# Patient Record
Sex: MALE | Race: WHITE | NOT HISPANIC OR LATINO | Employment: OTHER | URBAN - METROPOLITAN AREA
[De-identification: names, ages, dates, MRNs, and addresses within clinical notes are randomized per-mention and may not be internally consistent; named-entity substitution may affect disease eponyms.]

---

## 2017-01-03 ENCOUNTER — CLINICAL SUPPORT (OUTPATIENT)
Dept: AUDIOLOGY | Facility: CLINIC | Age: 82
End: 2017-01-03

## 2017-01-03 DIAGNOSIS — H90.3 SENSORINEURAL HEARING LOSS, BILATERAL: Primary | ICD-10-CM

## 2017-01-03 PROCEDURE — 99499 UNLISTED E&M SERVICE: CPT | Mod: S$GLB,,, | Performed by: OTOLARYNGOLOGY

## 2017-01-03 NOTE — PROGRESS NOTES
Patient's options were discussed.  He wants to review the prices with other places and will call to schedule further appointments if he decides to purchase hearing aids here.

## 2017-01-17 RX ORDER — KETOCONAZOLE 20 MG/ML
SHAMPOO, SUSPENSION TOPICAL
Qty: 120 ML | Refills: 3 | Status: SHIPPED | OUTPATIENT
Start: 2017-01-19 | End: 2018-07-19 | Stop reason: SDDI

## 2017-01-17 NOTE — TELEPHONE ENCOUNTER
----- Message from Keyla Ho sent at 1/17/2017 10:57 AM CST -----  Contact: Patient  Refill    ketoconazole (NIZORAL) 2 % shampoo   Sig - Route: Apply topically twice a week. - Topical (Top)    90 day supply     [facility] - [facility] [address] [facility] [address] [phone] (Phone) [phone] (Fax)    You can reach the patient at [phone].    Thanks!

## 2017-03-13 ENCOUNTER — PATIENT MESSAGE (OUTPATIENT)
Dept: INTERNAL MEDICINE | Facility: CLINIC | Age: 82
End: 2017-03-13

## 2017-03-14 RX ORDER — TADALAFIL 10 MG/1
10 TABLET ORAL DAILY PRN
Qty: 10 TABLET | Refills: 11 | Status: SHIPPED | OUTPATIENT
Start: 2017-03-14 | End: 2021-06-01

## 2017-03-30 ENCOUNTER — PATIENT MESSAGE (OUTPATIENT)
Dept: CARDIOLOGY | Facility: CLINIC | Age: 82
End: 2017-03-30

## 2017-07-21 RX ORDER — FINASTERIDE 5 MG/1
TABLET, FILM COATED ORAL
Qty: 90 TABLET | Refills: 3 | Status: SHIPPED | OUTPATIENT
Start: 2017-07-21 | End: 2018-07-19 | Stop reason: SDDI

## 2018-07-19 ENCOUNTER — OFFICE VISIT (OUTPATIENT)
Dept: CARDIOLOGY | Facility: CLINIC | Age: 83
End: 2018-07-19
Payer: MEDICARE

## 2018-07-19 VITALS
BODY MASS INDEX: 25.41 KG/M2 | SYSTOLIC BLOOD PRESSURE: 177 MMHG | HEIGHT: 70 IN | WEIGHT: 177.5 LBS | HEART RATE: 68 BPM | DIASTOLIC BLOOD PRESSURE: 83 MMHG

## 2018-07-19 DIAGNOSIS — I45.4 IVCD (INTRAVENTRICULAR CONDUCTION DEFECT): ICD-10-CM

## 2018-07-19 DIAGNOSIS — I38 VALVULAR HEART DISEASE: ICD-10-CM

## 2018-07-19 DIAGNOSIS — Z95.2 S/P AORTIC VALVE REPLACEMENT: ICD-10-CM

## 2018-07-19 DIAGNOSIS — T82.897D AORTIC PROSTHETIC VALVE REGURGITATION, SUBSEQUENT ENCOUNTER: ICD-10-CM

## 2018-07-19 DIAGNOSIS — I44.0 1ST DEGREE AV BLOCK: ICD-10-CM

## 2018-07-19 DIAGNOSIS — I25.810 CORONARY ARTERY DISEASE INVOLVING CORONARY BYPASS GRAFT OF NATIVE HEART WITHOUT ANGINA PECTORIS: Primary | ICD-10-CM

## 2018-07-19 DIAGNOSIS — R42 EPISODIC LIGHTHEADEDNESS: ICD-10-CM

## 2018-07-19 PROBLEM — T82.897A AORTIC PROSTHETIC VALVE REGURGITATION: Status: ACTIVE | Noted: 2018-07-19

## 2018-07-19 PROCEDURE — 99214 OFFICE O/P EST MOD 30 MIN: CPT | Mod: S$GLB,,, | Performed by: INTERNAL MEDICINE

## 2018-07-19 PROCEDURE — 99999 PR PBB SHADOW E&M-EST. PATIENT-LVL III: CPT | Mod: PBBFAC,,, | Performed by: INTERNAL MEDICINE

## 2018-07-19 RX ORDER — LOSARTAN POTASSIUM AND HYDROCHLOROTHIAZIDE 12.5; 5 MG/1; MG/1
1 TABLET ORAL DAILY
COMMUNITY
Start: 2018-07-17 | End: 2019-07-01 | Stop reason: SDUPTHER

## 2018-07-19 RX ORDER — ATORVASTATIN CALCIUM 40 MG/1
40 TABLET, FILM COATED ORAL DAILY
Qty: 90 TABLET | Refills: 3 | Status: SHIPPED | OUTPATIENT
Start: 2018-07-19 | End: 2019-07-01 | Stop reason: ALTCHOICE

## 2018-07-19 NOTE — PROGRESS NOTES
Subjective:   Patient ID:  Chang Braswell is a 83 y.o. male who presents for evaluation of Pre Syncope and Coronary artery disease involving coronary bypass graft with      HPI: Very pleasant man previously seen by Dr. Lewis with Dr. Jolene Lantigua in 2016 who presents with episodes of what he describes as dizziness (not room-spinning but with a feeling of unsteadiness and lightheadness).  He has had two     He had a CABG and AVR (bioprosthesis) in 2007 and on his 2016 echo there was evidence of mild to moderate AI.  It was not stated whether the insufficiency was central or paravalvular.    Past Medical History:   Diagnosis Date    Coronary artery disease     Heart murmur     Hyperlipidemia     Hypertension     Valvular regurgitation        Past Surgical History:   Procedure Laterality Date    CARDIAC VALVE SURGERY  2007    CORONARY ARTERY BYPASS GRAFT  2007    x 2       Social History   Substance Use Topics    Smoking status: Former Smoker     Quit date: 1/1/1976    Smokeless tobacco: Never Used    Alcohol use 8.4 oz/week     7 Glasses of wine, 7 Cans of beer per week      Comment: socially       Family History   Problem Relation Age of Onset    Heart attack Neg Hx     Heart disease Neg Hx     Hyperlipidemia Neg Hx     Hypertension Neg Hx        Current Outpatient Prescriptions   Medication Sig    co-enzyme Q-10 30 mg capsule Take 3 capsules (90 mg total) by mouth 2 (two) times daily.    losartan-hydrochlorothiazide 50-12.5 mg (HYZAAR) 50-12.5 mg per tablet Take 1 tablet by mouth once daily.     tadalafil (CIALIS) 10 MG tablet Take 1 tablet (10 mg total) by mouth daily as needed for Erectile Dysfunction.     No current facility-administered medications for this visit.        Review of patient's allergies indicates:  No Known Allergies    Review of Systems   Constitution: Negative.   HENT: Negative.    Eyes: Negative.    Cardiovascular: Negative.  Negative for chest pain, dyspnea on exertion,  near-syncope, orthopnea and palpitations.   Respiratory: Negative.  Negative for cough, hemoptysis and shortness of breath.    Endocrine: Negative.    Hematologic/Lymphatic: Negative.    Skin: Negative.    Musculoskeletal: Negative.    Gastrointestinal: Negative.    Genitourinary: Negative.    Neurological: Negative.    Psychiatric/Behavioral: Negative.      Objective:   Physical Exam   Constitutional: He is oriented to person, place, and time. He appears well-developed and well-nourished.   HENT:   Head: Normocephalic and atraumatic.   Mouth/Throat: Oropharynx is clear and moist.   Eyes: Conjunctivae and EOM are normal. No scleral icterus.   Neck: Normal range of motion. Neck supple. No JVD present.   Cardiovascular: Normal rate, regular rhythm, normal heart sounds and intact distal pulses.  Exam reveals no gallop and no friction rub.    No murmur heard.  Pulmonary/Chest: Effort normal and breath sounds normal. He has no wheezes. He has no rales.   Abdominal: Soft. Bowel sounds are normal. He exhibits no distension. There is no tenderness.   Musculoskeletal: Normal range of motion. He exhibits no edema.   Neurological: He is alert and oriented to person, place, and time.   Skin: Skin is warm and dry. No rash noted. No erythema.   Psychiatric: He has a normal mood and affect. His behavior is normal. Judgment and thought content normal.   Vitals reviewed.      Lab Results   Component Value Date    WBC 4.65 06/15/2016    HGB 14.9 06/15/2016    HCT 44.2 06/15/2016    MCV 78 (L) 06/15/2016     06/15/2016         Chemistry        Component Value Date/Time     (L) 06/15/2016 0935    K 4.9 06/15/2016 0935    CL 99 06/15/2016 0935    CO2 27 06/15/2016 0935    BUN 12 06/15/2016 0935    CREATININE 0.8 06/15/2016 0935    GLU 85 06/15/2016 0935        Component Value Date/Time    CALCIUM 9.5 06/15/2016 0935    ALKPHOS 74 06/15/2016 0935    AST 20 06/15/2016 0935    ALT 14 06/15/2016 0935    BILITOT 0.5 06/15/2016  0935    ESTGFRAFRICA >60.0 06/15/2016 0935    EGFRNONAA >60.0 06/15/2016 0935            Lab Results   Component Value Date    CHOL 223 (H) 06/15/2016     Lab Results   Component Value Date    HDL 61 06/15/2016     Lab Results   Component Value Date    LDLCALC 134.8 06/15/2016     Lab Results   Component Value Date    TRIG 136 06/15/2016     Lab Results   Component Value Date    CHOLHDL 27.4 06/15/2016       No results found for: TSH    No results found for: HGBA1C      Assessment:     1. 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 Valvular heart disease    3. S/P aortic valve replacement (bioprosthetic)    4. Aortic prosthetic valve regurgitation, subsequent encounter        Plan:     30 day event monitor     Echocardiogram    Watch BPs at home and double Hyzaar to 100/25 if it persists in elevation    He's taking ASA every day but not a statin.  Start atorvastatin 40.    F/U 3 months

## 2018-07-19 NOTE — LETTER
July 19, 2018      Gabino Manley MD  [address]  [facility] [address]           [facility]  [address]  [facility] [address]  Phone: [phone]          Patient: Chang Braswell   MR Number: [id number]   YOB: 1935   Date of Visit: 7/19/2018       Dear Dr. Gabino Manley:    Thank you for referring Chang Braswell to me for evaluation. Attached you will find relevant portions of my assessment and plan of care.    If you have questions, please do not hesitate to call me. I look forward to following Chang Braswell along with you.    Sincerely,    Bernardo Tobin MD    Enclosure  CC:  No Recipients    If you would like to receive this communication electronically, please contact [email or web address] or [phone] to request more information on [employer] Link access.    For providers and/or their staff who would like to refer a patient to [facility], please contact us through our one-stop-shop provider referral line, [facility], at [phone].    If you feel you have received this communication in error or would no longer like to receive these types of communications, please e-mail [email or web address]

## 2018-07-24 ENCOUNTER — HOSPITAL ENCOUNTER (OUTPATIENT)
Dept: CARDIOLOGY | Facility: CLINIC | Age: 83
Discharge: HOME | End: 2018-07-24
Attending: INTERNAL MEDICINE
Payer: MEDICARE

## 2018-07-24 ENCOUNTER — CLINICAL SUPPORT (OUTPATIENT)
Dept: ELECTROPHYSIOLOGY | Facility: CLINIC | Age: 83
End: 2018-07-24
Attending: INTERNAL MEDICINE
Payer: MEDICARE

## 2018-07-24 DIAGNOSIS — T82.897D AORTIC PROSTHETIC VALVE REGURGITATION, SUBSEQUENT ENCOUNTER: ICD-10-CM

## 2018-07-24 DIAGNOSIS — I25.810 CORONARY ARTERY DISEASE INVOLVING CORONARY BYPASS GRAFT OF NATIVE HEART WITHOUT ANGINA PECTORIS: ICD-10-CM

## 2018-07-24 DIAGNOSIS — R42 EPISODIC LIGHTHEADEDNESS: ICD-10-CM

## 2018-07-24 DIAGNOSIS — I44.0 1ST DEGREE AV BLOCK: ICD-10-CM

## 2018-07-24 DIAGNOSIS — Z95.2 S/P AORTIC VALVE REPLACEMENT: ICD-10-CM

## 2018-07-24 DIAGNOSIS — I45.4 IVCD (INTRAVENTRICULAR CONDUCTION DEFECT): ICD-10-CM

## 2018-07-24 LAB
AORTIC VALVE REGURGITATION: ABNORMAL
ESTIMATED PA SYSTOLIC PRESSURE: 24.53
MITRAL VALVE MOBILITY: NORMAL
MITRAL VALVE REGURGITATION: ABNORMAL
RETIRED EF AND QEF - SEE NOTES: 50 (ref 55–65)
TRICUSPID VALVE REGURGITATION: ABNORMAL

## 2018-07-24 PROCEDURE — 93306 TTE W/DOPPLER COMPLETE: CPT | Mod: S$GLB,,, | Performed by: INTERNAL MEDICINE

## 2018-07-24 PROCEDURE — 93268 ECG RECORD/REVIEW: CPT | Mod: S$GLB,,, | Performed by: INTERNAL MEDICINE

## 2018-09-10 ENCOUNTER — TELEPHONE (OUTPATIENT)
Dept: NEUROLOGY | Facility: CLINIC | Age: 83
End: 2018-09-10

## 2018-09-10 NOTE — TELEPHONE ENCOUNTER
Spoke to wife and appointment has been scheduled for 10-22-18 at 0840 am as a referral from Dr.Robert Manley for memory.

## 2018-09-10 NOTE — TELEPHONE ENCOUNTER
----- Message from Sharron Martinez sent at 9/10/2018 10:43 AM CDT -----  Contact: Chang   Name of Who is Calling: Chang       What is the request in detail: Patient is requesting a call back concerning scheduling a appointment  he wants someone to call his wife Dr.Rebecca King     Can the clinic reply by [facility]: no      What Number to Call Back [facility] not [facility]: [phone]

## 2018-10-22 ENCOUNTER — OFFICE VISIT (OUTPATIENT)
Dept: NEUROLOGY | Facility: CLINIC | Age: 83
End: 2018-10-22
Payer: MEDICARE

## 2018-10-22 ENCOUNTER — LAB VISIT (OUTPATIENT)
Dept: LAB | Facility: OTHER | Age: 83
End: 2018-10-22
Attending: PSYCHIATRY & NEUROLOGY
Payer: MEDICARE

## 2018-10-22 VITALS
HEART RATE: 57 BPM | WEIGHT: 182.56 LBS | HEIGHT: 70 IN | SYSTOLIC BLOOD PRESSURE: 183 MMHG | DIASTOLIC BLOOD PRESSURE: 65 MMHG | BODY MASS INDEX: 26.14 KG/M2

## 2018-10-22 DIAGNOSIS — I10 ESSENTIAL HYPERTENSION: ICD-10-CM

## 2018-10-22 DIAGNOSIS — Z95.2 S/P AORTIC VALVE REPLACEMENT: ICD-10-CM

## 2018-10-22 DIAGNOSIS — R41.9 UNSPECIFIED SYMPTOMS AND SIGNS INVOLVING COGNITIVE FUNCTIONS AND AWARENESS: Primary | ICD-10-CM

## 2018-10-22 DIAGNOSIS — H91.90 DECREASED HEARING, UNSPECIFIED LATERALITY: ICD-10-CM

## 2018-10-22 DIAGNOSIS — I25.810 CORONARY ARTERY DISEASE INVOLVING CORONARY BYPASS GRAFT OF NATIVE HEART WITHOUT ANGINA PECTORIS: ICD-10-CM

## 2018-10-22 DIAGNOSIS — R41.9 UNSPECIFIED SYMPTOMS AND SIGNS INVOLVING COGNITIVE FUNCTIONS AND AWARENESS: ICD-10-CM

## 2018-10-22 PROBLEM — R41.89 OTHER SYMPTOMS AND SIGNS INVOLVING COGNITIVE FUNCTIONS AND AWARENESS: Status: ACTIVE | Noted: 2018-10-22

## 2018-10-22 LAB
FOLATE SERPL-MCNC: 12.6 NG/ML
T3 SERPL-MCNC: 98 NG/DL
TSH SERPL DL<=0.005 MIU/L-ACNC: 1.41 UIU/ML
VIT B12 SERPL-MCNC: 461 PG/ML

## 2018-10-22 PROCEDURE — 99214 OFFICE O/P EST MOD 30 MIN: CPT | Mod: PBBFAC | Performed by: PSYCHIATRY & NEUROLOGY

## 2018-10-22 PROCEDURE — 36415 COLL VENOUS BLD VENIPUNCTURE: CPT

## 2018-10-22 PROCEDURE — 82746 ASSAY OF FOLIC ACID SERUM: CPT

## 2018-10-22 PROCEDURE — 84443 ASSAY THYROID STIM HORMONE: CPT

## 2018-10-22 PROCEDURE — 84480 ASSAY TRIIODOTHYRONINE (T3): CPT

## 2018-10-22 PROCEDURE — 3288F FALL RISK ASSESSMENT DOCD: CPT | Mod: CPTII,,, | Performed by: PSYCHIATRY & NEUROLOGY

## 2018-10-22 PROCEDURE — 82607 VITAMIN B-12: CPT

## 2018-10-22 PROCEDURE — 99204 OFFICE O/P NEW MOD 45 MIN: CPT | Mod: S$PBB,,, | Performed by: PSYCHIATRY & NEUROLOGY

## 2018-10-22 PROCEDURE — 1100F PTFALLS ASSESS-DOCD GE2>/YR: CPT | Mod: CPTII,,, | Performed by: PSYCHIATRY & NEUROLOGY

## 2018-10-22 PROCEDURE — 99999 PR PBB SHADOW E&M-EST. PATIENT-LVL IV: CPT | Mod: PBBFAC,,, | Performed by: PSYCHIATRY & NEUROLOGY

## 2018-10-22 NOTE — LETTER
October 22, 2018      Gabino Manley MD  [address] [facility] [address]  [place] [id number]           [facility]  [address]  [facility] [address]  Phone: [phone]  Fax: [phone]          Patient: Chang Braswell   MR Number: [id number]   YOB: 1935   Date of Visit: 10/22/2018       Dear Dr. Gabino Manley:    Thank you for referring Chang Braswell to me for evaluation. Attached you will find relevant portions of my assessment and plan of care.    If you have questions, please do not hesitate to call me. I look forward to following Chang Braswell along with you.    Sincerely,    Zev Estrada MD    Enclosure  CC:  No Recipients    If you would like to receive this communication electronically, please contact [email or web address] or [phone] to request more information on [employer] Link access.    For providers and/or their staff who would like to refer a patient to [facility], please contact us through our one-stop-shop provider referral line, [facility], at [phone].    If you feel you have received this communication in error or would no longer like to receive these types of communications, please e-mail [email or web address]

## 2018-10-22 NOTE — PATIENT INSTRUCTIONS
Discussed with patient and spouse.  Will get a CT scan the brain, noncontrast, B12/folate/T3/TSH and schedule neuropsychological testing. Patient will follow-up after neuropsychological testing is completed.

## 2018-10-22 NOTE — PROGRESS NOTES
Subjective:       Patient ID: Chang Braswell is a 83 y.o. male.    Chief Complaint:  Memory Loss      History of Present Illness  HPI   This is an 83-year-old male was referred for evaluation intermittent memory difficulties.  His spouse, who is a family practice physician, was the primary historian as patient tended to minimize his problems.  She reports that over the year or two she has noted that he has slowed down cognitively in that he is more cautious when driving and tends to drive slowly.  In addition, he has been using getting confused using the [employer] wipers or the turn signals.  She does note that he is hearing impaired and will be evaluated for this in the near future.  Other problems include excessive snoring and she does note that he occasionally has brief apneic episodes and she has to try to wake him.  He does have daytime drowsiness.  The patient is otherwise able to take care of his day-to-day needs at home including managing his medications and finances but she does report that in the past he has stopped taking his blood pressure medications and then had near syncopal like episodes and had to restart his medications.  He has no history of blackouts or seizures and no history of any significant head trauma.  Medical records including labs from his primary care physician, Dr. Manley, are available for review.       Review of Systems  Review of Systems   Constitutional: Negative.    HENT: Positive for hearing loss.    Eyes: Negative.  Negative for visual disturbance.   Respiratory: Negative.  Negative for shortness of breath.    Cardiovascular: Negative.  Negative for chest pain and palpitations.   Gastrointestinal: Negative.    Endocrine: Negative.    Genitourinary: Negative.    Musculoskeletal: Negative.  Negative for back pain and gait problem.   Skin: Negative.    Allergic/Immunologic: Negative.    Neurological: Negative.  Negative for dizziness, tremors, seizures, syncope, speech  difficulty, weakness, numbness and headaches.   Hematological: Negative.    Psychiatric/Behavioral: Positive for decreased concentration and sleep disturbance (Excessive snoring).       Objective:      Neurologic Exam     Mental Status   Oriented to person, place, and time.   Registration: recalls 3 of 3 objects. Follows 3 step commands.   Attention: normal. Concentration: normal.   Speech: speech is normal   Level of consciousness: alert  Knowledge: good.   Able to name object. Able to read. Able to repeat. Able to write. Normal comprehension.   Patient is alert, verbal incoherent, and no distress. Language functions are normal. Attention span and concentration is normal.  Judgment and insight is normal. Affect is appropriate. Mood is even.     Cranial Nerves   Cranial nerves II through XII intact.     CN II   Visual fields full to confrontation.     CN III, IV, VI   Pupils are equal, round, and reactive to light.  Extraocular motions are normal.   Right pupil: Size: 3 mm. Shape: regular. Reactivity: brisk. Consensual response: intact. Accommodation: intact.   Left pupil: Size: 3 mm. Shape: regular. Reactivity: brisk. Consensual response: intact. Accommodation: intact.   CN III: no CN III palsy  CN VI: no CN VI palsy  Nystagmus: none   Diplopia: none  Ophthalmoparesis: none    CN V   Facial sensation intact.     CN VII   Facial expression full, symmetric.     CN VIII   CN VIII normal.     CN IX, X   CN IX normal.     CN XI   CN XI normal.     CN XII   CN XII normal.   Fundus examination:  Sharp disc margins.  Normal vessels on nondilated exam.     Motor Exam   Muscle bulk: normal  Overall muscle tone: normal    Strength   Strength 5/5 throughout. Examination of extremities revealed normal tone and power in both upper and lower extremities with no focal weakness, involuntary movements or atrophy.     Sensory Exam   Light touch normal.   Proprioception normal.   Pinprick normal.     Gait, Coordination, and Reflexes      Gait  Gait: normal    Coordination   Romberg: negative  Finger to nose coordination: normal    Tremor   Resting tremor: absent  Intention tremor: absent  Action tremor: absent    Reflexes   Right brachioradialis: 1+  Left brachioradialis: 1+  Right biceps: 1+  Left biceps: 1+  Right triceps: 1+  Left triceps: 1+  Right patellar: 1+  Left patellar: 1+  Right achilles: 1+  Left achilles: 1+  Right plantar: normal  Left plantar: normal      Physical Exam   Constitutional: He is oriented to person, place, and time. He appears well-developed and well-nourished.   HENT:   Head: Normocephalic and atraumatic.   Eyes: EOM are normal. Pupils are equal, round, and reactive to light.   Fundus examination showed sharp discs margins   Neck: Normal range of motion. Neck supple. Carotid bruit is not present.   Cardiovascular: Normal rate, regular rhythm and normal heart sounds.   Aortic bruit projected to the carotids bilaterally   Neurological: He is oriented to person, place, and time. He has normal strength. He has a normal Finger-Nose-Finger Test and a normal Romberg Test. Gait normal.   Reflex Scores:       Tricep reflexes are 1+ on the right side and 1+ on the left side.       Bicep reflexes are 1+ on the right side and 1+ on the left side.       Brachioradialis reflexes are 1+ on the right side and 1+ on the left side.       Patellar reflexes are 1+ on the right side and 1+ on the left side.       Achilles reflexes are 1+ on the right side and 1+ on the left side.  Psychiatric: His speech is normal.   Vitals reviewed.        Assessment:        1. Unspecified symptoms and signs involving cognitive functions and awareness     2. Coronary artery disease involving coronary bypass graft of native heart without angina pectoris    3. Decreased hearing, unspecified laterality    4. S/P aortic valve replacement (bioprosthetic)            Plan:       Discussed with patient and spouse.  Will get a CT scan the brain, noncontrast,  B12/folate/T3/TSH and schedule neuropsychological testing. Patient will follow-up after neuropsychological testing is completed.  Advised spouse regarding his excessive snoring and the possibility of sleep apnea.  She is advised to discuss this with Dr. Manley, to consider doing sleep study in the future.  The patient will follow-up after neuropsychological testing results are available.

## 2018-10-25 ENCOUNTER — HOSPITAL ENCOUNTER (OUTPATIENT)
Dept: RADIOLOGY | Facility: HOSPITAL | Age: 83
Discharge: HOME | End: 2018-10-25
Attending: PSYCHIATRY & NEUROLOGY
Payer: MEDICARE

## 2018-10-25 DIAGNOSIS — H91.90 DECREASED HEARING, UNSPECIFIED LATERALITY: ICD-10-CM

## 2018-10-25 DIAGNOSIS — Z95.2 S/P AORTIC VALVE REPLACEMENT: ICD-10-CM

## 2018-10-25 DIAGNOSIS — R41.9 UNSPECIFIED SYMPTOMS AND SIGNS INVOLVING COGNITIVE FUNCTIONS AND AWARENESS: ICD-10-CM

## 2018-10-25 DIAGNOSIS — I10 ESSENTIAL HYPERTENSION: ICD-10-CM

## 2018-10-25 DIAGNOSIS — I25.810 CORONARY ARTERY DISEASE INVOLVING CORONARY BYPASS GRAFT OF NATIVE HEART WITHOUT ANGINA PECTORIS: ICD-10-CM

## 2018-10-25 PROCEDURE — 70450 CT HEAD/BRAIN W/O DYE: CPT | Mod: 26,,, | Performed by: RADIOLOGY

## 2018-10-25 PROCEDURE — 70450 CT HEAD/BRAIN W/O DYE: CPT | Mod: TC

## 2019-01-24 ENCOUNTER — TELEPHONE (OUTPATIENT)
Dept: NEUROLOGY | Facility: CLINIC | Age: 84
End: 2019-01-24

## 2019-01-24 NOTE — TELEPHONE ENCOUNTER
LM that I will contact him tomorrow to discuss having NP testing don at [facility] in [facility].

## 2019-01-24 NOTE — TELEPHONE ENCOUNTER
----- Message from Alisha Mendez sent at 1/24/2019  4:14 PM CST -----  Contact: pt   Name of Who is Calling: KARI JONES [[id number]]       What is the request in detail:  Patient is requesting a call he states he was to have a physcological exam and still hasn't heard from the doctor to schedulePlease contact to further discuss and advise.       Can the clinic reply by [facility]: no       What Number to Call Back [facility] not in [facility]: [phone]

## 2019-01-28 ENCOUNTER — TELEPHONE (OUTPATIENT)
Dept: NEUROLOGY | Facility: CLINIC | Age: 84
End: 2019-01-28

## 2019-02-06 ENCOUNTER — OFFICE VISIT (OUTPATIENT)
Dept: CARDIOLOGY | Facility: CLINIC | Age: 84
End: 2019-02-06
Payer: MEDICARE

## 2019-02-06 ENCOUNTER — TELEPHONE (OUTPATIENT)
Dept: NEUROLOGY | Facility: CLINIC | Age: 84
End: 2019-02-06

## 2019-02-06 VITALS
BODY MASS INDEX: 25.69 KG/M2 | DIASTOLIC BLOOD PRESSURE: 66 MMHG | WEIGHT: 179.44 LBS | HEIGHT: 70 IN | SYSTOLIC BLOOD PRESSURE: 154 MMHG | HEART RATE: 66 BPM

## 2019-02-06 DIAGNOSIS — I10 ESSENTIAL HYPERTENSION: ICD-10-CM

## 2019-02-06 DIAGNOSIS — I38 VALVULAR HEART DISEASE: ICD-10-CM

## 2019-02-06 DIAGNOSIS — Z95.2 S/P AORTIC VALVE REPLACEMENT: ICD-10-CM

## 2019-02-06 DIAGNOSIS — T82.897D AORTIC PROSTHETIC VALVE REGURGITATION, SUBSEQUENT ENCOUNTER: Primary | ICD-10-CM

## 2019-02-06 DIAGNOSIS — I25.810 CORONARY ARTERY DISEASE INVOLVING CORONARY BYPASS GRAFT OF NATIVE HEART WITHOUT ANGINA PECTORIS: ICD-10-CM

## 2019-02-06 PROCEDURE — 99999 PR PBB SHADOW E&M-EST. PATIENT-LVL III: CPT | Mod: PBBFAC,,, | Performed by: INTERNAL MEDICINE

## 2019-02-06 PROCEDURE — 1100F PR PT FALLS ASSESS DOC 2+ FALLS/FALL W/INJURY/YR: ICD-10-PCS | Mod: CPTII,S$GLB,, | Performed by: INTERNAL MEDICINE

## 2019-02-06 PROCEDURE — 3077F SYST BP >= 140 MM HG: CPT | Mod: CPTII,S$GLB,, | Performed by: INTERNAL MEDICINE

## 2019-02-06 PROCEDURE — 1100F PTFALLS ASSESS-DOCD GE2>/YR: CPT | Mod: CPTII,S$GLB,, | Performed by: INTERNAL MEDICINE

## 2019-02-06 PROCEDURE — 3078F DIAST BP <80 MM HG: CPT | Mod: CPTII,S$GLB,, | Performed by: INTERNAL MEDICINE

## 2019-02-06 PROCEDURE — 99214 PR OFFICE/OUTPT VISIT, EST, LEVL IV, 30-39 MIN: ICD-10-PCS | Mod: S$GLB,,, | Performed by: INTERNAL MEDICINE

## 2019-02-06 PROCEDURE — 99214 OFFICE O/P EST MOD 30 MIN: CPT | Mod: S$GLB,,, | Performed by: INTERNAL MEDICINE

## 2019-02-06 PROCEDURE — 3078F PR MOST RECENT DIASTOLIC BLOOD PRESSURE < 80 MM HG: ICD-10-PCS | Mod: CPTII,S$GLB,, | Performed by: INTERNAL MEDICINE

## 2019-02-06 PROCEDURE — 3288F PR FALLS RISK ASSESSMENT DOCUMENTED: ICD-10-PCS | Mod: CPTII,S$GLB,, | Performed by: INTERNAL MEDICINE

## 2019-02-06 PROCEDURE — 99999 PR PBB SHADOW E&M-EST. PATIENT-LVL III: ICD-10-PCS | Mod: PBBFAC,,, | Performed by: INTERNAL MEDICINE

## 2019-02-06 PROCEDURE — 3077F PR MOST RECENT SYSTOLIC BLOOD PRESSURE >= 140 MM HG: ICD-10-PCS | Mod: CPTII,S$GLB,, | Performed by: INTERNAL MEDICINE

## 2019-02-06 PROCEDURE — 3288F FALL RISK ASSESSMENT DOCD: CPT | Mod: CPTII,S$GLB,, | Performed by: INTERNAL MEDICINE

## 2019-02-06 NOTE — TELEPHONE ENCOUNTER
----- Message from Suze Fortune sent at 2/6/2019  2:47 PM CST -----  Contact: Caroline pt's Spouse   Name of Who is Calling: Caroline pt's Spouse     What is the request in detail: Caroline pt's Spouse is checking the status of her Neuro Phycological referral. States the pt hasn't received any information. Please call to further discuss and advise.     Can the clinic reply by [facility]: No     What Number to Call Back if not in [facility]: Caroline [phone]

## 2019-02-06 NOTE — TELEPHONE ENCOUNTER
Spoke to wife who has agreed to have NP testing done at [facility]. All necessary information has been faxed to there office for scheduling.

## 2019-02-06 NOTE — PROGRESS NOTES
Subjective:   Patient ID:  Chang Braswell is a 83 y.o. male who presents for follow up of Coronary artery disease involving coronary bypass graft of n and Leg Problem (bilateral leg pain x 2-3 months)      HPI: Routine 6 month f/u of CABG, AVR, and central leak.  He is doing well with no new symptoms or cardiovascular complaints and no change in exercise capacity.  He denies chest discomfort, LAUREN, palpitations, PND/orthopnea, lightheadedness and syncope.    He has occasional calf tightness - at least in the past - but it hasn't been happening as much regularly.  His wife, a retired family medicine doctor, is worried that he's dialing back his activity.    July 2018 HPI: Very pleasant man previously seen by Dr. Lewis with Dr. Jolene Lantigua in 2016 who presents with episodes of what he describes as dizziness (not room-spinning but with a feeling of unsteadiness and lightheadness).  He has had two      He had a CABG and AVR (bioprosthesis) in 2007 and on his 2016 echo there was evidence of mild to moderate AI.  It was not stated whether the insufficiency was central or paravalvular    Echo:  CONCLUSIONS     1 - Low normal left ventricular systolic function (EF 50-55%).     2 - No wall motion abnormalities.     3 - Biatrial enlargement.     4 - Indeterminate LV diastolic function.     5 - Right ventricular enlargement with low normal to mildly depressed systolic function.     6 - The estimated PA systolic pressure is 25 mmHg.     7 - Aortic valve prosthesis, LIZETTE = 1.07 cm2, AVAi = 0.54 cm2/m2, peak velocity = 3.18 m/s, mean gradient = 24 mmHg.     8 - Mild to moderate valvular aortic regurgitation.     9 - Trivial mitral regurgitation.     10 - Trivial tricuspid regurgitation.     11 - Consider degenerating aortic bioiprosthesis.  Clinical correlation.     Patient Active Problem List   Diagnosis    History of benign prostatic hypertrophy    Male erectile disorder    Hematuria    Valvular heart disease     Coronary artery disease involving coronary bypass graft    Decreased hearing    S/P aortic valve replacement (bioprosthetic)    Aortic prosthetic valve regurgitation    Unspecified symptoms and signs involving cognitive functions and awareness    Essential hypertension       Current Outpatient Medications   Medication Sig    atorvastatin (LIPITOR) 40 MG tablet Take 1 tablet (40 mg total) by mouth once daily.    co-enzyme Q-10 30 mg capsule Take 3 capsules (90 mg total) by mouth 2 (two) times daily.    losartan-hydrochlorothiazide 50-12.5 mg (HYZAAR) 50-12.5 mg per tablet Take 1 tablet by mouth once daily.     tadalafil (CIALIS) 10 MG tablet Take 1 tablet (10 mg total) by mouth daily as needed for Erectile Dysfunction.     No current facility-administered medications for this visit.        Review of Systems   Constitution: Negative.   HENT: Negative.    Eyes: Negative.    Cardiovascular: Negative.  Negative for chest pain, dyspnea on exertion, near-syncope, orthopnea and palpitations.   Respiratory: Negative.  Negative for cough, hemoptysis and shortness of breath.    Endocrine: Negative.    Hematologic/Lymphatic: Negative.    Skin: Negative.    Musculoskeletal: Negative.    Gastrointestinal: Negative.    Genitourinary: Negative.    Neurological: Negative.    Psychiatric/Behavioral: Negative.      Objective:   Physical Exam   Constitutional: He is oriented to person, place, and time. He appears well-developed and well-nourished.   HENT:   Head: Normocephalic and atraumatic.   Mouth/Throat: Oropharynx is clear and moist.   Eyes: Conjunctivae and EOM are normal. No scleral icterus.   Neck: Normal range of motion. Neck supple. No JVD present.   Cardiovascular: Normal rate, regular rhythm and normal heart sounds. Exam reveals no gallop and no friction rub.   No murmur heard.  Pulses:       Dorsalis pedis pulses are 2+ on the right side, and 2+ on the left side.        Posterior tibial pulses are 0 on the right  side, and 1+ on the left side.   Pulmonary/Chest: Effort normal and breath sounds normal. He has no wheezes. He has no rales.   Abdominal: Soft. Bowel sounds are normal. He exhibits no distension. There is no tenderness.   Musculoskeletal: Normal range of motion. He exhibits no edema.   Neurological: He is alert and oriented to person, place, and time.   Skin: Skin is warm and dry. No rash noted. No erythema.   Psychiatric: He has a normal mood and affect. His behavior is normal. Judgment and thought content normal.   Vitals reviewed.      Lab Results   Component Value Date    WBC 4.65 06/15/2016    HGB 14.9 06/15/2016    HCT 44.2 06/15/2016    MCV 78 (L) 06/15/2016     06/15/2016         Chemistry        Component Value Date/Time     (L) 06/15/2016 0935    K 4.9 06/15/2016 0935    CL 99 06/15/2016 0935    CO2 27 06/15/2016 0935    BUN 12 06/15/2016 0935    CREATININE 0.8 06/15/2016 0935    GLU 85 06/15/2016 0935        Component Value Date/Time    CALCIUM 9.5 06/15/2016 0935    ALKPHOS 74 06/15/2016 0935    AST 20 06/15/2016 0935    ALT 14 06/15/2016 0935    BILITOT 0.5 06/15/2016 0935    ESTGFRAFRICA >60.0 06/15/2016 0935    EGFRNONAA >60.0 06/15/2016 0935            Lab Results   Component Value Date    CHOL 223 (H) 06/15/2016     Lab Results   Component Value Date    HDL 61 06/15/2016     Lab Results   Component Value Date    LDLCALC 134.8 06/15/2016     Lab Results   Component Value Date    TRIG 136 06/15/2016     Lab Results   Component Value Date    CHOLHDL 27.4 06/15/2016       Lab Results   Component Value Date    TSH 1.405 10/22/2018       No results found for: HGBA1C    Assessment:     1. Aortic prosthetic valve regurgitation, subsequent encounter    2. Coronary artery disease involving coronary bypass graft of native heart without angina pectoris    3. Essential hypertension    4. Valvular heart disease    5. S/P aortic valve replacement (bioprosthetic)        Plan:     Continue current  medicines.  Walk, walk, walk.    Diet/exercise goals reinforced.    F/U 6 months

## 2019-02-12 ENCOUNTER — TELEPHONE (OUTPATIENT)
Dept: NEUROLOGY | Facility: CLINIC | Age: 84
End: 2019-02-12

## 2019-02-12 NOTE — TELEPHONE ENCOUNTER
Received confirmation that NP testing will be done with [facility] on 3-7-2019.

## 2019-05-22 ENCOUNTER — PATIENT MESSAGE (OUTPATIENT)
Dept: NEUROLOGY | Facility: CLINIC | Age: 84
End: 2019-05-22

## 2019-05-23 ENCOUNTER — OFFICE VISIT (OUTPATIENT)
Dept: NEUROLOGY | Facility: CLINIC | Age: 84
End: 2019-05-23
Payer: MEDICARE

## 2019-05-23 VITALS
SYSTOLIC BLOOD PRESSURE: 129 MMHG | BODY MASS INDEX: 25.85 KG/M2 | WEIGHT: 180.56 LBS | HEIGHT: 70 IN | DIASTOLIC BLOOD PRESSURE: 63 MMHG | HEART RATE: 72 BPM

## 2019-05-23 DIAGNOSIS — G31.84 MCI (MILD COGNITIVE IMPAIRMENT): Primary | ICD-10-CM

## 2019-05-23 DIAGNOSIS — H91.90 DECREASED HEARING, UNSPECIFIED LATERALITY: ICD-10-CM

## 2019-05-23 DIAGNOSIS — I25.810 CORONARY ARTERY DISEASE INVOLVING CORONARY BYPASS GRAFT OF NATIVE HEART WITHOUT ANGINA PECTORIS: ICD-10-CM

## 2019-05-23 DIAGNOSIS — I10 ESSENTIAL HYPERTENSION: ICD-10-CM

## 2019-05-23 DIAGNOSIS — Z95.2 S/P AORTIC VALVE REPLACEMENT: ICD-10-CM

## 2019-05-23 DIAGNOSIS — I38 VALVULAR HEART DISEASE: ICD-10-CM

## 2019-05-23 PROCEDURE — 3078F PR MOST RECENT DIASTOLIC BLOOD PRESSURE < 80 MM HG: ICD-10-PCS | Mod: CPTII,S$GLB,, | Performed by: PSYCHIATRY & NEUROLOGY

## 2019-05-23 PROCEDURE — 99999 PR PBB SHADOW E&M-EST. PATIENT-LVL III: CPT | Mod: PBBFAC,,, | Performed by: PSYCHIATRY & NEUROLOGY

## 2019-05-23 PROCEDURE — 1100F PTFALLS ASSESS-DOCD GE2>/YR: CPT | Mod: CPTII,S$GLB,, | Performed by: PSYCHIATRY & NEUROLOGY

## 2019-05-23 PROCEDURE — 99999 PR PBB SHADOW E&M-EST. PATIENT-LVL III: ICD-10-PCS | Mod: PBBFAC,,, | Performed by: PSYCHIATRY & NEUROLOGY

## 2019-05-23 PROCEDURE — 3074F PR MOST RECENT SYSTOLIC BLOOD PRESSURE < 130 MM HG: ICD-10-PCS | Mod: CPTII,S$GLB,, | Performed by: PSYCHIATRY & NEUROLOGY

## 2019-05-23 PROCEDURE — 3074F SYST BP LT 130 MM HG: CPT | Mod: CPTII,S$GLB,, | Performed by: PSYCHIATRY & NEUROLOGY

## 2019-05-23 PROCEDURE — 3288F FALL RISK ASSESSMENT DOCD: CPT | Mod: CPTII,S$GLB,, | Performed by: PSYCHIATRY & NEUROLOGY

## 2019-05-23 PROCEDURE — 99214 OFFICE O/P EST MOD 30 MIN: CPT | Mod: S$GLB,,, | Performed by: PSYCHIATRY & NEUROLOGY

## 2019-05-23 PROCEDURE — 1100F PR PT FALLS ASSESS DOC 2+ FALLS/FALL W/INJURY/YR: ICD-10-PCS | Mod: CPTII,S$GLB,, | Performed by: PSYCHIATRY & NEUROLOGY

## 2019-05-23 PROCEDURE — 3288F PR FALLS RISK ASSESSMENT DOCUMENTED: ICD-10-PCS | Mod: CPTII,S$GLB,, | Performed by: PSYCHIATRY & NEUROLOGY

## 2019-05-23 PROCEDURE — 99214 PR OFFICE/OUTPT VISIT, EST, LEVL IV, 30-39 MIN: ICD-10-PCS | Mod: S$GLB,,, | Performed by: PSYCHIATRY & NEUROLOGY

## 2019-05-23 PROCEDURE — 3078F DIAST BP <80 MM HG: CPT | Mod: CPTII,S$GLB,, | Performed by: PSYCHIATRY & NEUROLOGY

## 2019-05-23 RX ORDER — MEMANTINE HYDROCHLORIDE 5 MG/1
5 TABLET ORAL EVERY MORNING
Qty: 90 TABLET | Refills: 3 | Status: SHIPPED | OUTPATIENT
Start: 2019-05-23 | End: 2020-09-28

## 2019-05-23 RX ORDER — OMEPRAZOLE 40 MG/1
CAPSULE, DELAYED RELEASE ORAL
COMMUNITY
Start: 2019-05-01 | End: 2020-09-28

## 2019-05-23 NOTE — PROGRESS NOTES
Subjective:       Patient ID: Chang Braswell is a 84 y.o. male.    Chief Complaint:  Memory Loss      History of Present Illness  HPI  This is an 84-year-old male who had been seen by me for intermittent memory difficulties.  His spouse, who is a family practice physician, was the primary historian as patient tended to minimize his problems.  She reports that over the year or two she has noted that he has slowed down cognitively in that he is more cautious when driving and tends to drive slowly.  In addition, he has been using getting confused using the [employer] wipers or the turn signals.  She does note that he is hearing impaired and had been evaluated for this and now hearing aids.  Other problems include excessive snoring and she does note that he occasionally has brief apneic episodes and she has to try to wake him.  He does have daytime drowsiness.  The patient is otherwise able to take care of his day-to-day needs at home including managing his medications and finances but she does report that in the past he has stopped taking his blood pressure medications and then had near syncopal like episodes and had to restart his medications.  He has no history of blackouts or seizures and no history of any significant head trauma.  Following his last visit he had a CT scan of the brain that did not show any significant abnormality and blood work done was essentially normal. He subsequently had neuropsychological testing done was consistent with mild most likely on a vascular basis given his multiple vascular risk.  Results were discussed with the patient and spouse..  She does report that he had the spell over the weekend when he felt dizzy and lost balance falling to the ground however was able to get up on his own.  His blood pressure was good however subsequently went much higher and he seen by primary care in the next couple of days to a twice observation and monitoring his blood pressures.  He had no  chest pains or palpitations and did not lose consciousness.       Review of Systems  Review of Systems   Constitutional: Negative.    HENT: Positive for hearing loss.    Eyes: Negative.  Negative for visual disturbance.   Respiratory: Negative.  Negative for shortness of breath.    Cardiovascular: Negative.  Negative for chest pain and palpitations.   Gastrointestinal: Negative.    Endocrine: Negative.    Genitourinary: Negative.    Musculoskeletal: Negative.  Negative for back pain and gait problem.   Skin: Negative.    Allergic/Immunologic: Negative.    Neurological: Negative.  Negative for dizziness, tremors, seizures, syncope, speech difficulty, weakness, numbness and headaches.   Hematological: Negative.    Psychiatric/Behavioral: Positive for decreased concentration and sleep disturbance (Excessive snoring).       Objective:      Neurologic Exam     Mental Status   Oriented to person, place, and time.   Registration: recalls 3 of 3 objects. Follows 3 step commands.   Attention: normal. Concentration: normal.   Speech: speech is normal   Level of consciousness: alert  Knowledge: good.   Able to name object. Able to read. Able to repeat. Able to write. Normal comprehension.   Patient is alert, verbal incoherent, and no distress. Language functions are normal. Attention span and concentration is normal.  Judgment and insight is normal. Affect is appropriate. Mood is even.     Cranial Nerves   Cranial nerves II through XII intact.     CN II   Visual fields full to confrontation.     CN III, IV, VI   Pupils are equal, round, and reactive to light.  Extraocular motions are normal.   Right pupil: Size: 3 mm. Shape: regular. Reactivity: brisk. Consensual response: intact. Accommodation: intact.   Left pupil: Size: 3 mm. Shape: regular. Reactivity: brisk. Consensual response: intact. Accommodation: intact.   CN III: no CN III palsy  CN VI: no CN VI palsy  Nystagmus: none   Diplopia: none  Ophthalmoparesis: none    CN V    Facial sensation intact.     CN VII   Facial expression full, symmetric.     CN VIII   CN VIII normal.     CN IX, X   CN IX normal.     CN XI   CN XI normal.     CN XII   CN XII normal.   Fundus examination:  Sharp disc margins.  Normal vessels on nondilated exam.     Motor Exam   Muscle bulk: normal  Overall muscle tone: normal    Strength   Strength 5/5 throughout. Examination of extremities revealed normal tone and power in both upper and lower extremities with no focal weakness, involuntary movements or atrophy.     Sensory Exam   Light touch normal.   Proprioception normal.   Pinprick normal.     Gait, Coordination, and Reflexes     Gait  Gait: normal    Coordination   Romberg: negative  Finger to nose coordination: normal    Tremor   Resting tremor: absent  Intention tremor: absent  Action tremor: absent    Reflexes   Right brachioradialis: 1+  Left brachioradialis: 1+  Right biceps: 1+  Left biceps: 1+  Right triceps: 1+  Left triceps: 1+  Right patellar: 1+  Left patellar: 1+  Right achilles: 1+  Left achilles: 1+  Right plantar: normal  Left plantar: normal      Physical Exam   Constitutional: He is oriented to person, place, and time. He appears well-developed and well-nourished.   HENT:   Head: Normocephalic and atraumatic.   Eyes: Pupils are equal, round, and reactive to light. EOM are normal.   Fundus examination showed sharp discs margins   Neck: Normal range of motion. Neck supple. Carotid bruit is not present.   Cardiovascular: Normal rate, regular rhythm and normal heart sounds.   Aortic bruit projected to the carotids bilaterally   Neurological: He is oriented to person, place, and time. He has normal strength. He has a normal Finger-Nose-Finger Test and a normal Romberg Test. Gait normal.   Reflex Scores:       Tricep reflexes are 1+ on the right side and 1+ on the left side.       Bicep reflexes are 1+ on the right side and 1+ on the left side.       Brachioradialis reflexes are 1+ on the right  side and 1+ on the left side.       Patellar reflexes are 1+ on the right side and 1+ on the left side.       Achilles reflexes are 1+ on the right side and 1+ on the left side.  Psychiatric: His speech is normal.   Vitals reviewed.        Assessment:        1. MCI (mild cognitive impairment)    2. Valvular heart disease    3. 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 Decreased hearing, unspecified laterality    5. S/P aortic valve replacement (bioprosthetic)    6. Essential hypertension            Plan:       Discussed with patient and spouse.  The patient had a spell of this past weekend when he had a near passing out episode with dizziness but recovered almost immediately.  He had some blood pressure fluctuations subsequently.  He is advised to contact his cardiologist to rule out any cardiac etiology for this episode.  Will initiate Namenda 5 mg in the morning daily for to see this might help his cognition.  In addition given information regarding nutritional recommendations for improving cognitive decline.  He will follow up in 6 months if stable..

## 2019-05-23 NOTE — PATIENT INSTRUCTIONS
Discussed with patient and spouse.  The patient had a spell of this past weekend when he had a near passing out episode with dizziness but recovered almost immediately.  He had some blood pressure fluctuations subsequently.  He is advised to contact his cardiologist to rule out any cardiac etiology for this episode.  Will initiate Namenda 5 mg in the morning daily for to see this might help his cognition.  In addition given information regarding nutritional recommendations for improving cognitive decline.

## 2019-06-14 ENCOUNTER — TELEPHONE (OUTPATIENT)
Dept: NEUROLOGY | Facility: CLINIC | Age: 84
End: 2019-06-14

## 2019-06-14 NOTE — TELEPHONE ENCOUNTER
----- Message from Pedrito May MA sent at 6/11/2019  3:32 PM CDT -----  Contact: Dr. King [facility]   Please advise  ----- Message -----  From: Chloe Nash  Sent: 6/11/2019  12:24 PM  To: Sean SCOTT Staff    Name of Who is Calling:Dr. King        What is the request in detail: Physician demanding to speak with Dr. Estrada; refused additional information. Please advise-          Can the clinic reply by [facility]: n    What Number [facility] Back [facility]: [phone]

## 2019-06-14 NOTE — TELEPHONE ENCOUNTER
Spoke to patient spouse who is retired primary care physician.  The patient had a brief spell while aware on the road traveling at which time she noted that her left side was a little clumsy and she took an to the nearest emergency room.  He had a workup done including a CT scan of the head and an MRI scan of the brain both of which were unremarkable showing no acute ischemic events.  A carotid artery ultrasound showed no significant stenosis though there was some minimal plaquing.  The patient has cardiac disease as well as AV valve disease but was not taking aspirin.  This was restarted and Plavix was added for 21 days only.  His statin dose was adjusted.  It was felt that he might have had a TIA.  He is now back to his usual baseline as he recovered within a couple of hours.  She will get me copies of the evaluation when she returns.

## 2019-06-28 ENCOUNTER — OFFICE VISIT (OUTPATIENT)
Dept: CARDIOLOGY | Facility: CLINIC | Age: 84
End: 2019-06-28
Payer: MEDICARE

## 2019-06-28 VITALS
SYSTOLIC BLOOD PRESSURE: 121 MMHG | DIASTOLIC BLOOD PRESSURE: 57 MMHG | BODY MASS INDEX: 25.75 KG/M2 | WEIGHT: 179.88 LBS | HEIGHT: 70 IN | HEART RATE: 80 BPM

## 2019-06-28 DIAGNOSIS — I25.810 CORONARY ARTERY DISEASE INVOLVING CORONARY BYPASS GRAFT OF NATIVE HEART WITHOUT ANGINA PECTORIS: ICD-10-CM

## 2019-06-28 DIAGNOSIS — G45.9 TIA (TRANSIENT ISCHEMIC ATTACK): Primary | ICD-10-CM

## 2019-06-28 DIAGNOSIS — R42 EPISODIC LIGHTHEADEDNESS: ICD-10-CM

## 2019-06-28 DIAGNOSIS — I10 ESSENTIAL HYPERTENSION: ICD-10-CM

## 2019-06-28 DIAGNOSIS — T82.897D AORTIC PROSTHETIC VALVE REGURGITATION, SUBSEQUENT ENCOUNTER: ICD-10-CM

## 2019-06-28 PROCEDURE — 99999 PR PBB SHADOW E&M-EST. PATIENT-LVL III: ICD-10-PCS | Mod: PBBFAC,GC,, | Performed by: STUDENT IN AN ORGANIZED HEALTH CARE EDUCATION/TRAINING PROGRAM

## 2019-06-28 PROCEDURE — 3288F FALL RISK ASSESSMENT DOCD: CPT | Mod: CPTII,GC,S$GLB, | Performed by: STUDENT IN AN ORGANIZED HEALTH CARE EDUCATION/TRAINING PROGRAM

## 2019-06-28 PROCEDURE — 99214 PR OFFICE/OUTPT VISIT, EST, LEVL IV, 30-39 MIN: ICD-10-PCS | Mod: GC,S$GLB,, | Performed by: STUDENT IN AN ORGANIZED HEALTH CARE EDUCATION/TRAINING PROGRAM

## 2019-06-28 PROCEDURE — 3074F PR MOST RECENT SYSTOLIC BLOOD PRESSURE < 130 MM HG: ICD-10-PCS | Mod: CPTII,GC,S$GLB, | Performed by: STUDENT IN AN ORGANIZED HEALTH CARE EDUCATION/TRAINING PROGRAM

## 2019-06-28 PROCEDURE — 3288F PR FALLS RISK ASSESSMENT DOCUMENTED: ICD-10-PCS | Mod: CPTII,GC,S$GLB, | Performed by: STUDENT IN AN ORGANIZED HEALTH CARE EDUCATION/TRAINING PROGRAM

## 2019-06-28 PROCEDURE — 1100F PR PT FALLS ASSESS DOC 2+ FALLS/FALL W/INJURY/YR: ICD-10-PCS | Mod: CPTII,GC,S$GLB, | Performed by: STUDENT IN AN ORGANIZED HEALTH CARE EDUCATION/TRAINING PROGRAM

## 2019-06-28 PROCEDURE — 1100F PTFALLS ASSESS-DOCD GE2>/YR: CPT | Mod: CPTII,GC,S$GLB, | Performed by: STUDENT IN AN ORGANIZED HEALTH CARE EDUCATION/TRAINING PROGRAM

## 2019-06-28 PROCEDURE — 99999 PR PBB SHADOW E&M-EST. PATIENT-LVL III: CPT | Mod: PBBFAC,GC,, | Performed by: STUDENT IN AN ORGANIZED HEALTH CARE EDUCATION/TRAINING PROGRAM

## 2019-06-28 PROCEDURE — 3074F SYST BP LT 130 MM HG: CPT | Mod: CPTII,GC,S$GLB, | Performed by: STUDENT IN AN ORGANIZED HEALTH CARE EDUCATION/TRAINING PROGRAM

## 2019-06-28 PROCEDURE — 3078F PR MOST RECENT DIASTOLIC BLOOD PRESSURE < 80 MM HG: ICD-10-PCS | Mod: CPTII,GC,S$GLB, | Performed by: STUDENT IN AN ORGANIZED HEALTH CARE EDUCATION/TRAINING PROGRAM

## 2019-06-28 PROCEDURE — 99214 OFFICE O/P EST MOD 30 MIN: CPT | Mod: GC,S$GLB,, | Performed by: STUDENT IN AN ORGANIZED HEALTH CARE EDUCATION/TRAINING PROGRAM

## 2019-06-28 PROCEDURE — 3078F DIAST BP <80 MM HG: CPT | Mod: CPTII,GC,S$GLB, | Performed by: STUDENT IN AN ORGANIZED HEALTH CARE EDUCATION/TRAINING PROGRAM

## 2019-06-28 RX ORDER — CLOPIDOGREL BISULFATE 75 MG/1
75 TABLET ORAL DAILY
COMMUNITY
End: 2019-07-01 | Stop reason: SDUPTHER

## 2019-06-28 NOTE — PROGRESS NOTES
"    Cardiology Clinic Note  Reason for Visit: Recent TIA    HPI:   Pt is a 84 year old gentleman who presents today for TIA follow up.     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     2 weeks ago the patient was riding in the car when he started experiencing dysarthria and was "acting funny" per his wife had his water bottle turned side ways without the top. Also has left sided posturing of the hand and foot per the wife. By the time he presented to the ER symptoms had resolved. He was admitted CT/MRI unremarkable, Carotids unremarkable. Lipitor was increased to 80 and he was placed on ASA/Plavix for 30 days.   Feeling well today no complaints.     Review of Systems   All other systems reviewed and are negative.      PMH:     Past Medical History:   Diagnosis Date    Coronary artery disease     Heart murmur     Hyperlipidemia     Hypertension     Valvular regurgitation      Past Surgical History:   Procedure Laterality Date    CARDIAC VALVE SURGERY  2007    CORONARY ARTERY BYPASS GRAFT  2007    x 2     Allergies:   Review of patient's allergies indicates:  No Known Allergies  Medications:     Current Outpatient Medications on File Prior to Visit   Medication Sig Dispense Refill    aspirin (ASPIR-81 ORAL) Take 81 mg/kg/day by mouth once daily.      atorvastatin (LIPITOR) 40 MG tablet Take 1 tablet (40 mg total) by mouth once daily. (Patient taking differently: Take 80 mg by mouth once daily. ) 90 tablet 3    clopidogrel (PLAVIX) 75 mg tablet Take 75 mg by mouth once daily.      co-enzyme Q-10 30 mg capsule Take 3 capsules (90 mg total) by mouth 2 (two) times daily.      losartan-hydrochlorothiazide 50-12.5 mg (HYZAAR) 50-12.5 mg per tablet Take 1 tablet by mouth once daily.       memantine (NAMENDA) 5 MG Tab Take 1 tablet (5 mg total) by mouth every morning. 90 tablet 3    tadalafil (CIALIS) 10 MG tablet Take 1 tablet (10 mg " "total) by mouth daily as needed for Erectile Dysfunction. 10 tablet 11    omeprazole (PRILOSEC) 40 MG capsule        No current facility-administered medications on file prior to visit.      Social History:     Social History     Tobacco Use    Smoking status: Former Smoker     Last attempt to quit: 1976     Years since quittin.5    Smokeless tobacco: Never Used   Substance Use Topics    Alcohol use: Yes     Alcohol/week: 8.4 oz     Types: 7 Glasses of wine, 7 Cans of beer per week     Comment: socially     Family History:     Family History   Problem Relation Age of Onset    Heart attack Neg Hx     Heart disease Neg Hx     Hyperlipidemia Neg Hx     Hypertension Neg Hx        Physical Exam  BP (!) 121/57   Pulse 80   Ht 5' 10" (1.778 m)   Wt 81.6 kg (179 lb 14.3 oz)   BMI 25.81 kg/m²    GEN: Alert and oriented in NAD  NECK: no JVD appreciated   CVS: RRR, s1/s2, no 3/6 sys murmur.   PULM: CTAB no rales  ABD: NT/ND BS +  Extremities: warm and dry, palpable pulses, no edema  NEURO: Alert and oriented x 3  PSYCH: appropriate affect.             Labs:     Lab Results   Component Value Date     (L) 06/15/2016    K 4.9 06/15/2016    CL 99 06/15/2016    CO2 27 06/15/2016    BUN 12 06/15/2016    CREATININE 0.8 06/15/2016    ANIONGAP 8 06/15/2016     No results found for: HGBA1C  No results found for: BNP, BNPTRIAGEBLO Lab Results   Component Value Date    WBC 4.65 06/15/2016    HGB 14.9 06/15/2016    HCT 44.2 06/15/2016     06/15/2016    GRAN 2.0 06/15/2016    GRAN 42.0 06/15/2016     Lab Results   Component Value Date    CHOL 223 (H) 06/15/2016    HDL 61 06/15/2016    LDLCALC 134.8 06/15/2016    TRIG 136 06/15/2016          No results found for: EF    EKG: reviewed    Assessment and Plan  Chang Braswell is a 84 y.o. gentleman here today for recent TIA.     1. TIA (transient ischemic attack)  Recent TIA however was not taking ASA. Now on ASA/Plavix according to [facility] trial for 30 " days. Also on high intensity statin.     2. Aortic prosthetic valve regurgitation, subsequent encounter  Doing well denies any shortness of breath. Would get an echo in another year to revaluate unless symptoms get worse.     3. Essential hypertension  BP well controlled today.     4. Coronary artery disease involving coronary bypass graft of native heart without angina pectoris  No chest pain     5. Episodic lightheadedness  Could be related to orthostasis have encouraged plenty of fluids and getting up slowly.     Signed:        Mali Muniz MD  Cardiology Fellow  Pager [phone]

## 2019-07-01 RX ORDER — ATORVASTATIN CALCIUM 40 MG/1
80 TABLET, FILM COATED ORAL DAILY
Qty: 180 TABLET | Refills: 3 | Status: CANCELLED | OUTPATIENT
Start: 2019-07-01 | End: 2020-06-30

## 2019-07-01 NOTE — TELEPHONE ENCOUNTER
----- Message from Diana Herman sent at 7/1/2019  1:28 PM CDT -----  Contact: pt  Pt says when he saw Dr. Muniz/Dr. Tobin on 6/28/19 they were suppose to call in 5 meds to [facility] and he checked and they have not been received by [facility]. The pt does not know the name of the medicine.  LOV 6/28/19 Dr. Muniz/Mahad  [facility] [place] [id number] [facility] [phone] (Phone)  [phone] (Fax)    Thanks

## 2019-07-02 RX ORDER — ATORVASTATIN CALCIUM 80 MG/1
80 TABLET, FILM COATED ORAL DAILY
Qty: 90 TABLET | Refills: 3 | Status: SHIPPED | OUTPATIENT
Start: 2019-07-02 | End: 2020-06-26 | Stop reason: SDUPTHER

## 2019-07-02 RX ORDER — CLOPIDOGREL BISULFATE 75 MG/1
75 TABLET ORAL DAILY
Qty: 90 TABLET | Refills: 3 | Status: SHIPPED | OUTPATIENT
Start: 2019-07-02 | End: 2020-09-28

## 2019-07-02 RX ORDER — LOSARTAN POTASSIUM AND HYDROCHLOROTHIAZIDE 12.5; 5 MG/1; MG/1
1 TABLET ORAL DAILY
Qty: 90 TABLET | Refills: 3 | Status: SHIPPED | OUTPATIENT
Start: 2019-07-02 | End: 2020-06-26 | Stop reason: SDUPTHER

## 2019-10-01 ENCOUNTER — TELEPHONE (OUTPATIENT)
Dept: CARDIOLOGY | Facility: CLINIC | Age: 84
End: 2019-10-01

## 2019-10-01 NOTE — TELEPHONE ENCOUNTER
[facility] dentist would like to prescribe Amoxicillin but needs ok. Spoke with dental office, [facility] requires clearance, and will fax over form. Fax number for office provided.    Dentist-Rosa Martinez  [phone]  Dental appt- 10/9/19

## 2019-10-01 NOTE — TELEPHONE ENCOUNTER
----- Message from Kamille Cunningham sent at 10/1/2019 10:20 AM CDT -----  Contact: pt  Pls call pt at [phone].  He needs to know if it is ok to take amoxicillin.  He is a pt of Dr. Tobin and was last seen by Dr. Muniz on 6/28/19.    Thank you

## 2020-09-24 ENCOUNTER — TELEPHONE (OUTPATIENT)
Dept: DERMATOLOGY | Facility: CLINIC | Age: 85
End: 2020-09-24

## 2020-09-24 ENCOUNTER — PATIENT MESSAGE (OUTPATIENT)
Dept: DERMATOLOGY | Facility: CLINIC | Age: 85
End: 2020-09-24

## 2020-09-24 NOTE — TELEPHONE ENCOUNTER
----- Message from Ange Fraser sent at 9/24/2020 11:50 AM CDT -----  Regarding: Ref to NP wants to see Rani on canal  Contact: Pt at [phone]  Pt has a bad case of dermatitis per his wife who is a family doctor.  Wants to be seen janett.  Next available is in November.  Pt does not want to go anywhere to upper floors.   Pt and his wife are afraid of covid.  Please call pt.  Wife wants his seen tomorrow if possible.

## 2020-09-28 ENCOUNTER — OFFICE VISIT (OUTPATIENT)
Dept: DERMATOLOGY | Facility: CLINIC | Age: 85
End: 2020-09-28
Payer: MEDICARE

## 2020-09-28 ENCOUNTER — TELEPHONE (OUTPATIENT)
Dept: DERMATOLOGY | Facility: CLINIC | Age: 85
End: 2020-09-28

## 2020-09-28 VITALS — TEMPERATURE: 98 F

## 2020-09-28 DIAGNOSIS — L29.9 PRURITUS: ICD-10-CM

## 2020-09-28 DIAGNOSIS — L30.9 DERMATITIS: ICD-10-CM

## 2020-09-28 DIAGNOSIS — D48.5 NEOPLASM OF UNCERTAIN BEHAVIOR OF SKIN: Primary | ICD-10-CM

## 2020-09-28 PROCEDURE — 88305 TISSUE EXAM BY PATHOLOGIST: CPT | Performed by: PATHOLOGY

## 2020-09-28 PROCEDURE — 88312 SPECIAL STAINS GROUP 1: CPT | Performed by: PATHOLOGY

## 2020-09-28 PROCEDURE — 99203 OFFICE O/P NEW LOW 30 MIN: CPT | Mod: 25,S$GLB,, | Performed by: DERMATOLOGY

## 2020-09-28 PROCEDURE — 1126F AMNT PAIN NOTED NONE PRSNT: CPT | Mod: S$GLB,,, | Performed by: DERMATOLOGY

## 2020-09-28 PROCEDURE — 11104 PR PUNCH BIOPSY, SKIN, SINGLE LESION: ICD-10-PCS | Mod: S$GLB,,, | Performed by: DERMATOLOGY

## 2020-09-28 PROCEDURE — 99203 PR OFFICE/OUTPT VISIT, NEW, LEVL III, 30-44 MIN: ICD-10-PCS | Mod: 25,S$GLB,, | Performed by: DERMATOLOGY

## 2020-09-28 PROCEDURE — 88312 SPECIAL STAINS GROUP 1: CPT | Mod: 26,,, | Performed by: PATHOLOGY

## 2020-09-28 PROCEDURE — 1159F PR MEDICATION LIST DOCUMENTED IN MEDICAL RECORD: ICD-10-PCS | Mod: S$GLB,,, | Performed by: DERMATOLOGY

## 2020-09-28 PROCEDURE — 1159F MED LIST DOCD IN RCRD: CPT | Mod: S$GLB,,, | Performed by: DERMATOLOGY

## 2020-09-28 PROCEDURE — 88305 TISSUE EXAM BY PATHOLOGIST: CPT | Mod: 26,,, | Performed by: PATHOLOGY

## 2020-09-28 PROCEDURE — 1126F PR PAIN SEVERITY QUANTIFIED, NO PAIN PRESENT: ICD-10-PCS | Mod: S$GLB,,, | Performed by: DERMATOLOGY

## 2020-09-28 PROCEDURE — 88312 PR  SPECIAL STAINS,GROUP I: ICD-10-PCS | Mod: 26,,, | Performed by: PATHOLOGY

## 2020-09-28 PROCEDURE — 11104 PUNCH BX SKIN SINGLE LESION: CPT | Mod: S$GLB,,, | Performed by: DERMATOLOGY

## 2020-09-28 PROCEDURE — 1101F PR PT FALLS ASSESS DOC 0-1 FALLS W/OUT INJ PAST YR: ICD-10-PCS | Mod: CPTII,S$GLB,, | Performed by: DERMATOLOGY

## 2020-09-28 PROCEDURE — 1101F PT FALLS ASSESS-DOCD LE1/YR: CPT | Mod: CPTII,S$GLB,, | Performed by: DERMATOLOGY

## 2020-09-28 PROCEDURE — 88305 TISSUE EXAM BY PATHOLOGIST: ICD-10-PCS | Mod: 26,,, | Performed by: PATHOLOGY

## 2020-09-28 RX ORDER — KETOCONAZOLE 20 MG/ML
1 SHAMPOO, SUSPENSION TOPICAL DAILY
Status: ON HOLD | COMMUNITY
End: 2023-03-06 | Stop reason: HOSPADM

## 2020-09-28 RX ORDER — TURMERIC 400 MG
CAPSULE ORAL
COMMUNITY
Start: 2018-09-28

## 2020-09-28 RX ORDER — IBUPROFEN 100 MG/5ML
1000 SUSPENSION, ORAL (FINAL DOSE FORM) ORAL 2 TIMES DAILY
COMMUNITY
Start: 2000-09-28

## 2020-09-28 RX ORDER — ACETAMINOPHEN 500 MG
100 TABLET ORAL 2 TIMES DAILY
COMMUNITY
Start: 2018-09-28

## 2020-09-28 RX ORDER — TRIAMCINOLONE ACETONIDE 1 MG/G
OINTMENT TOPICAL
Qty: 80 G | Refills: 2 | Status: SHIPPED | OUTPATIENT
Start: 2020-09-28 | End: 2021-01-12

## 2020-09-28 RX ORDER — TADALAFIL 10 MG/1
5 TABLET ORAL DAILY
COMMUNITY
End: 2023-02-27

## 2020-09-28 NOTE — TELEPHONE ENCOUNTER
Called PT again several times. Went out into parking lot and checked all cars in parking lot and check front [facility] waiting area.

## 2020-09-28 NOTE — PROGRESS NOTES
"  Subjective:       Patient ID:  Chang Braswell is a 85 y.o. male who presents for   Chief Complaint   Patient presents with    Rash     Rash - Initial  Affected locations: left upper leg, right upper leg, left axilla, right axilla and back (rash started as "inguinal candidiasis" per his wife; that is now improving but the rash is spreading to other areas)  Duration: 3 weeks  Signs / symptoms: itching, redness, spreading and burning  Severity: moderate  Timing: constant  Aggravated by: scratching  Treatments tried: currently using Clotrimazole cream; previously used TAC 0.1% cream; also used coconut oil and arnica in the past.  Improvement on treatment: no relief    No new meds. No new clothes/shirts.    Review of Systems   Constitutional: Negative for fever.   HENT: Negative for mouth sores.    Respiratory: Negative for cough and shortness of breath.    Genitourinary: Negative for genital sores.   Musculoskeletal: Positive for joint swelling and arthralgias (right hand and trigger finger).   Skin: Positive for itching, rash and dry skin.   Hematologic/Lymphatic: Bruises/bleeds easily (due to ASA).        Objective:    Physical Exam   Constitutional: He appears well-developed and well-nourished. No distress.   HENT:   Mouth/Throat: Lips normal.    Eyes: Lids are normal.  No conjunctival no injection.   Neurological: He is alert and oriented to person, place, and time. He is not disoriented.   Psychiatric: He has a normal mood and affect.   Skin:   Areas Examined (abnormalities noted in diagram):   Scalp / Hair Palpated and Inspected  Head / Face Inspection Performed  Neck Inspection Performed  Chest / Axilla Inspection Performed  Abdomen Inspection Performed  Genitals / Buttocks / Groin Inspection Performed  Back Inspection Performed  RUE Inspected  LUE Inspection Performed  RLE Inspected  LLE Inspection Performed  Nails and Digits Inspection Performed              Diagram Legend     Erythematous scaling " macule/papule c/w actinic keratosis       Vascular papule c/w angioma      Pigmented verrucoid papule/plaque c/w seborrheic keratosis      Yellow umbilicated papule c/w sebaceous hyperplasia      Irregularly shaped tan macule c/w lentigo     1-2 mm smooth white papules consistent with Milia      Movable subcutaneous cyst with punctum c/w epidermal inclusion cyst      Subcutaneous movable cyst c/w pilar cyst      Firm pink to brown papule c/w dermatofibroma      Pedunculated fleshy papule(s) c/w skin tag(s)      Evenly pigmented macule c/w junctional nevus     Mildly variegated pigmented, slightly irregular-bordered macule c/w mildly atypical nevus      Flesh colored to evenly pigmented papule c/w intradermal nevus       Pink pearly papule/plaque c/w basal cell carcinoma      Erythematous hyperkeratotic cursted plaque c/w SCC      Surgical scar with no sign of skin cancer recurrence      Open and closed comedones      Inflammatory papules and pustules      Verrucoid papule consistent consistent with wart     Erythematous eczematous patches and plaques     Dystrophic onycholytic nail with subungual debris c/w onychomycosis     Umbilicated papule    Erythematous-base heme-crusted tan verrucoid plaque consistent with inflamed seborrheic keratosis     Erythematous Silvery Scaling Plaque c/w Psoriasis     See annotation      Assessment / Plan:      Pathology Orders:     Normal Orders This Visit    Specimen to Pathology, Dermatology     Comments:    Number of Specimens:->1  ------------------------->-------------------------  Spec 1 Procedure:->Biopsy  Spec 1 Clinical Impression:->r/o allergic contact dermatitis  vs other  Spec 1 Source:->right axilla    Questions:    Procedure Type: Dermatology and skin neoplasms    Number of Specimens: 1    ------------------------: -------------------------    Spec 1 Procedure: Biopsy    Spec 1 Clinical Impression: r/o allergic contact dermatitis vs other    Spec 1 Source: right axilla         Neoplasm of uncertain behavior of skin  -     Specimen to Pathology, Dermatology    Punch biopsy procedure note:  Risk, benefits, and alternatives are discussed with the patient, including risk of infection, scar, recurrence, and need for additional treatment of site. The patient agrees to the procedure by verbal consent. The area is marked and prepped with alcohol.  Approximately 1 mL of lidocaine 1% with epinephrine is used for local anesthesia. A 4 mm punch is used to remove part or all of the lesion. The specimen is sent for pathology. Hemostasis is obtained and the defect is closed with 4-0 nylon. The area is then dressed and bandaged. The patient tolerated the procedure well without adverse event. Written instructions on wound care were given and were reviewed with the patient, who is to call for any signs of bleeding or infection. The patient will be notified of the pathology results.    Dermatitis  -     triamcinolone acetonide 0.1% (KENALOG) 0.1 % ointment; Apply to affected areas of body BID prn rash.  Dispense: 80 g; Refill: 2    Pruritus  Recommended CeraVe Itch Relief cream/lotion or Sarna sensitive lotion. Can store these in the refrigerator for an added cooling effect.      Follow up in about 2 weeks (around 10/12/2020) for follow up, suture removal.

## 2020-09-28 NOTE — TELEPHONE ENCOUNTER
CRISM telling PT to call back at direct line [phone]. Called PT several times back to back and he will not answer.

## 2020-10-05 LAB
FINAL PATHOLOGIC DIAGNOSIS: NORMAL
GROSS: NORMAL
MICROSCOPIC EXAM: NORMAL

## 2020-10-13 ENCOUNTER — PATIENT MESSAGE (OUTPATIENT)
Dept: DERMATOLOGY | Facility: CLINIC | Age: 85
End: 2020-10-13

## 2020-10-15 ENCOUNTER — OFFICE VISIT (OUTPATIENT)
Dept: DERMATOLOGY | Facility: CLINIC | Age: 85
End: 2020-10-15
Payer: MEDICARE

## 2020-10-15 VITALS — TEMPERATURE: 98 F

## 2020-10-15 DIAGNOSIS — L30.8 SPONGIOTIC DERMATITIS: ICD-10-CM

## 2020-10-15 DIAGNOSIS — L57.0 ACTINIC KERATOSIS: Primary | ICD-10-CM

## 2020-10-15 DIAGNOSIS — L82.1 SEBORRHEIC KERATOSIS: ICD-10-CM

## 2020-10-15 DIAGNOSIS — L21.9 SEBORRHEIC DERMATITIS: ICD-10-CM

## 2020-10-15 PROCEDURE — 1159F MED LIST DOCD IN RCRD: CPT | Mod: S$GLB,,, | Performed by: DERMATOLOGY

## 2020-10-15 PROCEDURE — 1101F PT FALLS ASSESS-DOCD LE1/YR: CPT | Mod: CPTII,S$GLB,, | Performed by: DERMATOLOGY

## 2020-10-15 PROCEDURE — 99214 PR OFFICE/OUTPT VISIT, EST, LEVL IV, 30-39 MIN: ICD-10-PCS | Mod: 25,S$GLB,, | Performed by: DERMATOLOGY

## 2020-10-15 PROCEDURE — 17000 DESTRUCT PREMALG LESION: CPT | Mod: S$GLB,,, | Performed by: DERMATOLOGY

## 2020-10-15 PROCEDURE — 99214 OFFICE O/P EST MOD 30 MIN: CPT | Mod: 25,S$GLB,, | Performed by: DERMATOLOGY

## 2020-10-15 PROCEDURE — 1101F PR PT FALLS ASSESS DOC 0-1 FALLS W/OUT INJ PAST YR: ICD-10-PCS | Mod: CPTII,S$GLB,, | Performed by: DERMATOLOGY

## 2020-10-15 PROCEDURE — 17003 DESTRUCT PREMALG LES 2-14: CPT | Mod: S$GLB,,, | Performed by: DERMATOLOGY

## 2020-10-15 PROCEDURE — 17003 DESTRUCTION, PREMALIGNANT LESIONS; SECOND THROUGH 14 LESIONS: ICD-10-PCS | Mod: S$GLB,,, | Performed by: DERMATOLOGY

## 2020-10-15 PROCEDURE — 1126F AMNT PAIN NOTED NONE PRSNT: CPT | Mod: S$GLB,,, | Performed by: DERMATOLOGY

## 2020-10-15 PROCEDURE — 17000 PR DESTRUCTION(LASER SURGERY,CRYOSURGERY,CHEMOSURGERY),PREMALIGNANT LESIONS,FIRST LESION: ICD-10-PCS | Mod: S$GLB,,, | Performed by: DERMATOLOGY

## 2020-10-15 PROCEDURE — 1159F PR MEDICATION LIST DOCUMENTED IN MEDICAL RECORD: ICD-10-PCS | Mod: S$GLB,,, | Performed by: DERMATOLOGY

## 2020-10-15 PROCEDURE — 1126F PR PAIN SEVERITY QUANTIFIED, NO PAIN PRESENT: ICD-10-PCS | Mod: S$GLB,,, | Performed by: DERMATOLOGY

## 2020-10-15 RX ORDER — KETOCONAZOLE 20 MG/ML
SHAMPOO, SUSPENSION TOPICAL
Qty: 240 ML | Refills: 5 | Status: SHIPPED | OUTPATIENT
Start: 2020-10-15

## 2020-10-15 NOTE — PROGRESS NOTES
Subjective:       Patient ID:  Chang Braswell is a 85 y.o. male who presents for   Chief Complaint   Patient presents with    Rash     follow up      Rash - Follow-up  Symptom course: improving  Currently using: Triamcinolone 0.1% ointment.  Affected locations: abdomen  Signs / symptoms: itching  Severity: mild    Growth - Initial  Affected locations: R sideburn area.  Duration: few months.  Signs / symptoms: scaling (raised)  Severity: mild  Timing: constant  Aggravated by: nothing  Relieving factors/Treatments tried: nothing    He also complains of scaling in scalp that has been off and on for years. Improves with Nizoral shampoo.    Review of Systems   Constitutional: Negative for fever.   HENT: Negative for trouble swallowing, lip swelling and tongue swelling.    Eyes: Negative for eye irritation and eyelid inflammation.   Skin: Positive for rash. Negative for itching.        Objective:    Physical Exam   Constitutional: He appears well-developed and well-nourished. No distress.   HENT:   Mouth/Throat: Lips normal.    Eyes: Lids are normal.  No conjunctival no injection.   Neurological: He is alert and oriented to person, place, and time. He is not disoriented.   Psychiatric: He has a normal mood and affect.   Skin:   Areas Examined (abnormalities noted in diagram):   Scalp / Hair Palpated and Inspected  Head / Face Inspection Performed  Neck Inspection Performed  Chest / Axilla Inspection Performed  Abdomen Inspection Performed  RUE Inspected  LUE Inspection Performed                   Diagram Legend     Erythematous scaling macule/papule c/w actinic keratosis       Vascular papule c/w angioma      Pigmented verrucoid papule/plaque c/w seborrheic keratosis      Yellow umbilicated papule c/w sebaceous hyperplasia      Irregularly shaped tan macule c/w lentigo     1-2 mm smooth white papules consistent with Milia      Movable subcutaneous cyst with punctum c/w epidermal inclusion cyst      Subcutaneous  movable cyst c/w pilar cyst      Firm pink to brown papule c/w dermatofibroma      Pedunculated fleshy papule(s) c/w skin tag(s)      Evenly pigmented macule c/w junctional nevus     Mildly variegated pigmented, slightly irregular-bordered macule c/w mildly atypical nevus      Flesh colored to evenly pigmented papule c/w intradermal nevus       Pink pearly papule/plaque c/w basal cell carcinoma      Erythematous hyperkeratotic cursted plaque c/w SCC      Surgical scar with no sign of skin cancer recurrence      Open and closed comedones      Inflammatory papules and pustules      Verrucoid papule consistent consistent with wart     Erythematous eczematous patches and plaques     Dystrophic onycholytic nail with subungual debris c/w onychomycosis     Umbilicated papule    Erythematous-base heme-crusted tan verrucoid plaque consistent with inflamed seborrheic keratosis     Erythematous Silvery Scaling Plaque c/w Psoriasis     See annotation    FINAL PATHOLOGIC DIAGNOSIS  Skin, right axilla, punch biopsy:  -SUBACUTE SPONGIOTIC DERMATITIS WITH EOSINOPHILS  COMMENT: The presence of scattered eosinophils within the infiltrate suggests delayed hypersensitivity  reaction. PAS stain is negative for fungal organisms. Clinical correlation is recommended.    Assessment / Plan:        Actinic keratosis  Cryosurgery procedure note:  Risk, benefits, and alternatives of cryosurgery are discussed with the patient, including but not limited to the risks of hypopigmentation, hyperpigmentation, scar, infection, recurrence of lesion(s), development of new lesion(s), and need for additional treatment of the lesion(s). Verbal consent obtained from patient. Liquid nitrogen cryosurgery applied to 2 lesion(s) to produce a freeze injury. Counseled patient that blisters may form, and instructed patient on wound care with gentle cleansing and use of Vaseline ointment to keep moist until healed. Handout was provided, and patient was instructed  to return to clinic in 1-2 months if lesions do not completely resolve.    Seborrheic keratosis  These are benign, inherited growths without a malignant potential. Reassurance given to patient. No treatment is necessary. Handout was provided.    Seborrheic dermatitis  -     ketoconazole (NIZORAL) 2 % shampoo; Wash scalp with medicated shampoo at least 2x/week. 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 Rec'd that he avoid any unnecessary supplements.   Overall, much improved. Use TAC ointment prn only.  Warned patient about side effects from overuse of topical steroids, including thinning of skin, easy tearing/bruising of skin, stretch marks, spider veins, etc. Patient was instructed to use the topical steroid no more than 2 days per week if used long-term and/or to take breaks from the topical steroid, especially if any of the above side effects are noticed.    Follow up if symptoms worsen or fail to improve.

## 2020-11-14 ENCOUNTER — IMMUNIZATION (OUTPATIENT)
Dept: INTERNAL MEDICINE | Facility: CLINIC | Age: 85
End: 2020-11-14
Payer: MEDICARE

## 2020-12-31 ENCOUNTER — PATIENT MESSAGE (OUTPATIENT)
Dept: ADMINISTRATIVE | Facility: OTHER | Age: 85
End: 2020-12-31

## 2021-01-11 ENCOUNTER — TELEPHONE (OUTPATIENT)
Dept: DERMATOLOGY | Facility: CLINIC | Age: 86
End: 2021-01-11

## 2021-01-12 ENCOUNTER — OFFICE VISIT (OUTPATIENT)
Dept: DERMATOLOGY | Facility: CLINIC | Age: 86
End: 2021-01-12
Payer: MEDICARE

## 2021-01-12 VITALS — TEMPERATURE: 97 F

## 2021-01-12 DIAGNOSIS — B35.3 TINEA PEDIS OF BOTH FEET: ICD-10-CM

## 2021-01-12 DIAGNOSIS — Z79.899 LONG-TERM USE OF HIGH-RISK MEDICATION: ICD-10-CM

## 2021-01-12 DIAGNOSIS — L30.9 DERMATITIS: ICD-10-CM

## 2021-01-12 DIAGNOSIS — D48.5 NEOPLASM OF UNCERTAIN BEHAVIOR OF SKIN: Primary | ICD-10-CM

## 2021-01-12 PROCEDURE — 1126F AMNT PAIN NOTED NONE PRSNT: CPT | Mod: S$GLB,,, | Performed by: DERMATOLOGY

## 2021-01-12 PROCEDURE — 88312 SPECIAL STAINS GROUP 1: CPT | Mod: 26,,, | Performed by: PATHOLOGY

## 2021-01-12 PROCEDURE — 88305 TISSUE EXAM BY PATHOLOGIST: CPT | Mod: 26,,, | Performed by: PATHOLOGY

## 2021-01-12 PROCEDURE — 99214 OFFICE O/P EST MOD 30 MIN: CPT | Mod: 25,S$GLB,, | Performed by: DERMATOLOGY

## 2021-01-12 PROCEDURE — 1159F PR MEDICATION LIST DOCUMENTED IN MEDICAL RECORD: ICD-10-PCS | Mod: S$GLB,,, | Performed by: DERMATOLOGY

## 2021-01-12 PROCEDURE — 88312 PR  SPECIAL STAINS,GROUP I: ICD-10-PCS | Mod: 26,,, | Performed by: PATHOLOGY

## 2021-01-12 PROCEDURE — 88305 TISSUE EXAM BY PATHOLOGIST: CPT | Performed by: PATHOLOGY

## 2021-01-12 PROCEDURE — 3288F PR FALLS RISK ASSESSMENT DOCUMENTED: ICD-10-PCS | Mod: CPTII,S$GLB,, | Performed by: DERMATOLOGY

## 2021-01-12 PROCEDURE — 1159F MED LIST DOCD IN RCRD: CPT | Mod: S$GLB,,, | Performed by: DERMATOLOGY

## 2021-01-12 PROCEDURE — 11104 PR PUNCH BIOPSY, SKIN, SINGLE LESION: ICD-10-PCS | Mod: S$GLB,,, | Performed by: DERMATOLOGY

## 2021-01-12 PROCEDURE — 88305 TISSUE EXAM BY PATHOLOGIST: ICD-10-PCS | Mod: 26,,, | Performed by: PATHOLOGY

## 2021-01-12 PROCEDURE — 99214 PR OFFICE/OUTPT VISIT, EST, LEVL IV, 30-39 MIN: ICD-10-PCS | Mod: 25,S$GLB,, | Performed by: DERMATOLOGY

## 2021-01-12 PROCEDURE — 3288F FALL RISK ASSESSMENT DOCD: CPT | Mod: CPTII,S$GLB,, | Performed by: DERMATOLOGY

## 2021-01-12 PROCEDURE — 88312 SPECIAL STAINS GROUP 1: CPT | Performed by: PATHOLOGY

## 2021-01-12 PROCEDURE — 1101F PT FALLS ASSESS-DOCD LE1/YR: CPT | Mod: CPTII,S$GLB,, | Performed by: DERMATOLOGY

## 2021-01-12 PROCEDURE — 1126F PR PAIN SEVERITY QUANTIFIED, NO PAIN PRESENT: ICD-10-PCS | Mod: S$GLB,,, | Performed by: DERMATOLOGY

## 2021-01-12 PROCEDURE — 1101F PR PT FALLS ASSESS DOC 0-1 FALLS W/OUT INJ PAST YR: ICD-10-PCS | Mod: CPTII,S$GLB,, | Performed by: DERMATOLOGY

## 2021-01-12 PROCEDURE — 11104 PUNCH BX SKIN SINGLE LESION: CPT | Mod: S$GLB,,, | Performed by: DERMATOLOGY

## 2021-01-12 RX ORDER — TRIAMCINOLONE ACETONIDE 1 MG/G
OINTMENT TOPICAL
Qty: 454 G | Refills: 1 | Status: SHIPPED | OUTPATIENT
Start: 2021-01-12 | End: 2021-01-12

## 2021-01-12 RX ORDER — TERBINAFINE HYDROCHLORIDE 250 MG/1
250 TABLET ORAL DAILY
Qty: 14 TABLET | Refills: 0 | Status: SHIPPED | OUTPATIENT
Start: 2021-01-12 | End: 2021-01-12

## 2021-01-12 RX ORDER — TRIAMCINOLONE ACETONIDE 1 MG/G
OINTMENT TOPICAL
Qty: 454 G | Refills: 1 | Status: SHIPPED | OUTPATIENT
Start: 2021-01-12 | End: 2023-02-27

## 2021-01-12 RX ORDER — TERBINAFINE HYDROCHLORIDE 250 MG/1
250 TABLET ORAL DAILY
Qty: 14 TABLET | Refills: 0 | Status: SHIPPED | OUTPATIENT
Start: 2021-01-12 | End: 2021-02-11

## 2021-01-14 ENCOUNTER — LAB VISIT (OUTPATIENT)
Dept: LAB | Facility: HOSPITAL | Age: 86
End: 2021-01-14
Attending: DERMATOLOGY
Payer: MEDICARE

## 2021-01-14 ENCOUNTER — IMMUNIZATION (OUTPATIENT)
Dept: OBSTETRICS AND GYNECOLOGY | Facility: CLINIC | Age: 86
End: 2021-01-14
Payer: MEDICARE

## 2021-01-14 DIAGNOSIS — B35.3 TINEA PEDIS OF BOTH FEET: ICD-10-CM

## 2021-01-14 DIAGNOSIS — Z79.899 LONG-TERM USE OF HIGH-RISK MEDICATION: ICD-10-CM

## 2021-01-14 DIAGNOSIS — Z23 NEED FOR VACCINATION: ICD-10-CM

## 2021-01-14 LAB
BASOPHILS # BLD AUTO: 0.08 K/UL (ref 0–0.2)
BASOPHILS NFR BLD: 1.4 % (ref 0–1.9)
DIFFERENTIAL METHOD: ABNORMAL
EOSINOPHIL # BLD AUTO: 0.8 K/UL (ref 0–0.5)
EOSINOPHIL NFR BLD: 15.1 % (ref 0–8)
ERYTHROCYTE [DISTWIDTH] IN BLOOD BY AUTOMATED COUNT: 13.1 % (ref 11.5–14.5)
HCT VFR BLD AUTO: 46.3 % (ref 40–54)
HGB BLD-MCNC: 14.8 G/DL (ref 14–18)
IMM GRANULOCYTES # BLD AUTO: 0.01 K/UL (ref 0–0.04)
IMM GRANULOCYTES NFR BLD AUTO: 0.2 % (ref 0–0.5)
LYMPHOCYTES # BLD AUTO: 1.6 K/UL (ref 1–4.8)
LYMPHOCYTES NFR BLD: 29.3 % (ref 18–48)
MCH RBC QN AUTO: 29 PG (ref 27–31)
MCHC RBC AUTO-ENTMCNC: 32 G/DL (ref 32–36)
MCV RBC AUTO: 91 FL (ref 82–98)
MONOCYTES # BLD AUTO: 0.5 K/UL (ref 0.3–1)
MONOCYTES NFR BLD: 9.2 % (ref 4–15)
NEUTROPHILS # BLD AUTO: 2.5 K/UL (ref 1.8–7.7)
NEUTROPHILS NFR BLD: 44.8 % (ref 38–73)
NRBC BLD-RTO: 0 /100 WBC
PLATELET # BLD AUTO: 242 K/UL (ref 150–350)
PMV BLD AUTO: 11.8 FL (ref 9.2–12.9)
RBC # BLD AUTO: 5.11 M/UL (ref 4.6–6.2)
WBC # BLD AUTO: 5.56 K/UL (ref 3.9–12.7)

## 2021-01-14 PROCEDURE — 91300 COVID-19, MRNA, LNP-S, PF, 30 MCG/0.3 ML DOSE VACCINE: CPT | Mod: PBBFAC | Performed by: FAMILY MEDICINE

## 2021-01-14 PROCEDURE — 80053 COMPREHEN METABOLIC PANEL: CPT

## 2021-01-14 PROCEDURE — 85025 COMPLETE CBC W/AUTO DIFF WBC: CPT

## 2021-01-14 PROCEDURE — 36415 COLL VENOUS BLD VENIPUNCTURE: CPT | Mod: PO

## 2021-01-15 LAB
ALBUMIN SERPL BCP-MCNC: 3.8 G/DL (ref 3.5–5.2)
ALP SERPL-CCNC: 72 U/L (ref 55–135)
ALT SERPL W/O P-5'-P-CCNC: 19 U/L (ref 10–44)
ANION GAP SERPL CALC-SCNC: 11 MMOL/L (ref 8–16)
AST SERPL-CCNC: 21 U/L (ref 10–40)
BILIRUB SERPL-MCNC: 0.6 MG/DL (ref 0.1–1)
BUN SERPL-MCNC: 15 MG/DL (ref 8–23)
CALCIUM SERPL-MCNC: 9 MG/DL (ref 8.7–10.5)
CHLORIDE SERPL-SCNC: 98 MMOL/L (ref 95–110)
CO2 SERPL-SCNC: 25 MMOL/L (ref 23–29)
CREAT SERPL-MCNC: 0.7 MG/DL (ref 0.5–1.4)
EST. GFR  (AFRICAN AMERICAN): >60 ML/MIN/1.73 M^2
EST. GFR  (NON AFRICAN AMERICAN): >60 ML/MIN/1.73 M^2
GLUCOSE SERPL-MCNC: 55 MG/DL (ref 70–110)
POTASSIUM SERPL-SCNC: 4.9 MMOL/L (ref 3.5–5.1)
PROT SERPL-MCNC: 6.6 G/DL (ref 6–8.4)
SODIUM SERPL-SCNC: 134 MMOL/L (ref 136–145)

## 2021-01-19 LAB
FINAL PATHOLOGIC DIAGNOSIS: NORMAL
GROSS: NORMAL
MICROSCOPIC EXAM: NORMAL

## 2021-01-27 ENCOUNTER — PATIENT MESSAGE (OUTPATIENT)
Dept: DERMATOLOGY | Facility: CLINIC | Age: 86
End: 2021-01-27

## 2021-01-29 ENCOUNTER — OFFICE VISIT (OUTPATIENT)
Dept: DERMATOLOGY | Facility: CLINIC | Age: 86
End: 2021-01-29
Payer: MEDICARE

## 2021-01-29 VITALS — TEMPERATURE: 98 F

## 2021-01-29 DIAGNOSIS — L30.8 SPONGIOTIC DERMATITIS: Primary | ICD-10-CM

## 2021-01-29 DIAGNOSIS — B35.3 TINEA PEDIS OF BOTH FEET: ICD-10-CM

## 2021-01-29 DIAGNOSIS — L81.9 POSTINFLAMMATORY PIGMENTARY CHANGES: ICD-10-CM

## 2021-01-29 DIAGNOSIS — Z79.899 LONG-TERM USE OF HIGH-RISK MEDICATION: ICD-10-CM

## 2021-01-29 DIAGNOSIS — D72.19 EOSINOPHILIC LEUKOCYTOSIS, UNSPECIFIED TYPE: ICD-10-CM

## 2021-01-29 PROCEDURE — 1126F PR PAIN SEVERITY QUANTIFIED, NO PAIN PRESENT: ICD-10-PCS | Mod: S$GLB,,, | Performed by: DERMATOLOGY

## 2021-01-29 PROCEDURE — 99214 OFFICE O/P EST MOD 30 MIN: CPT | Mod: S$GLB,,, | Performed by: DERMATOLOGY

## 2021-01-29 PROCEDURE — 1159F PR MEDICATION LIST DOCUMENTED IN MEDICAL RECORD: ICD-10-PCS | Mod: S$GLB,,, | Performed by: DERMATOLOGY

## 2021-01-29 PROCEDURE — 1159F MED LIST DOCD IN RCRD: CPT | Mod: S$GLB,,, | Performed by: DERMATOLOGY

## 2021-01-29 PROCEDURE — 1101F PR PT FALLS ASSESS DOC 0-1 FALLS W/OUT INJ PAST YR: ICD-10-PCS | Mod: CPTII,S$GLB,, | Performed by: DERMATOLOGY

## 2021-01-29 PROCEDURE — 1126F AMNT PAIN NOTED NONE PRSNT: CPT | Mod: S$GLB,,, | Performed by: DERMATOLOGY

## 2021-01-29 PROCEDURE — 99214 PR OFFICE/OUTPT VISIT, EST, LEVL IV, 30-39 MIN: ICD-10-PCS | Mod: S$GLB,,, | Performed by: DERMATOLOGY

## 2021-01-29 PROCEDURE — 3288F FALL RISK ASSESSMENT DOCD: CPT | Mod: CPTII,S$GLB,, | Performed by: DERMATOLOGY

## 2021-01-29 PROCEDURE — 3288F PR FALLS RISK ASSESSMENT DOCUMENTED: ICD-10-PCS | Mod: CPTII,S$GLB,, | Performed by: DERMATOLOGY

## 2021-01-29 PROCEDURE — 1101F PT FALLS ASSESS-DOCD LE1/YR: CPT | Mod: CPTII,S$GLB,, | Performed by: DERMATOLOGY

## 2021-02-04 ENCOUNTER — IMMUNIZATION (OUTPATIENT)
Dept: OBSTETRICS AND GYNECOLOGY | Facility: CLINIC | Age: 86
End: 2021-02-04
Payer: MEDICARE

## 2021-02-04 DIAGNOSIS — Z23 NEED FOR VACCINATION: Primary | ICD-10-CM

## 2021-02-04 PROCEDURE — 0002A COVID-19, MRNA, LNP-S, PF, 30 MCG/0.3 ML DOSE VACCINE: CPT | Mod: PBBFAC | Performed by: FAMILY MEDICINE

## 2021-02-04 PROCEDURE — 91300 COVID-19, MRNA, LNP-S, PF, 30 MCG/0.3 ML DOSE VACCINE: CPT | Mod: PBBFAC | Performed by: FAMILY MEDICINE

## 2021-05-17 ENCOUNTER — TELEPHONE (OUTPATIENT)
Dept: CARDIOLOGY | Facility: CLINIC | Age: 86
End: 2021-05-17

## 2021-06-01 ENCOUNTER — LAB VISIT (OUTPATIENT)
Dept: LAB | Facility: HOSPITAL | Age: 86
End: 2021-06-01
Attending: INTERNAL MEDICINE
Payer: MEDICARE

## 2021-06-01 ENCOUNTER — OFFICE VISIT (OUTPATIENT)
Dept: CARDIOLOGY | Facility: CLINIC | Age: 86
End: 2021-06-01
Payer: MEDICARE

## 2021-06-01 VITALS
SYSTOLIC BLOOD PRESSURE: 149 MMHG | BODY MASS INDEX: 25.85 KG/M2 | DIASTOLIC BLOOD PRESSURE: 71 MMHG | HEART RATE: 64 BPM | WEIGHT: 180.56 LBS | HEIGHT: 70 IN

## 2021-06-01 DIAGNOSIS — I25.810 CORONARY ARTERY DISEASE INVOLVING CORONARY BYPASS GRAFT OF NATIVE HEART WITHOUT ANGINA PECTORIS: Primary | ICD-10-CM

## 2021-06-01 DIAGNOSIS — T82.897D AORTIC PROSTHETIC VALVE REGURGITATION, SUBSEQUENT ENCOUNTER: ICD-10-CM

## 2021-06-01 DIAGNOSIS — I10 ESSENTIAL HYPERTENSION: ICD-10-CM

## 2021-06-01 DIAGNOSIS — Z95.2 S/P AORTIC VALVE REPLACEMENT: ICD-10-CM

## 2021-06-01 LAB
ANION GAP SERPL CALC-SCNC: 8 MMOL/L (ref 8–16)
BUN SERPL-MCNC: 16 MG/DL (ref 8–23)
CALCIUM SERPL-MCNC: 9.7 MG/DL (ref 8.7–10.5)
CHLORIDE SERPL-SCNC: 93 MMOL/L (ref 95–110)
CO2 SERPL-SCNC: 28 MMOL/L (ref 23–29)
CREAT SERPL-MCNC: 0.7 MG/DL (ref 0.5–1.4)
EST. GFR  (AFRICAN AMERICAN): >60 ML/MIN/1.73 M^2
EST. GFR  (NON AFRICAN AMERICAN): >60 ML/MIN/1.73 M^2
GLUCOSE SERPL-MCNC: 87 MG/DL (ref 70–110)
POTASSIUM SERPL-SCNC: 4.4 MMOL/L (ref 3.5–5.1)
SODIUM SERPL-SCNC: 129 MMOL/L (ref 136–145)

## 2021-06-01 PROCEDURE — 1159F PR MEDICATION LIST DOCUMENTED IN MEDICAL RECORD: ICD-10-PCS | Mod: S$GLB,,, | Performed by: INTERNAL MEDICINE

## 2021-06-01 PROCEDURE — 99214 OFFICE O/P EST MOD 30 MIN: CPT | Mod: S$GLB,,, | Performed by: INTERNAL MEDICINE

## 2021-06-01 PROCEDURE — 99214 PR OFFICE/OUTPT VISIT, EST, LEVL IV, 30-39 MIN: ICD-10-PCS | Mod: S$GLB,,, | Performed by: INTERNAL MEDICINE

## 2021-06-01 PROCEDURE — 99999 PR PBB SHADOW E&M-EST. PATIENT-LVL III: ICD-10-PCS | Mod: PBBFAC,,, | Performed by: INTERNAL MEDICINE

## 2021-06-01 PROCEDURE — 1126F PR PAIN SEVERITY QUANTIFIED, NO PAIN PRESENT: ICD-10-PCS | Mod: S$GLB,,, | Performed by: INTERNAL MEDICINE

## 2021-06-01 PROCEDURE — 80048 BASIC METABOLIC PNL TOTAL CA: CPT | Performed by: INTERNAL MEDICINE

## 2021-06-01 PROCEDURE — 1126F AMNT PAIN NOTED NONE PRSNT: CPT | Mod: S$GLB,,, | Performed by: INTERNAL MEDICINE

## 2021-06-01 PROCEDURE — 1159F MED LIST DOCD IN RCRD: CPT | Mod: S$GLB,,, | Performed by: INTERNAL MEDICINE

## 2021-06-01 PROCEDURE — 36415 COLL VENOUS BLD VENIPUNCTURE: CPT | Performed by: INTERNAL MEDICINE

## 2021-06-01 PROCEDURE — 99999 PR PBB SHADOW E&M-EST. PATIENT-LVL III: CPT | Mod: PBBFAC,,, | Performed by: INTERNAL MEDICINE

## 2021-06-01 RX ORDER — AMLODIPINE BESYLATE 5 MG/1
TABLET ORAL
COMMUNITY
Start: 2021-05-07 | End: 2021-06-01 | Stop reason: SDUPTHER

## 2021-06-01 RX ORDER — FINASTERIDE 5 MG/1
TABLET, FILM COATED ORAL
COMMUNITY
Start: 2021-05-06

## 2021-06-01 RX ORDER — AMLODIPINE BESYLATE 10 MG/1
10 TABLET ORAL DAILY
Qty: 90 TABLET | Refills: 3 | Status: SHIPPED | OUTPATIENT
Start: 2021-06-01

## 2021-09-27 ENCOUNTER — IMMUNIZATION (OUTPATIENT)
Dept: INTERNAL MEDICINE | Facility: CLINIC | Age: 86
End: 2021-09-27
Payer: MEDICARE

## 2021-09-27 DIAGNOSIS — Z23 NEED FOR VACCINATION: Primary | ICD-10-CM

## 2021-09-27 PROCEDURE — 91300 COVID-19, MRNA, LNP-S, PF, 30 MCG/0.3 ML DOSE VACCINE: CPT | Mod: PBBFAC | Performed by: INTERNAL MEDICINE

## 2021-09-27 PROCEDURE — 0003A COVID-19, MRNA, LNP-S, PF, 30 MCG/0.3 ML DOSE VACCINE: CPT | Mod: CV19,PBBFAC | Performed by: INTERNAL MEDICINE

## 2021-10-09 ENCOUNTER — IMMUNIZATION (OUTPATIENT)
Dept: INTERNAL MEDICINE | Facility: CLINIC | Age: 86
End: 2021-10-09
Payer: MEDICARE

## 2021-10-09 PROCEDURE — 90694 VACC AIIV4 NO PRSRV 0.5ML IM: CPT | Mod: S$GLB,,, | Performed by: FAMILY MEDICINE

## 2021-10-09 PROCEDURE — 90694 FLU VACCINE - QUADRIVALENT - ADJUVANTED: ICD-10-PCS | Mod: S$GLB,,, | Performed by: FAMILY MEDICINE

## 2021-10-09 PROCEDURE — G0008 FLU VACCINE - QUADRIVALENT - ADJUVANTED: ICD-10-PCS | Mod: S$GLB,,, | Performed by: FAMILY MEDICINE

## 2021-10-09 PROCEDURE — G0008 ADMIN INFLUENZA VIRUS VAC: HCPCS | Mod: S$GLB,,, | Performed by: FAMILY MEDICINE

## 2022-09-02 ENCOUNTER — TELEPHONE (OUTPATIENT)
Dept: NEUROLOGY | Facility: CLINIC | Age: 87
End: 2022-09-02

## 2022-09-02 NOTE — TELEPHONE ENCOUNTER
Left message on Mr. Downing's voicemail requesting a call back in regards to scheduling.    (Resident clinic)

## 2022-09-02 NOTE — TELEPHONE ENCOUNTER
----- Message from Eloise Ramirez MA sent at 8/9/2022 12:19 PM CDT -----  Good afternoon,    We received a referral from Dr. Manley for this patient to be seen in neurology. The referral is for eval and treat memory loss and gait instability. I have scanned the referral and records into . Please review and contact to schedule.    Thank you   [facility]  Ext [phone]

## 2022-10-01 ENCOUNTER — HOSPITAL ENCOUNTER (OUTPATIENT)
Facility: HOSPITAL | Age: 87
Discharge: HOME | End: 2022-10-02
Attending: EMERGENCY MEDICINE | Admitting: EMERGENCY MEDICINE
Payer: MEDICARE

## 2022-10-01 VITALS
HEART RATE: 60 BPM | DIASTOLIC BLOOD PRESSURE: 77 MMHG | TEMPERATURE: 98 F | WEIGHT: 180 LBS | SYSTOLIC BLOOD PRESSURE: 180 MMHG | OXYGEN SATURATION: 98 % | RESPIRATION RATE: 18 BRPM | BODY MASS INDEX: 25.77 KG/M2 | HEIGHT: 70 IN

## 2022-10-01 DIAGNOSIS — I44.7 NEW ONSET LEFT BUNDLE BRANCH BLOCK (LBBB): ICD-10-CM

## 2022-10-01 DIAGNOSIS — R07.9 CHEST PAIN: ICD-10-CM

## 2022-10-01 DIAGNOSIS — R53.83 FATIGUE: ICD-10-CM

## 2022-10-01 DIAGNOSIS — R94.31 ABNORMAL EKG: ICD-10-CM

## 2022-10-01 DIAGNOSIS — I10 HYPERTENSION: ICD-10-CM

## 2022-10-01 LAB
ALBUMIN SERPL BCP-MCNC: 4 G/DL (ref 3.5–5.2)
ALP SERPL-CCNC: 82 U/L (ref 55–135)
ALT SERPL W/O P-5'-P-CCNC: 14 U/L (ref 10–44)
ANION GAP SERPL CALC-SCNC: 9 MMOL/L (ref 8–16)
AST SERPL-CCNC: 20 U/L (ref 10–40)
BASOPHILS # BLD AUTO: 0.05 K/UL (ref 0–0.2)
BASOPHILS NFR BLD: 1 % (ref 0–1.9)
BILIRUB SERPL-MCNC: 0.6 MG/DL (ref 0.1–1)
BUN SERPL-MCNC: 19 MG/DL (ref 8–23)
CALCIUM SERPL-MCNC: 10.2 MG/DL (ref 8.7–10.5)
CHLORIDE SERPL-SCNC: 98 MMOL/L (ref 95–110)
CO2 SERPL-SCNC: 26 MMOL/L (ref 23–29)
CREAT SERPL-MCNC: 0.8 MG/DL (ref 0.5–1.4)
DIFFERENTIAL METHOD: NORMAL
EOSINOPHIL # BLD AUTO: 0.1 K/UL (ref 0–0.5)
EOSINOPHIL NFR BLD: 2.1 % (ref 0–8)
ERYTHROCYTE [DISTWIDTH] IN BLOOD BY AUTOMATED COUNT: 13 % (ref 11.5–14.5)
EST. GFR  (NO RACE VARIABLE): >60 ML/MIN/1.73 M^2
GLUCOSE SERPL-MCNC: 103 MG/DL (ref 70–110)
HCT VFR BLD AUTO: 48.8 % (ref 40–54)
HGB BLD-MCNC: 16 G/DL (ref 14–18)
IMM GRANULOCYTES # BLD AUTO: 0.01 K/UL (ref 0–0.04)
IMM GRANULOCYTES NFR BLD AUTO: 0.2 % (ref 0–0.5)
LYMPHOCYTES # BLD AUTO: 1 K/UL (ref 1–4.8)
LYMPHOCYTES NFR BLD: 20.3 % (ref 18–48)
MCH RBC QN AUTO: 29 PG (ref 27–31)
MCHC RBC AUTO-ENTMCNC: 32.8 G/DL (ref 32–36)
MCV RBC AUTO: 88 FL (ref 82–98)
MONOCYTES # BLD AUTO: 0.3 K/UL (ref 0.3–1)
MONOCYTES NFR BLD: 5.8 % (ref 4–15)
NEUTROPHILS # BLD AUTO: 3.6 K/UL (ref 1.8–7.7)
NEUTROPHILS NFR BLD: 70.6 % (ref 38–73)
NRBC BLD-RTO: 0 /100 WBC
PLATELET # BLD AUTO: 211 K/UL (ref 150–450)
PMV BLD AUTO: 10.3 FL (ref 9.2–12.9)
POTASSIUM SERPL-SCNC: 4.5 MMOL/L (ref 3.5–5.1)
PROT SERPL-MCNC: 7.8 G/DL (ref 6–8.4)
RBC # BLD AUTO: 5.52 M/UL (ref 4.6–6.2)
SODIUM SERPL-SCNC: 133 MMOL/L (ref 136–145)
TROPONIN I SERPL DL<=0.01 NG/ML-MCNC: 0.01 NG/ML (ref 0–0.03)
TROPONIN I SERPL DL<=0.01 NG/ML-MCNC: 0.03 NG/ML (ref 0–0.03)
WBC # BLD AUTO: 5.13 K/UL (ref 3.9–12.7)

## 2022-10-01 PROCEDURE — 85025 COMPLETE CBC W/AUTO DIFF WBC: CPT

## 2022-10-01 PROCEDURE — 93010 EKG 12-LEAD: ICD-10-PCS | Mod: ,,, | Performed by: INTERNAL MEDICINE

## 2022-10-01 PROCEDURE — 93010 ELECTROCARDIOGRAM REPORT: CPT | Mod: ,,, | Performed by: INTERNAL MEDICINE

## 2022-10-01 PROCEDURE — 93005 ELECTROCARDIOGRAM TRACING: CPT

## 2022-10-01 PROCEDURE — 99285 EMERGENCY DEPT VISIT HI MDM: CPT | Mod: 25

## 2022-10-01 PROCEDURE — 80053 COMPREHEN METABOLIC PANEL: CPT

## 2022-10-01 PROCEDURE — G0378 HOSPITAL OBSERVATION PER HR: HCPCS

## 2022-10-01 PROCEDURE — 99284 EMERGENCY DEPT VISIT MOD MDM: CPT | Mod: ,,, | Performed by: EMERGENCY MEDICINE

## 2022-10-01 PROCEDURE — 84484 ASSAY OF TROPONIN QUANT: CPT | Mod: 91

## 2022-10-01 PROCEDURE — 99284 PR EMERGENCY DEPT VISIT,LEVEL IV: ICD-10-PCS | Mod: ,,, | Performed by: EMERGENCY MEDICINE

## 2022-10-01 NOTE — ED PROVIDER NOTES
Encounter Date: 10/1/2022       History     Chief Complaint   Patient presents with    Fatigue     Chang Braswell is a 87 y.o. male with PMH of CAD s/p CABG, s/p bioprosthetic AV replacement with central regurgitation, and TIA presenting to [facility] ED for fatigue and weakness.  At 1:30 p.m. today patient felt dizzy and diaphoretic for about 15 minutes while vacuuming.  After resting and drinking some water his symptoms completely resolved, he was just feeling overall tired and weak.  He forgot to take his blood pressure medications this morning.  He took them around 2:30/3:00 p.m. and subsequently vomited them up.  Took them again and he was able to keep them down. He and his wife are mainly here tonight to make sure nothing else is going on because they are traveling next week. His blood pressure usually 140/80 at home, on presentation hypertensive 180/80s.  Denies chest pain, shortness of breath, abdominal pain, nausea, diarrhea, changes in vision.         Review of patient's allergies indicates:  No Known Allergies  Past Medical History:   Diagnosis Date    Coronary artery disease     Heart murmur     Hyperlipidemia     Hypertension     Mechanical heart valve present     Valvular regurgitation      Past Surgical History:   Procedure Laterality Date    CARDIAC VALVE SURGERY      CORONARY ARTERY BYPASS GRAFT  2007    x 2     Family History   Problem Relation Age of Onset    Heart attack Neg Hx     Heart disease Neg Hx     Hyperlipidemia Neg Hx     Hypertension Neg Hx     Melanoma Neg Hx      Social History     Tobacco Use    Smoking status: Former     Types: Cigarettes     Quit date: 1976     Years since quittin.7    Smokeless tobacco: Never   Substance Use Topics    Alcohol use: Yes     Alcohol/week: 14.0 standard drinks     Types: 7 Glasses of wine, 7 Cans of beer per week     Comment: socially     Review of Systems   Constitutional:  Positive for activity change, diaphoresis and fatigue. Negative  for fever.   HENT:  Negative for congestion and sinus pressure.    Eyes:  Negative for visual disturbance.   Respiratory:  Negative for cough, chest tightness, shortness of breath and wheezing.    Gastrointestinal:  Positive for vomiting. Negative for abdominal distention, abdominal pain, constipation, diarrhea and nausea.   Genitourinary:  Negative for difficulty urinating.   Neurological:  Positive for dizziness, weakness and light-headedness. Negative for syncope, facial asymmetry and headaches.   Psychiatric/Behavioral:  Negative for agitation.      Physical Exam     Initial Vitals [10/01/22 1544]   BP Pulse Resp Temp SpO2   (!) 195/82 (!) 57 16 -- 97 %      MAP       --         Physical Exam    Nursing note and vitals reviewed.  Constitutional: He appears well-developed and well-nourished. He is not diaphoretic. No distress.   HENT:   Head: Normocephalic and atraumatic.   Eyes: Conjunctivae are normal. No scleral icterus.   Cardiovascular:  Normal rate and intact distal pulses.           Pulmonary/Chest: Breath sounds normal. No respiratory distress. He has no wheezes. He has no rales.   Abdominal: Abdomen is soft. He exhibits no distension. There is no abdominal tenderness.   Musculoskeletal:         General: No edema. Normal range of motion.     Neurological: He is alert and oriented to person, place, and time. He has normal strength. No cranial nerve deficit or sensory deficit. GCS score is 15. GCS eye subscore is 4. GCS verbal subscore is 5. GCS motor subscore is 6.   Skin: Skin is warm.   Psychiatric: He has a normal mood and affect.       ED Course   Procedures  Labs Reviewed   CBC W/ AUTO DIFFERENTIAL   COMPREHENSIVE METABOLIC PANEL   TROPONIN I   TROPONIN I          Imaging Results    None          Medications - No data to display  Medical Decision Making:   Initial Assessment:   Chang Braswell is a 87 y.o. male with PMH of CAD s/p CABG, s/p bioprosthetic AV replacement with central regurgitation,  and TIA presenting to [facility] [place] [place] ED for fatigue and weakness.   Differential Diagnosis:   Unstable angina  Hypertension  ACS - EKG r/o STEMI  ED Management:  Pt main concern on presentation was the diaphoresis he experienced while vacuuming today @1:30 PM knowing it is a symptom of heart attack. EKG on arrival shows no ischemic changes, with known LBBB. Presented to ED hypertensive in no acute distress with no complaints. Initial workup included Troponin's, CMP, and CBC.  Two sets of troponins completed within normal limits, but upward trend.  Continues to deny symptoms of chest pain or shortness of breath at this time.    Patient will be admitted for observation.  Other:   I discussed test(s) with the performing physician.          Attending Attestation:   Physician Attestation Statement for Resident:  As the supervising MD   Physician Attestation Statement: I have personally seen and examined this patient.   I agree with the above history.  -:   As the supervising MD I agree with the above PE.     As the supervising MD I agree with the above treatment, course, plan, and disposition.                  ED Course as of 10/01/22 2319   Sat Oct 01, 2022   1919 CBC auto differential [CR]   1922 Comprehensive metabolic panel(!) [CR]   1926 Troponin I #1 [CR]   1959 /70  [CR]   2251 Troponin I #2 [CR]   2254 Troponin I: 0.026 [CR]      ED Course User Index  [CR] Clark Ba DO                   Clinical Impression:   Final diagnoses:  [R53.83] Fatigue               Clark Ba DO  Resident  10/01/22 [id number]       David Castro MD  10/02/22 [id number]

## 2022-10-01 NOTE — ED TRIAGE NOTES
Patient presents to the ER via POV by his wife. Patient c/o sweating and feeling weak, patient was concerned for having a heart attack. Patient denies any chest pain or pain in general. Patient does report having an episode of vomiting. Patient ambulatory with a steady gait.

## 2022-10-02 PROBLEM — R53.83 FATIGUE: Status: ACTIVE | Noted: 2022-10-02

## 2022-10-02 PROBLEM — I16.0 HYPERTENSIVE URGENCY: Status: ACTIVE | Noted: 2022-10-02

## 2022-10-02 LAB — TROPONIN I SERPL DL<=0.01 NG/ML-MCNC: 0.01 NG/ML (ref 0–0.03)

## 2022-10-02 PROCEDURE — G0378 HOSPITAL OBSERVATION PER HR: HCPCS

## 2022-10-02 RX ORDER — ACETAMINOPHEN 500 MG
1000 TABLET ORAL EVERY 8 HOURS PRN
Status: DISCONTINUED | OUTPATIENT
Start: 2022-10-02 | End: 2022-10-02 | Stop reason: HOSPADM

## 2022-10-02 RX ORDER — LOSARTAN POTASSIUM AND HYDROCHLOROTHIAZIDE 12.5; 5 MG/1; MG/1
1 TABLET ORAL DAILY
Status: DISCONTINUED | OUTPATIENT
Start: 2022-10-02 | End: 2022-10-02 | Stop reason: HOSPADM

## 2022-10-02 RX ORDER — MAG HYDROX/ALUMINUM HYD/SIMETH 200-200-20
30 SUSPENSION, ORAL (FINAL DOSE FORM) ORAL 4 TIMES DAILY PRN
Status: DISCONTINUED | OUTPATIENT
Start: 2022-10-02 | End: 2022-10-02 | Stop reason: HOSPADM

## 2022-10-02 RX ORDER — ATORVASTATIN CALCIUM 40 MG/1
80 TABLET, FILM COATED ORAL DAILY
Status: DISCONTINUED | OUTPATIENT
Start: 2022-10-02 | End: 2022-10-02 | Stop reason: HOSPADM

## 2022-10-02 RX ORDER — ACETAMINOPHEN 325 MG/1
650 TABLET ORAL EVERY 4 HOURS PRN
Status: DISCONTINUED | OUTPATIENT
Start: 2022-10-02 | End: 2022-10-02 | Stop reason: HOSPADM

## 2022-10-02 RX ORDER — ASPIRIN 81 MG/1
81 TABLET ORAL DAILY
Status: DISCONTINUED | OUTPATIENT
Start: 2022-10-02 | End: 2022-10-02 | Stop reason: HOSPADM

## 2022-10-02 RX ORDER — SODIUM CHLORIDE 0.9 % (FLUSH) 0.9 %
5 SYRINGE (ML) INJECTION
Status: DISCONTINUED | OUTPATIENT
Start: 2022-10-02 | End: 2022-10-02 | Stop reason: HOSPADM

## 2022-10-02 RX ORDER — IBUPROFEN 200 MG
24 TABLET ORAL
Status: DISCONTINUED | OUTPATIENT
Start: 2022-10-02 | End: 2022-10-02 | Stop reason: HOSPADM

## 2022-10-02 RX ORDER — IPRATROPIUM BROMIDE AND ALBUTEROL SULFATE 2.5; .5 MG/3ML; MG/3ML
3 SOLUTION RESPIRATORY (INHALATION) EVERY 4 HOURS PRN
Status: DISCONTINUED | OUTPATIENT
Start: 2022-10-02 | End: 2022-10-02 | Stop reason: HOSPADM

## 2022-10-02 RX ORDER — ENOXAPARIN SODIUM 100 MG/ML
40 INJECTION SUBCUTANEOUS EVERY 24 HOURS
Status: DISCONTINUED | OUTPATIENT
Start: 2022-10-02 | End: 2022-10-02 | Stop reason: HOSPADM

## 2022-10-02 RX ORDER — SIMETHICONE 80 MG
1 TABLET,CHEWABLE ORAL 4 TIMES DAILY PRN
Status: DISCONTINUED | OUTPATIENT
Start: 2022-10-02 | End: 2022-10-02 | Stop reason: HOSPADM

## 2022-10-02 RX ORDER — NALOXONE HCL 0.4 MG/ML
0.02 VIAL (ML) INJECTION
Status: DISCONTINUED | OUTPATIENT
Start: 2022-10-02 | End: 2022-10-02 | Stop reason: HOSPADM

## 2022-10-02 RX ORDER — AMLODIPINE BESYLATE 10 MG/1
10 TABLET ORAL DAILY
Status: DISCONTINUED | OUTPATIENT
Start: 2022-10-02 | End: 2022-10-02 | Stop reason: HOSPADM

## 2022-10-02 RX ORDER — POLYETHYLENE GLYCOL 3350 17 G/17G
17 POWDER, FOR SOLUTION ORAL DAILY
Status: DISCONTINUED | OUTPATIENT
Start: 2022-10-02 | End: 2022-10-02 | Stop reason: HOSPADM

## 2022-10-02 RX ORDER — PROCHLORPERAZINE EDISYLATE 5 MG/ML
5 INJECTION INTRAMUSCULAR; INTRAVENOUS EVERY 6 HOURS PRN
Status: DISCONTINUED | OUTPATIENT
Start: 2022-10-02 | End: 2022-10-02 | Stop reason: HOSPADM

## 2022-10-02 RX ORDER — TALC
6 POWDER (GRAM) TOPICAL NIGHTLY PRN
Status: DISCONTINUED | OUTPATIENT
Start: 2022-10-02 | End: 2022-10-02 | Stop reason: HOSPADM

## 2022-10-02 RX ORDER — IBUPROFEN 200 MG
16 TABLET ORAL
Status: DISCONTINUED | OUTPATIENT
Start: 2022-10-02 | End: 2022-10-02 | Stop reason: HOSPADM

## 2022-10-02 RX ORDER — BISACODYL 10 MG
10 SUPPOSITORY, RECTAL RECTAL DAILY PRN
Status: DISCONTINUED | OUTPATIENT
Start: 2022-10-02 | End: 2022-10-02 | Stop reason: HOSPADM

## 2022-10-02 RX ORDER — GLUCAGON 1 MG
1 KIT INJECTION
Status: DISCONTINUED | OUTPATIENT
Start: 2022-10-02 | End: 2022-10-02 | Stop reason: HOSPADM

## 2022-10-02 RX ORDER — ONDANSETRON 8 MG/1
8 TABLET, ORALLY DISINTEGRATING ORAL EVERY 8 HOURS PRN
Status: DISCONTINUED | OUTPATIENT
Start: 2022-10-02 | End: 2022-10-02 | Stop reason: HOSPADM

## 2022-10-02 NOTE — ED NOTES
LOC: The patient is awake, alert and aware of environment with an appropriate affect, the patient is oriented x 3 and speaking appropriately.   APPEARANCE: Patient appears comfortable and in no acute distress, patient is clean and well groomed.  SKIN: The skin is warm and dry, color consistent with ethnicity, patient has normal skin turgor and moist mucus membranes, skin intact, no breakdown or bruising noted.   MUSCULOSKELETAL: Patient moving all extremities spontaneously, no swelling noted.  RESPIRATORY: Airway is open and patent, respirations are spontaneous, patient has a normal effort and rate, no accessory muscle use noted, pt placed on continuous pulse ox with O2 sats noted at 95% on room air.  CARDIAC: Pt placed on cardiac monitor. Patient has a normal rate and regular rhythm, no edema noted, capillary refill < 3 seconds.   GASTRO: Soft and non tender to palpation, no distention noted, normoactive bowel sounds present in all four quadrants. Pt states bowel movements have been regular. Patient does report having an episode of nausea and vomiting prior to arriving to the ER, currently not nauseous.   : Pt denies any pain or frequency with urination.  NEURO: Pt opens eyes spontaneously, behavior appropriate to situation, follows commands, facial expression symmetrical, bilateral hand grasp equal and even, purposeful motor response noted, normal sensation in all extremities when touched with a finger. Patient reports prior to arrival having an episode of sweating, dizziness, and feeling weak/fatigued. Patient currently states he is feeling fine, he was concerned for having a heart attack.     Patient ambulatory with a steady gait.

## 2022-10-02 NOTE — ED NOTES
Pt care assumed. Report received by DA Laird. Pt lying in stretcher in low and locked position and side rails raised x2. Call light, pt's belongings, and bedside table within pt's reach. Pt on continuous cardiac monitoring, pulse oximetry, and BP cycling every 30 minutes. Pt in NAD and verbalized no needs at this time. Family member x1 at bedside.

## 2022-10-02 NOTE — ED NOTES
Patient and pt's wife decline EKG ordered at 0008.  Pt's wife states he's already had two EKGs and wants to talk to the hospitalist as to why he's being ordered a third.

## 2022-10-02 NOTE — ED NOTES
Pt.'s wife states she and Pt are leaving. States they have been waiting on paperwork for two hours and would like to go home. RN spoke with BUCK Gonzalez with [facility] and states Pt is discharged. RN removed pt.'s IV at this time and printed AVS. Pt refuses any further vital signs at this time.

## 2022-10-02 NOTE — HPI
Chang Braswell is a 87 y.o. male with PMH of CAD s/p CABG, s/p bioprosthetic AV replacement with central regurgitation, and TIA [place] [facility] [place] and weakness. Patient's wife is physician and was concerned that patient was experiencing a cardiac event.  At 1:30 p.m. today patient felt dizzy and diaphoretic for about 15 minutes while vacuuming.  After resting and drinking some water his symptoms completely resolved, he was just feeling overall tired and weak.  He forgot to take his blood pressure medications this morning.  He took them around 2:30/3:00 p.m. and subsequently vomited them up.  Took them again and he was able to keep them down. He and his wife are mainly here tonight to make sure nothing else is going on because they are traveling next week. His blood pressure usually 140/80 at home, on presentation hypertensive 180/80s.  Denies chest pain, shortness of breath, abdominal pain, nausea, diarrhea, changes in vision.     In the ED, hypertensive 180s/80s, otherwise AFVSS. . Cr 0.8 (baseline). CBC wnl. trop 0.015>0.026. EKG with Sinus bradycardia with 1st degree A-V block. Left bundle branch block (known). CXR no acute intrathoracic process.

## 2022-10-03 NOTE — ASSESSMENT & PLAN NOTE
Coronary artery disease involving coronary bypass graft  S/P aortic valve replacement (bioprsthetic)  Aortic prosthetic valve regurgitation    Patient presented with wife after episode of diaphoresis and fatigue while vacuuming. Concern per patient's wife was that he was experiencing a heart attack. No chest pain. No recurrence of any symptoms while in ED.  - Hypertensive up to 197/84 -> 180/77. Vital signs otherwise remained stable.  - CBC with no leukocytosis, H/H stable. CMP Na 133, otherwise wnl.   - Troponin curve flat 0.015>0.026>0.015  - EKG on arrival shows no ischemic changes, with known LBBB  - CXR no acute intrathoracic process  - Continue all home mediations, including daily ASA, Lipitor, amlodipine, losartan   - Strict return precations

## 2022-10-03 NOTE — DISCHARGE SUMMARY
[facility] - Emergency Dept  [facility] Medicine  Discharge Summary      Patient Name: Chang Braswell  MRN: [id number]  Patient Class: OP- Observation  Admission Date: 10/1/2022  Hospital Length of Stay: 0 days  Discharge Date and Time: 10/2/2022  2:17 AM  Attending Physician: Sofiya att. providers found   Discharging Provider: Savita Rojas PA-C  Primary Care Provider: Gabino Manley MD  [facility] Medicine Team: [facility] MED E Savita Rojas PA-C    HPI:   Chang Braswell is a 87 y.o. male with PMH of CAD s/p CABG, s/p bioprosthetic AV replacement with central regurgitation, and TIA presenting to [facility] ED for fatigue and weakness. Patient's wife is physician and was concerned that patient was experiencing a cardiac event.  At 1:30 p.m. today patient felt dizzy and diaphoretic for about 15 minutes while vacuuming.  After resting and drinking some water his symptoms completely resolved, he was just feeling overall tired and weak.  He forgot to take his blood pressure medications this morning.  He took them around 2:30/3:00 p.m. and subsequently vomited them up.  Took them again and he was able to keep them down. He and his wife are mainly here tonight to make sure nothing else is going on because they are traveling next week. His blood pressure usually 140/80 at home, on presentation hypertensive 180/80s.  Denies chest pain, shortness of breath, abdominal pain, nausea, diarrhea, changes in vision.     In the ED, hypertensive 180s/80s, otherwise AFVSS. . Cr 0.8 (baseline). CBC wnl. trop 0.015>0.026. EKG with Sinus bradycardia with 1st degree A-V block. Left bundle branch block (known). CXR no acute intrathoracic process.       * No surgery found *      Hospital Course:   Chang Braswell was admitted to hospital medicine observation services for fatigue and hypertensive urgency. The patient and wife were originally concerned that the patient's fatigue could have been a sign of a heart attack. The  family was in agreement for hospital admission after second troponin resulted slightly elevated from the first reading. While still in the ED, a thrid troponin resulted with a flat curve (0.015>0.026>0.015). At this time, the family requested discharge home. The patient remained asymptomatic. No chest pain. EKG, CBC, CMP, troponins, and CXR from the ED were reviewed. No additional labs were collected. The patient's blood pressure in the ED was elevated to 197/82 max. Down to 180/77 by discharge without intervention. Oral antihypertensives were ordered but refused by the the patient/family. Discussed potential for end organ damage with SBP >180 or DBP >110 with the patient and wife. Patient and wife verbalized understanding. Discharged home from ED room. No new medications at discharge. Encouraged continuation of oral home antihypertensives, ASA and Lipitor.  Strict return precautions provided. All questions answered.        Goals of Care Treatment Preferences:  Code Status: Full Code      Consults:     * Hypertensive urgency  Essential hypertension  - Patient forgot to take home medications amlodipine and losartan this morning. Took them in the afternoon upon realizing he had forgotten them. Also did not take daily finasteride.   - Hypertensive up to 197/84 -> 180/77 prior to discharge. Refused last set of ordered vitals per RN.  - Amlodipine 10 mg (home medication) and hyzaar 50-12.5mg ordered but refused by patient/family  - Patient and wife feel comfortable continuing home oral blood pressure medications at discharge and monitoring patient's blood pressure at home  - Discussed potential for end-organ damage with BP readings >180 systolic >110 diastolic with patient and wife. They verbalized understanding.  - Strict return precautions     Fatigue  Coronary artery disease involving coronary bypass graft  S/P aortic valve replacement (bioprsthetic)  Aortic prosthetic valve regurgitation    Patient presented with  wife after episode of diaphoresis and fatigue while vacuuming. Concern per patient's wife was that he was experiencing a heart attack. No chest pain. No recurrence of any symptoms while in ED.  - Hypertensive up to 197/84 -> 180/77. Vital signs otherwise remained stable.  - CBC with no leukocytosis, H/H stable. CMP Na 133, otherwise wnl.   - Troponin curve flat 0.015>0.026>0.015  - EKG on arrival shows no ischemic changes, with known LBBB  - CXR no acute intrathoracic process  - Continue all home mediations, including daily ASA, Lipitor, amlodipine, losartan   - Strict return precations      Final Active Diagnoses:    Diagnosis Date Noted POA    PRINCIPAL PROBLEM:  Hypertensive urgency [I16.0] 10/02/2022 Yes    Fatigue [R53.83] 10/02/2022 Unknown    Essential hypertension [I10] 10/22/2018 Yes    S/P aortic valve replacement (bioprosthetic) [Z95.2] 07/19/2018 Not Applicable    Aortic prosthetic valve regurgitation [T82.897A] 07/19/2018 Yes    Coronary artery disease involving coronary bypass graft [I25.810] 06/27/2016 Yes      Problems Resolved During this Admission:       Discharged Condition: good    Disposition: Home or Self Care    Follow Up:    Patient Instructions:      Diet Adult Regular     Notify your health care provider if you experience any of the following:  persistent dizziness, light-headedness, or visual disturbances     Notify your health care provider if you experience any of the following:  increased confusion or weakness     Notify your health care provider if you experience any of the following:  severe persistent headache     Notify your health care provider if you experience any of the following:  severe uncontrolled pain     Notify your health care provider if you experience any of the following:  persistent nausea and vomiting or diarrhea     Activity as tolerated       Significant Diagnostic Studies: Labs:   CMP   Recent Labs   Lab 10/01/22  1845   *   K 4.5   CL 98   CO2 26       BUN 19   CREATININE 0.8   CALCIUM 10.2   PROT 7.8   ALBUMIN 4.0   BILITOT 0.6   ALKPHOS 82   AST 20   ALT 14   ANIONGAP 9   , CBC   Recent Labs   Lab 10/01/22  1845   WBC 5.13   HGB 16.0   HCT 48.8       and Troponin   Recent Labs   Lab 10/01/22  1845 10/01/22  2106 10/01/22  2340   TROPONINI 0.015 0.026 0.015     Radiology: X-Ray: CXR: X-Ray Chest 1 View (CXR): No results found for this visit on 10/01/22.  Cardiac Graphics: ECG: no ischemic changes, with known LBBB    Pending Diagnostic Studies:     Procedure Component Value Units Date/Time    EKG 12-lead [[id number]]     Order Status: Sent Lab Status: No result          Medications:  Reconciled Home Medications:      Medication List      ASK your doctor about these medications    amLODIPine 10 MG tablet  Commonly known as: NORVASC  Take 1 tablet (10 mg total) by mouth once daily.     ascorbic acid (vitamin C) 1000 MG tablet  Commonly known as: VITAMIN C  Take 1,000 mg by mouth 2 (two) times a day.     ASPIR-81 ORAL  Take 81 mg/kg/day by mouth once daily.     atorvastatin 80 MG tablet  Commonly known as: LIPITOR  Take 1 tablet (80 mg total) by mouth once daily.     co-enzyme Q-10 30 mg capsule  Take 3 capsules (90 mg total) by mouth 2 (two) times daily.     finasteride 5 mg tablet  Commonly known as: PROSCAR     * ketoconazole 2 % shampoo  Commonly known as: NIZORAL  Apply 1 g/oz topically once daily.     * ketoconazole 2 % shampoo  Commonly known as: NIZORAL  Wash scalp with medicated shampoo at least 2x/week. Let sit on scalp at least 5 minutes prior to rinsing     losartan-hydrochlorothiazide 50-12.5 mg 50-12.5 mg per tablet  Commonly known as: HYZAAR  Take 1 tablet by mouth once daily.     PROBIOTIC 10 billion cell Cap  Generic drug: Lactobacillus acidophilus  Take 100 tablets by mouth 2 (two) times a day.     tadalafiL 10 MG tablet  Commonly known as: CIALIS  Take 5 mg by mouth once daily.     triamcinolone acetonide 0.1% 0.1 %  ointment  Commonly known as: KENALOG  Apply to affected areas of body BID prn rash. Do not use on face, underarms, or groin.     turmeric 400 mg Cap  Take by mouth daily 2 hours after breakfast.         * This list has 2 medication(s) that are the same as other medications prescribed for you. Read the directions carefully, and ask your doctor or other care provider to review them with you.                Indwelling Lines/Drains at time of discharge:   Lines/Drains/Airways     None                 Time spent on the discharge of patient: 36 minutes         Savita Rojas PA-C  Department of Hospital Medicine  [facility] - Emergency Dept

## 2022-10-03 NOTE — ASSESSMENT & PLAN NOTE
Coronary artery disease involving coronary bypass graft  S/P aortic valve replacement (bioprsthetic)  Aortic prosthetic valve regurgitation    Patient presented with wife after episode of diaphoresis and fatigue while vacuuming. Concern per patient's wife was that he was experiencing a heart attack. No chest pain. No recurrence of any symptoms while in ED.  - Hypertensive up to 197/84 -> 180/77. Vital signs otherwise remained stable.  - CBC with no leukocytosis, H/H stable. CMP Na 133, otherwise wnl.   - Troponin curve flat 0.015>0.026>0.015  - EKG on arrival shows no ischemic changes, with known LBBB  - CXR no acute intrathoracic process  - Continue daily ASA, Lipitor

## 2022-10-03 NOTE — HOSPITAL COURSE
Chang Braswell was admitted to [facility] medicine observation services for fatigue and hypertensive urgency. The patient and wife were originally concerned that the patient's fatigue could have been a sign of a heart attack. The family was in agreement for hospital admission after second troponin resulted slightly elevated from the first reading. While still in the ED, a thrid troponin resulted with a flat curve (0.015>0.026>0.015). At this time, the family requested discharge home. The patient remained asymptomatic. No chest pain. EKG, CBC, CMP, troponins, and CXR from the ED were reviewed. No additional labs were collected. The patient's blood pressure in the ED was elevated to 197/82 max. Down to 180/77 by discharge without intervention. Oral antihypertensives were ordered but refused by the the patient/family. Discussed potential for end organ damage with SBP >180 or DBP >110 with the patient and wife. Patient and wife verbalized understanding. Discharged home from ED room. No new medications at discharge. Encouraged continuation of oral home antihypertensives, ASA and Lipitor.  Strict return precautions provided. All questions answered.

## 2022-10-03 NOTE — ASSESSMENT & PLAN NOTE
Essential hypertension  - Patient forgot to take home medications amlodipine and losartan this morning. Took them in the afternoon upon realizing he had forgotten them. Also did not take daily finasteride.   - Hypertensive up to 197/84 -> 180/77 prior to discharge. Refused last set of ordered vitals per RN.  - Amlodipine 10 mg (home medication) and hyzaar 50-12.5mg ordered but refused by patient/family  - Patient and wife feel comfortable continuing home oral blood pressure medications at discharge and monitoring patient's blood pressure at home  - Discussed potential for end-organ damage with BP readings >180 systolic >110 diastolic with patient and wife. They verbalized understanding.  - Strict return precautions

## 2022-10-03 NOTE — H&P
Daniel osbaldo - Emergency Dept  [facility] Medicine  History & Physical    Patient Name: Chang Braswell  MRN: [id number]  Patient Class: OP- Observation  Admission Date: 10/1/2022  Attending Physician: No att. providers found   Primary Care Provider: Gabino Manley MD         Patient information was obtained from patient, spouse/SO, past medical records and ER records.     Subjective:     Principal Problem:Hypertensive urgency    Chief Complaint:   Chief Complaint   Patient presents with    Fatigue        HPI: Chang Braswell is a 87 y.o. male with PMH of CAD s/p CABG, s/p bioprosthetic AV replacement with central regurgitation, and TIA presenting to [facility] ED for fatigue and weakness. Patient's wife is physician and was concerned that patient was experiencing a cardiac event.  At 1:30 p.m. today patient felt dizzy and diaphoretic for about 15 minutes while vacuuming.  After resting and drinking some water his symptoms completely resolved, he was just feeling overall tired and weak.  He forgot to take his blood pressure medications this morning.  He took them around 2:30/3:00 p.m. and subsequently vomited them up.  Took them again and he was able to keep them down. He and his wife are mainly here tonight to make sure nothing else is going on because they are traveling next week. His blood pressure usually 140/80 at home, on presentation hypertensive 180/80s.  Denies chest pain, shortness of breath, abdominal pain, nausea, diarrhea, changes in vision.     In the ED, hypertensive 180s/80s, otherwise AFVSS. . Cr 0.8 (baseline). CBC wnl. trop 0.015>0.026. EKG with Sinus bradycardia with 1st degree A-V block. Left bundle branch block (known). CXR no acute intrathoracic process.       Past Medical History:   Diagnosis Date    Coronary artery disease     Heart murmur     Hyperlipidemia     Hypertension     Mechanical heart valve present     Valvular regurgitation        Past Surgical History:   Procedure  Laterality Date    CARDIAC VALVE SURGERY      CORONARY ARTERY BYPASS GRAFT  2007    x 2       Review of patient's allergies indicates:  No Known Allergies    No current facility-administered medications on file prior to encounter.     Current Outpatient Medications on File Prior to Encounter   Medication Sig    amLODIPine (NORVASC) 10 MG tablet Take 1 tablet (10 mg total) by mouth once daily.    ascorbic acid, vitamin C, (VITAMIN C) 1000 MG tablet Take 1,000 mg by mouth 2 (two) times a day.    aspirin (ASPIR-81 ORAL) Take 81 mg/kg/day by mouth once daily.    atorvastatin (LIPITOR) 80 MG tablet Take 1 tablet (80 mg total) by mouth once daily.    co-enzyme Q-10 30 mg capsule Take 3 capsules (90 mg total) by mouth 2 (two) times daily.    finasteride (PROSCAR) 5 mg tablet     ketoconazole (NIZORAL) 2 % shampoo Apply 1 g/oz topically once daily.    ketoconazole (NIZORAL) 2 % shampoo Wash scalp with medicated shampoo at least 2x/week. Let sit on scalp at least 5 minutes prior to rinsing    Lactobacillus acidophilus (PROBIOTIC) 10 billion cell Cap Take 100 tablets by mouth 2 (two) times a day.    losartan-hydrochlorothiazide 50-12.5 mg (HYZAAR) 50-12.5 mg per tablet Take 1 tablet by mouth once daily.    tadalafiL (CIALIS) 10 MG tablet Take 5 mg by mouth once daily.     triamcinolone acetonide 0.1% (KENALOG) 0.1 % ointment Apply to affected areas of body BID prn rash. Do not use on face, underarms, or groin.    turmeric 400 mg Cap Take by mouth daily 2 hours after breakfast.     Family History    None       Tobacco Use    Smoking status: Former     Types: Cigarettes     Quit date: 1976     Years since quittin.7    Smokeless tobacco: Never   Substance and Sexual Activity    Alcohol use: Yes     Alcohol/week: 14.0 standard drinks     Types: 7 Glasses of wine, 7 Cans of beer per week     Comment: socially    Drug use: Never    Sexual activity: Yes     Partners: Female     Review of Systems    Constitutional:  Positive for diaphoresis and fatigue. Negative for chills and fever.   Respiratory:  Negative for cough and shortness of breath.    Cardiovascular:  Negative for chest pain and palpitations.   Gastrointestinal:  Positive for nausea and vomiting. Negative for abdominal pain, constipation and diarrhea.   Musculoskeletal:  Negative for myalgias.   Neurological:  Positive for weakness and light-headedness. Negative for headaches.   Objective:     Vital Signs (Most Recent):  Temp: 97.7 °F (36.5 °C) (10/01/22 2329)  Pulse: 60 (10/01/22 2329)  Resp: 18 (10/01/22 2329)  BP: (!) 180/77 (10/01/22 2329)  SpO2: 98 % (10/01/22 2329)   Vital Signs (24h Range):  Temp:  [97.7 °F (36.5 °C)] 97.7 °F (36.5 °C)  Pulse:  [60] 60  Resp:  [18] 18  SpO2:  [98 %] 98 %  BP: (180)/(77) 180/77     Weight: 81.6 kg (180 lb)  Body mass index is 25.83 kg/m².    Physical Exam  Vitals and nursing note reviewed.   Constitutional:       General: He is not in acute distress.     Appearance: Normal appearance. He is not toxic-appearing or diaphoretic.   HENT:      Head: Normocephalic and atraumatic.      Nose: Nose normal.      Mouth/Throat:      Mouth: Mucous membranes are moist.      Pharynx: Oropharynx is clear. No oropharyngeal exudate.   Eyes:      Extraocular Movements: Extraocular movements intact.      Conjunctiva/sclera: Conjunctivae normal.   Cardiovascular:      Rate and Rhythm: Normal rate and regular rhythm.      Heart sounds: Murmur heard.   Pulmonary:      Effort: Pulmonary effort is normal. No respiratory distress.      Breath sounds: Normal breath sounds. No wheezing, rhonchi or rales.   Abdominal:      General: Bowel sounds are normal. There is no distension.      Palpations: Abdomen is soft.      Tenderness: There is no abdominal tenderness.   Musculoskeletal:         General: Normal range of motion.      Right lower leg: No edema.      Left lower leg: No edema.   Lymphadenopathy:      Cervical: No cervical  adenopathy.   Skin:     General: Skin is warm and dry.   Neurological:      General: No focal deficit present.      Mental Status: He is alert and oriented to person, place, and time.      Motor: No weakness.   Psychiatric:         Mood and Affect: Mood normal.         Behavior: Behavior normal.         Thought Content: Thought content normal.         Judgment: Judgment normal.           Significant Labs: All pertinent labs within the past 24 hours have been reviewed.  CBC:   Recent Labs   Lab 10/01/22  1845   WBC 5.13   HGB 16.0   HCT 48.8        CMP:   Recent Labs   Lab 10/01/22  1845   *   K 4.5   CL 98   CO2 26      BUN 19   CREATININE 0.8   CALCIUM 10.2   PROT 7.8   ALBUMIN 4.0   BILITOT 0.6   ALKPHOS 82   AST 20   ALT 14   ANIONGAP 9     Troponin:   Recent Labs   Lab 10/01/22  1845 10/01/22  2106 10/01/22  2340   TROPONINI 0.015 0.026 0.015       Significant Imaging: I have reviewed all pertinent imaging results/findings within the past 24 hours.    X-Ray Chest AP Portable  Narrative: EXAMINATION:  XR CHEST AP PORTABLE    CLINICAL HISTORY:  Essential (primary) hypertension    TECHNIQUE:  Single frontal view of the chest was performed.    COMPARISON:  None    FINDINGS:  Monitoring EKG leads are present.  There are postoperative changes of median sternotomy.  The sternal wires are intact.    Portions of the lung apices are obscured by the patient's chin.  The trachea is unremarkable.  There are calcifications of the aortic knob.  The cardiomediastinal silhouette is within normal limits.  The hemidiaphragms are unremarkable.  There are no pleural effusions.  There is no evidence of a pneumothorax.  There is no evidence of pneumomediastinum.  No airspace opacity is present.  There are degenerative changes in the osseous structures.  Impression: No acute intrathoracic process.    Electronically signed by: Bj Lazaro MD  Date:    10/01/2022  Time:    23:35     Assessment/Plan:     *  Hypertensive urgency  Essential hypertension  - Patient forgot to take home medications amlodipine and losartan this morning. Took them in the afternoon upon realizing he had forgotten them. Also did not take daily finasteride.   - Hypertensive up to 197/84 -> 180/77 prior to discharge. Refused last set of ordered vitals per RN.  - Amlodipine 10 mg (home medication) and hyzaar 50-12.5mg ordered but refused by patient/family  - Patient and wife feel comfortable continuing home oral blood pressure medications at discharge and monitoring patient's blood pressure at home  - Discussed potential for end-organ damage with BP readings >180 systolic >110 diastolic with patient and wife. They verbalized understanding.  - Strict return precautions     Fatigue  Coronary artery disease involving coronary bypass graft  S/P aortic valve replacement (bioprsthetic)  Aortic prosthetic valve regurgitation    Patient presented with wife after episode of diaphoresis and fatigue while vacuuming. Concern per patient's wife was that he was experiencing a heart attack. No chest pain. No recurrence of any symptoms while in ED.  - Hypertensive up to 197/84 -> 180/77. Vital signs otherwise remained stable.  - CBC with no leukocytosis, H/H stable. CMP Na 133, otherwise wnl.   - Troponin curve flat 0.015>0.026>0.015  - EKG on arrival shows no ischemic changes, with known LBBB  - CXR no acute intrathoracic process  - Continue daily ASA, Lipitor      VTE Risk Mitigation (From admission, onward)    None             Savita Rojas PA-C  Department of Hospital Medicine   [facility] - Emergency Dept

## 2022-10-03 NOTE — H&P
[facility] Dept  Hospital Medicine  History & Physical    Patient Name: Chang Braswell  MRN: [id number]  Patient Class: OP- Observation  Admission Date: 10/1/2022  Attending Physician: No att. providers found   Primary Care Provider: Gabino Manley MD         Patient information was obtained from patient, spouse/SO, past medical records and ER records.     Subjective:     Principal Problem:Hypertensive urgency    Chief Complaint:   Chief Complaint   Patient presents with    Fatigue        HPI: Chang Braswell is a 87 y.o. male with PMH of CAD s/p CABG, s/p bioprosthetic AV replacement with central regurgitation, and TIA presenting to [facility] ED for fatigue and weakness. Patient's wife is physician and was concerned that patient was experiencing a cardiac event.  At 1:30 p.m. today patient felt dizzy and diaphoretic for about 15 minutes while vacuuming.  After resting and drinking some water his symptoms completely resolved, he was just feeling overall tired and weak.  He forgot to take his blood pressure medications this morning.  He took them around 2:30/3:00 p.m. and subsequently vomited them up.  Took them again and he was able to keep them down. He and his wife are mainly here tonight to make sure nothing else is going on because they are traveling next week. His blood pressure usually 140/80 at home, on presentation hypertensive 180/80s.  Denies chest pain, shortness of breath, abdominal pain, nausea, diarrhea, changes in vision.     In the ED, hypertensive 180s/80s, otherwise AFVSS. . Cr 0.8 (baseline). CBC wnl. trop 0.015>0.026. EKG with Sinus bradycardia with 1st degree A-V block. Left bundle branch block (known). CXR no acute intrathoracic process.       No new subjective & objective note has been filed under this hospital service since the last note was generated.    Assessment/Plan:     * Hypertensive urgency  Essential hypertension  - Patient forgot to take home medications  amlodipine and losartan this morning. Took them in the afternoon upon realizing he had forgotten them. Also did not take daily finasteride.   - Hypertensive up to 197/84 -> 180/77 prior to discharge. Refused last set of ordered vitals per RN.  - Amlodipine 10 mg (home medication) and hyzaar 50-12.5mg ordered but refused by patient/family  - Patient and wife feel comfortable continuing home oral blood pressure medications at discharge and monitoring patient's blood pressure at home  - Discussed potential for end-organ damage with BP readings >180 systolic >110 diastolic with patient and wife. They verbalized understanding.  - Strict return precautions     Fatigue  Coronary artery disease involving coronary bypass graft  S/P aortic valve replacement (bioprsthetic)  Aortic prosthetic valve regurgitation    Patient presented with wife after episode of diaphoresis and fatigue while vacuuming. Concern per patient's wife was that he was experiencing a heart attack. No chest pain. No recurrence of any symptoms while in ED.  - Hypertensive up to 197/84 -> 180/77. Vital signs otherwise remained stable.  - CBC with no leukocytosis, H/H stable. CMP Na 133, otherwise wnl.   - Troponin curve flat 0.015>0.026>0.015  - EKG on arrival shows no ischemic changes, with known LBBB  - CXR no acute intrathoracic process  - Continue daily ASA, Lipitor      VTE Risk Mitigation (From admission, onward)    None             MARY CARMEN ClarkeC  Department of Hospital Medicine   [facility] Dept

## 2022-10-03 NOTE — ASSESSMENT & PLAN NOTE
Essential hypertension  - Patient forgot to take home medications amlodipine and losartan this morning. Took them in the afternoon upon realizing he had forgotten them. Also did not take daily finasteride.   - Hypertensive up to 197/84 -> 180/77 prior to discharge. Refused last set of ordered vitals per RN.  - Amlodipine 10 mg (home medication) and hyzaar 50-12.5mg ordered but refused by patient/family  - Patient and wife feel comfortable continuing home oral blood pressure medications at discharge and monitoring patient's blood pressure at home  - Discussed potential for end-organ damage with BP readings >180 systolic >110 diastolic with patient and wife. They verbalized understanding.  - Strict return precautions    Statement Selected Statement Selected Statement Selected Statement Selected Statement Selected Statement Selected

## 2022-10-03 NOTE — SUBJECTIVE & OBJECTIVE
Past Medical History:   Diagnosis Date    Coronary artery disease     Heart murmur     Hyperlipidemia     Hypertension     Mechanical heart valve present     Valvular regurgitation        Past Surgical History:   Procedure Laterality Date    CARDIAC VALVE SURGERY      CORONARY ARTERY BYPASS GRAFT  2007    x 2       Review of patient's allergies indicates:  No Known Allergies    No current facility-administered medications on file prior to encounter.     Current Outpatient Medications on File Prior to Encounter   Medication Sig    amLODIPine (NORVASC) 10 MG tablet Take 1 tablet (10 mg total) by mouth once daily.    ascorbic acid, vitamin C, (VITAMIN C) 1000 MG tablet Take 1,000 mg by mouth 2 (two) times a day.    aspirin (ASPIR-81 ORAL) Take 81 mg/kg/day by mouth once daily.    atorvastatin (LIPITOR) 80 MG tablet Take 1 tablet (80 mg total) by mouth once daily.    co-enzyme Q-10 30 mg capsule Take 3 capsules (90 mg total) by mouth 2 (two) times daily.    finasteride (PROSCAR) 5 mg tablet     ketoconazole (NIZORAL) 2 % shampoo Apply 1 g/oz topically once daily.    ketoconazole (NIZORAL) 2 % shampoo Wash scalp with medicated shampoo at least 2x/week. Let sit on scalp at least 5 minutes prior to rinsing    Lactobacillus acidophilus (PROBIOTIC) 10 billion cell Cap Take 100 tablets by mouth 2 (two) times a day.    losartan-hydrochlorothiazide 50-12.5 mg (HYZAAR) 50-12.5 mg per tablet Take 1 tablet by mouth once daily.    tadalafiL (CIALIS) 10 MG tablet Take 5 mg by mouth once daily.     triamcinolone acetonide 0.1% (KENALOG) 0.1 % ointment Apply to affected areas of body BID prn rash. Do not use on face, underarms, or groin.    turmeric 400 mg Cap Take by mouth daily 2 hours after breakfast.     Family History    None       Tobacco Use    Smoking status: Former     Types: Cigarettes     Quit date: 1976     Years since quittin.7    Smokeless tobacco: Never   Substance and Sexual Activity    Alcohol use: Yes      Alcohol/week: 14.0 standard drinks     Types: 7 Glasses of wine, 7 Cans of beer per week     Comment: socially    Drug use: Never    Sexual activity: Yes     Partners: Female     Review of Systems   Constitutional:  Positive for diaphoresis and fatigue. Negative for chills and fever.   Respiratory:  Negative for cough and shortness of breath.    Cardiovascular:  Negative for chest pain and palpitations.   Gastrointestinal:  Positive for nausea and vomiting. Negative for abdominal pain, constipation and diarrhea.   Musculoskeletal:  Negative for myalgias.   Neurological:  Positive for weakness and light-headedness. Negative for headaches.   Objective:     Vital Signs (Most Recent):  Temp: 97.7 °F (36.5 °C) (10/01/22 2329)  Pulse: 60 (10/01/22 2329)  Resp: 18 (10/01/22 2329)  BP: (!) 180/77 (10/01/22 2329)  SpO2: 98 % (10/01/22 2329)   Vital Signs (24h Range):  Temp:  [97.7 °F (36.5 °C)] 97.7 °F (36.5 °C)  Pulse:  [60] 60  Resp:  [18] 18  SpO2:  [98 %] 98 %  BP: (180)/(77) 180/77     Weight: 81.6 kg (180 lb)  Body mass index is 25.83 kg/m².    Physical Exam  Vitals and nursing note reviewed.   Constitutional:       General: He is not in acute distress.     Appearance: Normal appearance. He is not toxic-appearing or diaphoretic.   HENT:      Head: Normocephalic and atraumatic.      Nose: Nose normal.      Mouth/Throat:      Mouth: Mucous membranes are moist.      Pharynx: Oropharynx is clear. No oropharyngeal exudate.   Eyes:      Extraocular Movements: Extraocular movements intact.      Conjunctiva/sclera: Conjunctivae normal.   Cardiovascular:      Rate and Rhythm: Normal rate and regular rhythm.      Heart sounds: Murmur heard.   Pulmonary:      Effort: Pulmonary effort is normal. No respiratory distress.      Breath sounds: Normal breath sounds. No wheezing, rhonchi or rales.   Abdominal:      General: Bowel sounds are normal. There is no distension.      Palpations: Abdomen is soft.      Tenderness: There is no  abdominal tenderness.   Musculoskeletal:         General: Normal range of motion.      Right lower leg: No edema.      Left lower leg: No edema.   Lymphadenopathy:      Cervical: No cervical adenopathy.   Skin:     General: Skin is warm and dry.   Neurological:      General: No focal deficit present.      Mental Status: He is alert and oriented to person, place, and time.      Motor: No weakness.   Psychiatric:         Mood and Affect: Mood normal.         Behavior: Behavior normal.         Thought Content: Thought content normal.         Judgment: Judgment normal.           Significant Labs: All pertinent labs within the past 24 hours have been reviewed.  CBC:   Recent Labs   Lab 10/01/22  1845   WBC 5.13   HGB 16.0   HCT 48.8        CMP:   Recent Labs   Lab 10/01/22  1845   *   K 4.5   CL 98   CO2 26      BUN 19   CREATININE 0.8   CALCIUM 10.2   PROT 7.8   ALBUMIN 4.0   BILITOT 0.6   ALKPHOS 82   AST 20   ALT 14   ANIONGAP 9     Troponin:   Recent Labs   Lab 10/01/22  1845 10/01/22  2106 10/01/22  2340   TROPONINI 0.015 0.026 0.015       Significant Imaging: I have reviewed all pertinent imaging results/findings within the past 24 hours.    X-Ray Chest AP Portable  Narrative: EXAMINATION:  XR CHEST AP PORTABLE    CLINICAL HISTORY:  Essential (primary) hypertension    TECHNIQUE:  Single frontal view of the chest was performed.    COMPARISON:  None    FINDINGS:  Monitoring EKG leads are present.  There are postoperative changes of median sternotomy.  The sternal wires are intact.    Portions of the lung apices are obscured by the patient's chin.  The trachea is unremarkable.  There are calcifications of the aortic knob.  The cardiomediastinal silhouette is within normal limits.  The hemidiaphragms are unremarkable.  There are no pleural effusions.  There is no evidence of a pneumothorax.  There is no evidence of pneumomediastinum.  No airspace opacity is present.  There are degenerative changes  in the osseous structures.  Impression: No acute intrathoracic process.    Electronically signed by: Bj Lazaro MD  Date:    10/01/2022  Time:    23:35

## 2022-10-28 ENCOUNTER — TELEPHONE (OUTPATIENT)
Dept: OPHTHALMOLOGY | Facility: CLINIC | Age: 87
End: 2022-10-28
Payer: MEDICARE

## 2022-10-28 NOTE — TELEPHONE ENCOUNTER
----- Message from Beverley Brock sent at 10/28/2022 11:27 AM CDT -----  Regarding: speak with office  Contact: Dr Xavier  Pt wife is calling to speak with office about referral for ....[phone]

## 2022-11-01 ENCOUNTER — OFFICE VISIT (OUTPATIENT)
Dept: UROLOGY | Facility: CLINIC | Age: 87
End: 2022-11-01
Payer: MEDICARE

## 2022-11-01 ENCOUNTER — OFFICE VISIT (OUTPATIENT)
Dept: CARDIOLOGY | Facility: CLINIC | Age: 87
End: 2022-11-01
Payer: MEDICARE

## 2022-11-01 ENCOUNTER — HOSPITAL ENCOUNTER (OUTPATIENT)
Dept: CARDIOLOGY | Facility: HOSPITAL | Age: 87
Discharge: HOME | End: 2022-11-01
Attending: INTERNAL MEDICINE
Payer: MEDICARE

## 2022-11-01 VITALS
HEART RATE: 65 BPM | HEIGHT: 70 IN | WEIGHT: 176 LBS | SYSTOLIC BLOOD PRESSURE: 114 MMHG | DIASTOLIC BLOOD PRESSURE: 60 MMHG | BODY MASS INDEX: 25.2 KG/M2

## 2022-11-01 VITALS
BODY MASS INDEX: 25.25 KG/M2 | DIASTOLIC BLOOD PRESSURE: 63 MMHG | HEART RATE: 60 BPM | WEIGHT: 176.38 LBS | SYSTOLIC BLOOD PRESSURE: 127 MMHG | HEIGHT: 70 IN

## 2022-11-01 VITALS
WEIGHT: 176.56 LBS | BODY MASS INDEX: 25.28 KG/M2 | HEIGHT: 70 IN | DIASTOLIC BLOOD PRESSURE: 59 MMHG | SYSTOLIC BLOOD PRESSURE: 114 MMHG | HEART RATE: 66 BPM | OXYGEN SATURATION: 94 %

## 2022-11-01 DIAGNOSIS — Z95.1 S/P CABG (CORONARY ARTERY BYPASS GRAFT): Primary | ICD-10-CM

## 2022-11-01 DIAGNOSIS — I50.41 ACUTE COMBINED SYSTOLIC AND DIASTOLIC HEART FAILURE: ICD-10-CM

## 2022-11-01 DIAGNOSIS — I50.20 HEART FAILURE WITH REDUCED EJECTION FRACTION: ICD-10-CM

## 2022-11-01 DIAGNOSIS — N13.8 BPH WITH URINARY OBSTRUCTION: Primary | ICD-10-CM

## 2022-11-01 DIAGNOSIS — Z95.1 S/P CABG (CORONARY ARTERY BYPASS GRAFT): ICD-10-CM

## 2022-11-01 DIAGNOSIS — E78.2 MIXED HYPERLIPIDEMIA: ICD-10-CM

## 2022-11-01 DIAGNOSIS — N40.1 BPH WITH URINARY OBSTRUCTION: Primary | ICD-10-CM

## 2022-11-01 DIAGNOSIS — I10 ESSENTIAL HYPERTENSION: ICD-10-CM

## 2022-11-01 LAB
ASCENDING AORTA: 3.42 CM
AV INDEX (PROSTH): 0.18
AV MEAN GRADIENT: 24 MMHG
AV PEAK GRADIENT: 39 MMHG
AV VALVE AREA: 1.09 CM2
AV VELOCITY RATIO: 0.19
BSA FOR ECHO PROCEDURE: 1.99 M2
CV ECHO LV RWT: 0.34 CM
DOP CALC AO PEAK VEL: 3.12 M/S
DOP CALC AO VTI: 71.49 CM
DOP CALC LVOT AREA: 6.1 CM2
DOP CALC LVOT DIAMETER: 2.79 CM
DOP CALC LVOT PEAK VEL: 0.6 M/S
DOP CALC LVOT STROKE VOLUME: 77.66 CM3
DOP CALC MV VTI: 27.23 CM
DOP CALCLVOT PEAK VEL VTI: 12.71 CM
E WAVE DECELERATION TIME: 512.1 MSEC
E/A RATIO: 0.52
E/E' RATIO: 11.56 M/S
ECHO LV POSTERIOR WALL: 0.99 CM (ref 0.6–1.1)
EJECTION FRACTION: 35 %
FRACTIONAL SHORTENING: 20 % (ref 28–44)
INTERVENTRICULAR SEPTUM: 1.03 CM (ref 0.6–1.1)
IVRT: 105.61 MSEC
LA MAJOR: 6.11 CM
LA MINOR: 5.84 CM
LA WIDTH: 5.27 CM
LEFT ATRIUM SIZE: 4.34 CM
LEFT ATRIUM VOLUME INDEX MOD: 79.8 ML/M2
LEFT ATRIUM VOLUME INDEX: 58.6 ML/M2
LEFT ATRIUM VOLUME MOD: 158.08 CM3
LEFT ATRIUM VOLUME: 116.1 CM3
LEFT INTERNAL DIMENSION IN SYSTOLE: 4.67 CM (ref 2.1–4)
LEFT VENTRICLE DIASTOLIC VOLUME INDEX: 84.33 ML/M2
LEFT VENTRICLE DIASTOLIC VOLUME: 166.98 ML
LEFT VENTRICLE MASS INDEX: 120 G/M2
LEFT VENTRICLE SYSTOLIC VOLUME INDEX: 51 ML/M2
LEFT VENTRICLE SYSTOLIC VOLUME: 100.96 ML
LEFT VENTRICULAR INTERNAL DIMENSION IN DIASTOLE: 5.81 CM (ref 3.5–6)
LEFT VENTRICULAR MASS: 236.83 G
LV LATERAL E/E' RATIO: 10.4 M/S
LV SEPTAL E/E' RATIO: 13 M/S
MV MEAN GRADIENT: 1 MMHG
MV PEAK A VEL: 1 M/S
MV PEAK E VEL: 0.52 M/S
MV PEAK GRADIENT: 5 MMHG
MV STENOSIS PRESSURE HALF TIME: 64.84 MS
MV VALVE AREA BY CONTINUITY EQUATION: 2.85 CM2
MV VALVE AREA P 1/2 METHOD: 3.39 CM2
PISA TR MAX VEL: 2.56 M/S
PULM VEIN S/D RATIO: 1.46
PV PEAK D VEL: 0.39 M/S
PV PEAK S VEL: 0.57 M/S
QEF: 38 %
RA MAJOR: 5.23 CM
RA PRESSURE: 3 MMHG
RA WIDTH: 4.63 CM
RIGHT VENTRICULAR END-DIASTOLIC DIMENSION: 5.34 CM
RV TISSUE DOPPLER FREE WALL SYSTOLIC VELOCITY 1 (APICAL 4 CHAMBER VIEW): 7.51 CM/S
SINUS: 4.24 CM
STJ: 3.5 CM
TDI LATERAL: 0.05 M/S
TDI SEPTAL: 0.04 M/S
TDI: 0.05 M/S
TR MAX PG: 26 MMHG
TRICUSPID ANNULAR PLANE SYSTOLIC EXCURSION: 1.8 CM
TV REST PULMONARY ARTERY PRESSURE: 29 MMHG

## 2022-11-01 PROCEDURE — 1126F AMNT PAIN NOTED NONE PRSNT: CPT | Mod: CPTII,GC,S$GLB, | Performed by: INTERNAL MEDICINE

## 2022-11-01 PROCEDURE — 1159F PR MEDICATION LIST DOCUMENTED IN MEDICAL RECORD: ICD-10-PCS | Mod: CPTII,S$GLB,, | Performed by: UROLOGY

## 2022-11-01 PROCEDURE — 99214 OFFICE O/P EST MOD 30 MIN: CPT | Mod: GC,S$GLB,, | Performed by: INTERNAL MEDICINE

## 2022-11-01 PROCEDURE — 99204 PR OFFICE/OUTPT VISIT, NEW, LEVL IV, 45-59 MIN: ICD-10-PCS | Mod: S$GLB,,, | Performed by: UROLOGY

## 2022-11-01 PROCEDURE — 1126F AMNT PAIN NOTED NONE PRSNT: CPT | Mod: CPTII,S$GLB,, | Performed by: UROLOGY

## 2022-11-01 PROCEDURE — 1101F PT FALLS ASSESS-DOCD LE1/YR: CPT | Mod: CPTII,S$GLB,, | Performed by: UROLOGY

## 2022-11-01 PROCEDURE — 1159F MED LIST DOCD IN RCRD: CPT | Mod: CPTII,S$GLB,, | Performed by: UROLOGY

## 2022-11-01 PROCEDURE — 1126F PR PAIN SEVERITY QUANTIFIED, NO PAIN PRESENT: ICD-10-PCS | Mod: CPTII,GC,S$GLB, | Performed by: INTERNAL MEDICINE

## 2022-11-01 PROCEDURE — 1101F PR PT FALLS ASSESS DOC 0-1 FALLS W/OUT INJ PAST YR: ICD-10-PCS | Mod: CPTII,S$GLB,, | Performed by: UROLOGY

## 2022-11-01 PROCEDURE — 99204 OFFICE O/P NEW MOD 45 MIN: CPT | Mod: S$GLB,,, | Performed by: UROLOGY

## 2022-11-01 PROCEDURE — 93306 TTE W/DOPPLER COMPLETE: CPT | Mod: 26,,, | Performed by: INTERNAL MEDICINE

## 2022-11-01 PROCEDURE — 3288F FALL RISK ASSESSMENT DOCD: CPT | Mod: CPTII,GC,S$GLB, | Performed by: INTERNAL MEDICINE

## 2022-11-01 PROCEDURE — 93306 ECHO (CUPID ONLY): ICD-10-PCS | Mod: 26,,, | Performed by: INTERNAL MEDICINE

## 2022-11-01 PROCEDURE — 99999 PR PBB SHADOW E&M-EST. PATIENT-LVL V: ICD-10-PCS | Mod: PBBFAC,GC,, | Performed by: INTERNAL MEDICINE

## 2022-11-01 PROCEDURE — 1101F PR PT FALLS ASSESS DOC 0-1 FALLS W/OUT INJ PAST YR: ICD-10-PCS | Mod: CPTII,GC,S$GLB, | Performed by: INTERNAL MEDICINE

## 2022-11-01 PROCEDURE — 99999 PR PBB SHADOW E&M-EST. PATIENT-LVL IV: CPT | Mod: PBBFAC,,, | Performed by: UROLOGY

## 2022-11-01 PROCEDURE — 3288F PR FALLS RISK ASSESSMENT DOCUMENTED: ICD-10-PCS | Mod: CPTII,GC,S$GLB, | Performed by: INTERNAL MEDICINE

## 2022-11-01 PROCEDURE — 99999 PR PBB SHADOW E&M-EST. PATIENT-LVL IV: ICD-10-PCS | Mod: PBBFAC,,, | Performed by: UROLOGY

## 2022-11-01 PROCEDURE — 3288F FALL RISK ASSESSMENT DOCD: CPT | Mod: CPTII,S$GLB,, | Performed by: UROLOGY

## 2022-11-01 PROCEDURE — 1159F MED LIST DOCD IN RCRD: CPT | Mod: CPTII,GC,S$GLB, | Performed by: INTERNAL MEDICINE

## 2022-11-01 PROCEDURE — 99999 PR PBB SHADOW E&M-EST. PATIENT-LVL V: CPT | Mod: PBBFAC,GC,, | Performed by: INTERNAL MEDICINE

## 2022-11-01 PROCEDURE — 1101F PT FALLS ASSESS-DOCD LE1/YR: CPT | Mod: CPTII,GC,S$GLB, | Performed by: INTERNAL MEDICINE

## 2022-11-01 PROCEDURE — 93306 TTE W/DOPPLER COMPLETE: CPT

## 2022-11-01 PROCEDURE — 99214 PR OFFICE/OUTPT VISIT, EST, LEVL IV, 30-39 MIN: ICD-10-PCS | Mod: GC,S$GLB,, | Performed by: INTERNAL MEDICINE

## 2022-11-01 PROCEDURE — 1159F PR MEDICATION LIST DOCUMENTED IN MEDICAL RECORD: ICD-10-PCS | Mod: CPTII,GC,S$GLB, | Performed by: INTERNAL MEDICINE

## 2022-11-01 PROCEDURE — 3288F PR FALLS RISK ASSESSMENT DOCUMENTED: ICD-10-PCS | Mod: CPTII,S$GLB,, | Performed by: UROLOGY

## 2022-11-01 PROCEDURE — 1126F PR PAIN SEVERITY QUANTIFIED, NO PAIN PRESENT: ICD-10-PCS | Mod: CPTII,S$GLB,, | Performed by: UROLOGY

## 2022-11-01 RX ORDER — AMOXICILLIN 500 MG/1
CAPSULE ORAL
COMMUNITY

## 2022-11-01 RX ORDER — COVID-19 MOLECULAR TEST ASSAY
KIT MISCELLANEOUS
COMMUNITY
Start: 2022-05-23

## 2022-11-01 RX ORDER — LOSARTAN POTASSIUM 50 MG/1
50 TABLET ORAL DAILY
COMMUNITY
Start: 2022-09-18

## 2022-11-01 RX ORDER — OMEPRAZOLE 40 MG/1
40 CAPSULE, DELAYED RELEASE ORAL
COMMUNITY

## 2022-11-01 NOTE — PROGRESS NOTES
PCP - Gabino Manley MD  Referring Physician:     Subjective:   Patient ID:  Chang Braswell is a 87 y.o. y.o. male with a history of CAD s/p CABG, s/p bioprosthetic AV replacement, and TIA who presents to the clinic for hospital follow-up.  Patient presents to the ER on 10/01/2022 with complaints of fatigue, weakness, and dizziness.  Patient's wife is a retired family physician.  Patient was vacuuming at a time symptoms started and called his wife few minutes after the onset.  His wife came home and brought him to the ED.  In the ED patient was hypertensive with a blood pressure of 180/80.  Per patient's wife who was present with him today, he forgot to take his home medications at the time.  His symptoms resolved by the time he made it to the ER.  Patient his wife were unhappy with the wait time of the ER and decided to go home and take his oral medications.  Workup for ACS was grossly unremarkable.  EKG notable for left bundle branch block with significant first-degree block.  CMP notable for mild hyponatremia otherwise unremarkable.  CBC also unremarkable.  Chest x-ray showing no acute intrathoracic process.  Since his discharge, patient has been doing well he denies any chest discomfort chest pain, pressure, tightness, or dyspnea at rest on exertion.  He has been taking his medications as prescribed.  He also denies any orthopnea, PND, or lower extremity edema.    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    Unspecified symptoms and signs involving cognitive functions and awareness 10/22/2018    Essential hypertension  10/22/2018    MCI (mild cognitive impairment) 2019    Fatigue 10/02/2022    Hypertensive urgency 10/02/2022     Resolved Ambulatory Problems     Diagnosis Date Noted    Preventative health care 06/15/2016     Past Medical History:   Diagnosis Date    Coronary artery disease     Heart murmur     Hyperlipidemia     Hypertension     Mechanical heart valve present     Valvular regurgitation        Social History     Tobacco Use    Smoking status: Former     Types: Cigarettes     Quit date: 1976     Years since quittin.8    Smokeless tobacco: Never   Substance Use Topics    Alcohol use: Yes     Alcohol/week: 14.0 standard drinks     Types: 7 Glasses of wine, 7 Cans of beer per week     Comment: socially     Family History   Problem Relation Age of Onset    Heart attack Neg Hx     Heart disease Neg Hx     Hyperlipidemia Neg Hx     Hypertension Neg Hx     Melanoma Neg Hx        Meds:   Review of patient's allergies indicates:  No Known Allergies    Current Outpatient Medications:     amLODIPine (NORVASC) 10 MG tablet, Take 1 tablet (10 mg total) by mouth once daily., Disp: 90 tablet, Rfl: 3    amoxicillin (AMOXIL) 500 MG capsule, , Disp: , Rfl:     ascorbic acid, vitamin C, (VITAMIN C) 1000 MG tablet, Take 1,000 mg by mouth 2 (two) times a day., Disp: , Rfl:     aspirin (ASPIR-81 ORAL), Take 81 mg/kg/day by mouth once daily., Disp: , Rfl:     aspirin 81 mg Cap, Take 81 mg/kg/day by mouth., Disp: , Rfl:     co-enzyme Q-10 30 mg capsule, Take 3 capsules (90 mg total) by mouth 2 (two) times daily., Disp: , Rfl:     finasteride (PROSCAR) 5 mg tablet, , Disp: , Rfl:     ID NOW COVID-19 TEST KIT Kit, TEST AS DIRECTED TODAY, Disp: , Rfl:     ketoconazole (NIZORAL) 2 % shampoo, Apply 1 g/oz topically once daily., Disp: , Rfl:     ketoconazole (NIZORAL) 2 % shampoo, Wash scalp with medicated shampoo at least 2x/week. Let sit on scalp at least 5 minutes prior to rinsing, Disp: 240 mL, Rfl: 5    Lactobacillus  "acidophilus (PROBIOTIC) 10 billion cell Cap, Take 100 tablets by mouth 2 (two) times a day., Disp: , Rfl:     losartan (COZAAR) 50 MG tablet, Take 50 mg by mouth once daily., Disp: , Rfl:     omeprazole (PRILOSEC) 40 MG capsule, Take 40 mg by mouth., Disp: , Rfl:     tadalafiL (CIALIS) 10 MG tablet, Take 5 mg by mouth once daily. , Disp: , Rfl:     triamcinolone acetonide 0.1% (KENALOG) 0.1 % ointment, Apply to affected areas of body BID prn rash. Do not use on face, underarms, or groin., Disp: 454 g, Rfl: 1    turmeric 400 mg Cap, Take by mouth daily 2 hours after breakfast., Disp: , Rfl:     atorvastatin (LIPITOR) 80 MG tablet, Take 1 tablet (80 mg total) by mouth once daily., Disp: 90 tablet, Rfl: 3    Review of Systems   Constitutional:  Negative for fever.   Eyes:  Negative for blurred vision and double vision.   Respiratory:  Negative for shortness of breath.    Cardiovascular:  Negative for chest pain, palpitations, orthopnea, claudication, leg swelling and PND.   Gastrointestinal:  Negative for abdominal pain, nausea and vomiting.   Neurological:  Negative for loss of consciousness and weakness.     Objective:   BP (!) 114/59 (BP Location: Left arm, Patient Position: Sitting, BP Method: Medium (Automatic))   Pulse 66   Ht 5' 10" (1.778 m)   Wt 80.1 kg (176 lb 9.4 oz)   SpO2 (!) 94%   BMI 25.34 kg/m²   Physical Exam  Constitutional:       General: He is not in acute distress.     Appearance: He is not diaphoretic.   HENT:      Head: Normocephalic and atraumatic.      Right Ear: External ear normal.      Left Ear: External ear normal.   Neck:      Thyroid: No thyromegaly.      Trachea: No tracheal deviation.   Cardiovascular:      Rate and Rhythm: Normal rate.      Pulses:           Carotid pulses are 2+ on the right side and 2+ on the left side.       Radial pulses are 2+ on the right side and 2+ on the left side.      Heart sounds: Normal heart sounds. No murmur heard.    No friction rub. No gallop. "   Pulmonary:      Effort: No respiratory distress.      Breath sounds: No wheezing.   Abdominal:      General: There is no distension.      Tenderness: There is no abdominal tenderness. There is no rebound.   Musculoskeletal:         General: No tenderness or deformity. Normal range of motion.   Lymphadenopathy:      Cervical: No cervical adenopathy.   Skin:     Findings: No erythema or rash.   Neurological:      Mental Status: He is alert and oriented to person, place, and time.       Labs:     Lab Results   Component Value Date     (L) 10/01/2022    K 4.5 10/01/2022    CL 98 10/01/2022    CO2 26 10/01/2022    BUN 19 10/01/2022    CREATININE 0.8 10/01/2022    ANIONGAP 9 10/01/2022     No results found for: HGBA1C  No results found for: BNP, BNPTRIAGEBLO    Lab Results   Component Value Date    WBC 5.13 10/01/2022    HGB 16.0 10/01/2022    HCT 48.8 10/01/2022     10/01/2022    GRAN 3.6 10/01/2022    GRAN 70.6 10/01/2022     Lab Results   Component Value Date    CHOL 223 (H) 06/15/2016    HDL 61 06/15/2016    LDLCALC 134.8 06/15/2016    TRIG 136 06/15/2016       Lab Results   Component Value Date     (L) 10/01/2022    K 4.5 10/01/2022    CL 98 10/01/2022    CO2 26 10/01/2022    BUN 19 10/01/2022    CREATININE 0.8 10/01/2022    ANIONGAP 9 10/01/2022     No results found for: HGBA1C  No results found for: BNP, BNPTRIAGEBLO Lab Results   Component Value Date    WBC 5.13 10/01/2022    HGB 16.0 10/01/2022    HCT 48.8 10/01/2022     10/01/2022    GRAN 3.6 10/01/2022    GRAN 70.6 10/01/2022     Lab Results   Component Value Date    CHOL 223 (H) 06/15/2016    HDL 61 06/15/2016    LDLCALC 134.8 06/15/2016    TRIG 136 06/15/2016                Cardiovascular Imaging:     Echo 11/1/22:   The left ventricle is mildly enlarged with eccentric hypertrophy and moderately decreased systolic function. The estimated ejection fraction is 35%.  The quantitatively derived ejection fraction is 38%.  Moderate right  ventricular enlargement with normal right ventricular systolic function.  Grade I left ventricular diastolic dysfunction.  Biatrial enlargement.  There is moderate aortic valve stenosis.  Aortic valve area is 1.09 cm2; peak velocity is 3.12 m/s; mean gradient is 24 mmHg.  The estimated PA systolic pressure is 29 mmHg.  Normal central venous pressure (3 mmHg).  The sinuses of Valsalva is mildly dilated.     Echo 07/24/2018:  CONCLUSIONS     1 - Low normal left ventricular systolic function (EF 50-55%).     2 - No wall motion abnormalities.     3 - Biatrial enlargement.     4 - Indeterminate LV diastolic function.     5 - Right ventricular enlargement with low normal to mildly depressed systolic function.     6 - The estimated PA systolic pressure is 25 mmHg.     7 - Aortic valve prosthesis, LIZETTE = 1.07 cm2, AVAi = 0.54 cm2/m2, peak velocity = 3.18 m/s, mean gradient = 24 mmHg.     8 - Mild to moderate valvular aortic regurgitation.     9 - Trivial mitral regurgitation.     10 - Trivial tricuspid regurgitation.     11 - Consider degenerating aortic bioiprosthesis.  Clinical correlation.         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  Will need medication optimization in addition to stress test/angiogram.  Discussed symptoms and plan extensively with patient's primary cardiologist Dr. Tobin and patient's wife who is his primary caregiver.  Patient's echo was reviewed by echo team who agreed patient's EF is more so 40%.  Per patient's wife patient is a poor historian and has actually unclear if patient is symptomatic i.e. chest discomfort such as pain pressure, tightness or dyspnea on exertion.  Patient is active however prior to his bypass patient was not experiencing any chest discomfort.  Per his wife patient's bypass was done because of findings on his angiogram in 2005.   -will therefore will out   ischemia as a change in his EF.  Given that he has moderate aortic stenosis of his bioprosthetic valve, this is unlikely to be the etiology of his drop in EF.  -will continue current medication regimen for now   -I did provide instructions to contact me or Dr. Tobin for any change in his symptoms/clinical status or come to the ER.  -will order a PET stress test    Left bundle branch block  -will optimize on medical therapy and check PET stress test    Coronary disease status post CABG  -see plan above  -PET stress test as stated above    Status post bioprosthetic aortic valve with Moderate stenosis:  -will get repeat echo in 3-4 months to follow-up on your EF however will order repeat echo for facial follow-up in 1 year    Hypertension  -continue current medication regime    Hyperlipidemia  -continue statin      Signed:  Mack Hale M.D.  Page # [phone]  Cardiovascular Fellow  [facility]

## 2022-11-01 NOTE — PROGRESS NOTES
Subjective:       Patient ID: Chang Braswell is a 87 y.o. male.    Chief Complaint: new patient (New patient to urology , wife state some prostate issue , would like to discuss laser surgery , history of bladder cancer .)    HPI  patient is here with lower urinary tract symptoms and a history of bladder tumor.  He status post TURBT in .  He has a slow stream.  His postvoid residual is minimal.  He has a prosthetic aortic valve.  He has been on alpha blockers and is continued to struggle.  Urinalysis is negative.  He is only on aspirin    Past Medical History:   Diagnosis Date    Coronary artery disease     Heart murmur     Hyperlipidemia     Hypertension     Mechanical heart valve present     Valvular regurgitation        Past Surgical History:   Procedure Laterality Date    CARDIAC VALVE SURGERY      CORONARY ARTERY BYPASS GRAFT  2007    x 2       Family History   Problem Relation Age of Onset    Heart attack Neg Hx     Heart disease Neg Hx     Hyperlipidemia Neg Hx     Hypertension Neg Hx     Melanoma Neg Hx        Social History     Socioeconomic History    Marital status:    Tobacco Use    Smoking status: Former     Types: Cigarettes     Quit date: 1976     Years since quittin.8    Smokeless tobacco: Never   Substance and Sexual Activity    Alcohol use: Yes     Alcohol/week: 14.0 standard drinks     Types: 7 Glasses of wine, 7 Cans of beer per week     Comment: socially    Drug use: Never    Sexual activity: Yes     Partners: Female       Allergies:  Patient has no known allergies.    Medications:    Current Outpatient Medications:     amLODIPine (NORVASC) 10 MG tablet, Take 1 tablet (10 mg total) by mouth once daily., Disp: 90 tablet, Rfl: 3    ascorbic acid, vitamin C, (VITAMIN C) 1000 MG tablet, Take 1,000 mg by mouth 2 (two) times a day., Disp: , Rfl:     aspirin (ASPIR-81 ORAL), Take 81 mg/kg/day by mouth once daily., Disp: , Rfl:     co-enzyme Q-10 30 mg capsule, Take 3  capsules (90 mg total) by mouth 2 (two) times daily., Disp: , Rfl:     finasteride (PROSCAR) 5 mg tablet, , Disp: , Rfl:     ketoconazole (NIZORAL) 2 % shampoo, Apply 1 g/oz topically once daily., Disp: , Rfl:     ketoconazole (NIZORAL) 2 % shampoo, Wash scalp with medicated shampoo at least 2x/week. Let sit on scalp at least 5 minutes prior to rinsing, Disp: 240 mL, Rfl: 5    Lactobacillus acidophilus (PROBIOTIC) 10 billion cell Cap, Take 100 tablets by mouth 2 (two) times a day., Disp: , Rfl:     turmeric 400 mg Cap, Take by mouth daily 2 hours after breakfast., Disp: , Rfl:     amoxicillin (AMOXIL) 500 MG capsule, , Disp: , Rfl:     aspirin 81 mg Cap, Take 81 mg/kg/day by mouth., Disp: , Rfl:     atorvastatin (LIPITOR) 80 MG tablet, Take 1 tablet (80 mg total) by mouth once daily., Disp: 90 tablet, Rfl: 3    ID NOW COVID-19 TEST KIT Kit, TEST AS DIRECTED TODAY, Disp: , Rfl:     losartan (COZAAR) 50 MG tablet, Take 50 mg by mouth once daily., Disp: , Rfl:     losartan-hydrochlorothiazide 50-12.5 mg (HYZAAR) 50-12.5 mg per tablet, Take 1 tablet by mouth once daily., Disp: 90 tablet, Rfl: 3    omeprazole (PRILOSEC) 40 MG capsule, Take 40 mg by mouth., Disp: , Rfl:     tadalafiL (CIALIS) 10 MG tablet, Take 5 mg by mouth once daily. , Disp: , Rfl:     triamcinolone acetonide 0.1% (KENALOG) 0.1 % ointment, Apply to affected areas of body BID prn rash. Do not use on face, underarms, or groin. (Patient not taking: Reported on 11/1/2022), Disp: 454 g, Rfl: 1    Review of Systems   Constitutional:  Negative for activity change, appetite change, chills, diaphoresis, fatigue, fever and unexpected weight change.   HENT:  Negative for congestion, dental problem, hearing loss, mouth sores, postnasal drip, rhinorrhea, sinus pressure and trouble swallowing.    Eyes:  Negative for pain, discharge and itching.   Respiratory:  Negative for apnea, cough, choking, chest tightness, shortness of breath and wheezing.    Cardiovascular:   Negative for chest pain, palpitations and leg swelling.   Gastrointestinal:  Negative for abdominal distention, abdominal pain, anal bleeding, blood in stool, constipation, diarrhea, nausea, rectal pain and vomiting.   Endocrine: Negative for polydipsia and polyuria.   Genitourinary:  Positive for decreased urine volume and difficulty urinating. Negative for dysuria, enuresis, flank pain, frequency, genital sores, hematuria, penile discharge, penile pain, penile swelling, scrotal swelling, testicular pain and urgency.   Musculoskeletal:  Negative for arthralgias, back pain and myalgias.   Skin:  Negative for color change, rash and wound.   Neurological:  Negative for dizziness, syncope, speech difficulty, light-headedness and headaches.   Hematological:  Negative for adenopathy. Does not bruise/bleed easily.   Psychiatric/Behavioral:  Negative for behavioral problems, confusion, hallucinations and sleep disturbance.      Objective:      Physical Exam  Constitutional:       Appearance: He is well-developed.   HENT:      Head: Normocephalic.   Cardiovascular:      Rate and Rhythm: Normal rate.   Pulmonary:      Effort: Pulmonary effort is normal.   Abdominal:      Palpations: Abdomen is soft.   Genitourinary:     Prostate: Normal.      Comments: 35 g benign  Skin:     General: Skin is warm.   Neurological:      Mental Status: He is alert.       Assessment:       1. BPH with urinary obstruction          Plan:       Chang was seen today for new patient.    Diagnoses and all orders for this visit:    BPH with urinary obstruction  -     US Retroperitoneal Complete; Future  -     Cystoscopy; Future  -     Transrectal ultrasound; Future

## 2022-11-02 ENCOUNTER — PATIENT MESSAGE (OUTPATIENT)
Dept: CARDIOLOGY | Facility: CLINIC | Age: 87
End: 2022-11-02
Payer: MEDICARE

## 2022-11-02 PROBLEM — E78.2 MIXED HYPERLIPIDEMIA: Status: ACTIVE | Noted: 2022-11-02

## 2022-11-09 ENCOUNTER — PATIENT MESSAGE (OUTPATIENT)
Dept: CARDIOLOGY | Facility: CLINIC | Age: 87
End: 2022-11-09
Payer: MEDICARE

## 2022-11-09 ENCOUNTER — HOSPITAL ENCOUNTER (OUTPATIENT)
Dept: RADIOLOGY | Facility: OTHER | Age: 87
Discharge: HOME | End: 2022-11-09
Attending: UROLOGY
Payer: MEDICARE

## 2022-11-09 DIAGNOSIS — N13.8 BPH WITH URINARY OBSTRUCTION: ICD-10-CM

## 2022-11-09 DIAGNOSIS — N40.1 BPH WITH URINARY OBSTRUCTION: ICD-10-CM

## 2022-11-09 PROCEDURE — 76770 US RETROPERITONEAL COMPLETE: ICD-10-PCS | Mod: 26,,, | Performed by: RADIOLOGY

## 2022-11-09 PROCEDURE — 76770 US EXAM ABDO BACK WALL COMP: CPT | Mod: 26,,, | Performed by: RADIOLOGY

## 2022-11-09 PROCEDURE — 76770 US EXAM ABDO BACK WALL COMP: CPT | Mod: TC

## 2022-11-10 DIAGNOSIS — I44.7 LEFT BUNDLE BRANCH BLOCK: Primary | ICD-10-CM

## 2022-11-10 DIAGNOSIS — N28.89 RENAL MASS: ICD-10-CM

## 2022-11-11 ENCOUNTER — TELEPHONE (OUTPATIENT)
Dept: UROLOGY | Facility: CLINIC | Age: 87
End: 2022-11-11
Payer: MEDICARE

## 2022-11-11 NOTE — TELEPHONE ENCOUNTER
----- Message from Lalo Diamond Jr., MD sent at 11/11/2022  6:54 AM CST -----  Left aml  Repeat renal us in 6 mos

## 2022-11-11 NOTE — TELEPHONE ENCOUNTER
Call placed to patient. Name and date of birth verified. Patient informed of the following:    Repeat renal us in 6 mos  -Dr. Diamond    Patient verbalized understanding.  6 month recall placed.

## 2022-11-17 ENCOUNTER — PATIENT MESSAGE (OUTPATIENT)
Dept: CARDIOLOGY | Facility: CLINIC | Age: 87
End: 2022-11-17
Payer: MEDICARE

## 2022-11-28 ENCOUNTER — TELEPHONE (OUTPATIENT)
Dept: UROLOGY | Facility: CLINIC | Age: 87
End: 2022-11-28
Payer: MEDICARE

## 2022-11-28 ENCOUNTER — PATIENT MESSAGE (OUTPATIENT)
Dept: UROLOGY | Facility: CLINIC | Age: 87
End: 2022-11-28
Payer: MEDICARE

## 2022-11-28 NOTE — TELEPHONE ENCOUNTER
----- Message from Viki Coyne sent at 11/28/2022 12:49 PM CST -----  Regarding: Instructions for Procedure  Contact: pt @ [phone]  Pt's wife is calling to get instructions for procedure on tomorrow. Pt take aspirins, and need to get clarification about taking it. Asking for a call back

## 2022-11-29 ENCOUNTER — PROCEDURE VISIT (OUTPATIENT)
Dept: UROLOGY | Facility: CLINIC | Age: 87
End: 2022-11-29
Payer: MEDICARE

## 2022-11-29 DIAGNOSIS — N13.8 BPH WITH URINARY OBSTRUCTION: ICD-10-CM

## 2022-11-29 DIAGNOSIS — N40.1 BPH WITH URINARY OBSTRUCTION: ICD-10-CM

## 2022-11-29 PROCEDURE — 76872 PR US TRANSRECTAL: ICD-10-PCS | Mod: 26,S$GLB,, | Performed by: UROLOGY

## 2022-11-29 PROCEDURE — 76872 US TRANSRECTAL: CPT | Mod: 26,S$GLB,, | Performed by: UROLOGY

## 2022-11-29 NOTE — PROCEDURES
Transrectal ultrasound    Date/Time: 11/29/2022 3:15 PM  Performed by: Lalo Diamond Jr., MD  Authorized by: Lalo Diamond Jr., MD     Consent Done?:  Yes (Written)  Indications: Prostate Nodules    Position:  Left lateral  Anesthesia:  Lidocaine jelly  Patient sedated: No    Prostate Size:  53 gm prostate  Lesions:: No       Observe  Cont proscar  RTC 6 mos to check sxs

## 2022-12-12 ENCOUNTER — TELEPHONE (OUTPATIENT)
Dept: CARDIOLOGY | Facility: HOSPITAL | Age: 87
End: 2022-12-12
Payer: MEDICARE

## 2022-12-14 ENCOUNTER — HOSPITAL ENCOUNTER (OUTPATIENT)
Dept: CARDIOLOGY | Facility: HOSPITAL | Age: 87
Discharge: HOME | End: 2022-12-14
Attending: INTERNAL MEDICINE
Payer: MEDICARE

## 2022-12-14 VITALS
RESPIRATION RATE: 16 BRPM | SYSTOLIC BLOOD PRESSURE: 141 MMHG | DIASTOLIC BLOOD PRESSURE: 56 MMHG | HEIGHT: 70 IN | HEART RATE: 74 BPM | WEIGHT: 175 LBS | BODY MASS INDEX: 25.05 KG/M2

## 2022-12-14 DIAGNOSIS — I50.41 ACUTE COMBINED SYSTOLIC AND DIASTOLIC HEART FAILURE: ICD-10-CM

## 2022-12-14 DIAGNOSIS — Z95.1 S/P CABG (CORONARY ARTERY BYPASS GRAFT): ICD-10-CM

## 2022-12-14 LAB
CFR FLOW - ANTERIOR: 2.19
CFR FLOW - INFERIOR: 2.14
CFR FLOW - LATERAL: 2
CFR FLOW - MAX: 2.82
CFR FLOW - MIN: 1.12
CFR FLOW - SEPTAL: 2.25
CFR FLOW - WHOLE HEART: 2.15
CV STRESS BASE HR: 72 BPM
DIASTOLIC BLOOD PRESSURE: 88 MMHG
EJECTION FRACTION- HIGH: 59 %
END DIASTOLIC INDEX-HIGH: 155 ML/M2
END DIASTOLIC INDEX-LOW: 91 ML/M2
END SYSTOLIC INDEX-HIGH: 78 ML/M2
END SYSTOLIC INDEX-LOW: 40 ML/M2
NUC REST DIASTOLIC VOLUME INDEX: 76
NUC REST EJECTION FRACTION: 74
NUC REST SYSTOLIC VOLUME INDEX: 20
NUC STRESS DIASTOLIC VOLUME INDEX: 84
NUC STRESS EJECTION FRACTION: 77 %
NUC STRESS SYSTOLIC VOLUME INDEX: 19
OHS CV CPX 1 MINUTE RECOVERY HEART RATE: 86 BPM
OHS CV CPX 85 PERCENT MAX PREDICTED HEART RATE MALE: 114
OHS CV CPX MAX PREDICTED HEART RATE: 134
OHS CV CPX PATIENT IS FEMALE: 0
OHS CV CPX PATIENT IS MALE: 1
OHS CV CPX PEAK HEAR RATE: 71 BPM
OHS CV CPX PERCENT MAX PREDICTED HEART RATE ACHIEVED: 53
OHS CV CPX RATE PRESSURE PRODUCT PRESENTING: NORMAL
REST FLOW - ANTERIOR: 0.68 CC/MIN/G
REST FLOW - INFERIOR: 0.69 CC/MIN/G
REST FLOW - LATERAL: 0.81 CC/MIN/G
REST FLOW - MAX: 0.92 CC/MIN/G
REST FLOW - MIN: 0.29 CC/MIN/G
REST FLOW - SEPTAL: 0.67 CC/MIN/G
REST FLOW - WHOLE HEART: 0.71 CC/MIN/G
RETIRED EF AND QEF - SEE NOTES: 47 %
STRESS FLOW - ANTERIOR: 1.48 CC/MIN/G
STRESS FLOW - INFERIOR: 1.45 CC/MIN/G
STRESS FLOW - LATERAL: 1.62 CC/MIN/G
STRESS FLOW - MAX: 2.02 CC/MIN/G
STRESS FLOW - MIN: 0.39 CC/MIN/G
STRESS FLOW - SEPTAL: 1.53 CC/MIN/G
STRESS FLOW - WHOLE HEART: 1.52 CC/MIN/G
SYSTOLIC BLOOD PRESSURE: 185 MMHG

## 2022-12-14 PROCEDURE — 93016 CARDIAC PET SCAN STRESS (CUPID ONLY): ICD-10-PCS | Mod: ,,, | Performed by: INTERNAL MEDICINE

## 2022-12-14 PROCEDURE — 78434 AQMBF PET REST & RX STRESS: CPT

## 2022-12-14 PROCEDURE — 93016 CV STRESS TEST SUPVJ ONLY: CPT | Mod: ,,, | Performed by: INTERNAL MEDICINE

## 2022-12-14 PROCEDURE — 78431 CARDIAC PET SCAN STRESS (CUPID ONLY): ICD-10-PCS | Mod: 26,,, | Performed by: INTERNAL MEDICINE

## 2022-12-14 PROCEDURE — 93018 CV STRESS TEST I&R ONLY: CPT | Mod: ,,, | Performed by: INTERNAL MEDICINE

## 2022-12-14 PROCEDURE — 78431 MYOCRD IMG PET RST&STRS CT: CPT | Mod: 26,,, | Performed by: INTERNAL MEDICINE

## 2022-12-14 PROCEDURE — 78434 AQMBF PET REST & RX STRESS: CPT | Mod: 26,,, | Performed by: INTERNAL MEDICINE

## 2022-12-14 PROCEDURE — 78434 CARDIAC PET SCAN STRESS (CUPID ONLY): ICD-10-PCS | Mod: 26,,, | Performed by: INTERNAL MEDICINE

## 2022-12-14 PROCEDURE — A9555 RB82 RUBIDIUM: HCPCS

## 2022-12-14 PROCEDURE — 63600175 PHARM REV CODE 636 W HCPCS: Performed by: INTERNAL MEDICINE

## 2022-12-14 PROCEDURE — 93018 CARDIAC PET SCAN STRESS (CUPID ONLY): ICD-10-PCS | Mod: ,,, | Performed by: INTERNAL MEDICINE

## 2022-12-14 RX ORDER — CAFFEINE CITRATE 20 MG/ML
60 SOLUTION INTRAVENOUS ONCE
Status: COMPLETED | OUTPATIENT
Start: 2022-12-14 | End: 2022-12-14

## 2022-12-14 RX ORDER — REGADENOSON 0.08 MG/ML
0.4 INJECTION, SOLUTION INTRAVENOUS ONCE
Status: COMPLETED | OUTPATIENT
Start: 2022-12-14 | End: 2022-12-14

## 2022-12-14 RX ADMIN — CAFFEINE CITRATE 60 MG: 20 INJECTION INTRAVENOUS at 01:12

## 2022-12-14 RX ADMIN — REGADENOSON 0.4 MG: 0.08 INJECTION, SOLUTION INTRAVENOUS at 01:12

## 2023-01-03 ENCOUNTER — OFFICE VISIT (OUTPATIENT)
Dept: OPHTHALMOLOGY | Facility: CLINIC | Age: 88
End: 2023-01-03
Attending: OPHTHALMOLOGY
Payer: MEDICARE

## 2023-01-03 DIAGNOSIS — H25.13 NUCLEAR SCLEROTIC CATARACT, BILATERAL: Primary | ICD-10-CM

## 2023-01-03 DIAGNOSIS — H35.363 DRUSEN (DEGENERATIVE) OF MACULA, BILATERAL: ICD-10-CM

## 2023-01-03 PROCEDURE — 99204 OFFICE O/P NEW MOD 45 MIN: CPT | Mod: S$GLB,,, | Performed by: OPHTHALMOLOGY

## 2023-01-03 PROCEDURE — 1160F RVW MEDS BY RX/DR IN RCRD: CPT | Mod: CPTII,S$GLB,, | Performed by: OPHTHALMOLOGY

## 2023-01-03 PROCEDURE — 99999 PR PBB SHADOW E&M-EST. PATIENT-LVL III: ICD-10-PCS | Mod: PBBFAC,,, | Performed by: OPHTHALMOLOGY

## 2023-01-03 PROCEDURE — 99999 PR PBB SHADOW E&M-EST. PATIENT-LVL III: CPT | Mod: PBBFAC,,, | Performed by: OPHTHALMOLOGY

## 2023-01-03 PROCEDURE — 3288F FALL RISK ASSESSMENT DOCD: CPT | Mod: CPTII,S$GLB,, | Performed by: OPHTHALMOLOGY

## 2023-01-03 PROCEDURE — 92136 IOL MASTER - OU - BOTH EYES: ICD-10-PCS | Mod: RT,S$GLB,, | Performed by: OPHTHALMOLOGY

## 2023-01-03 PROCEDURE — 3288F PR FALLS RISK ASSESSMENT DOCUMENTED: ICD-10-PCS | Mod: CPTII,S$GLB,, | Performed by: OPHTHALMOLOGY

## 2023-01-03 PROCEDURE — 1126F AMNT PAIN NOTED NONE PRSNT: CPT | Mod: CPTII,S$GLB,, | Performed by: OPHTHALMOLOGY

## 2023-01-03 PROCEDURE — 99204 PR OFFICE/OUTPT VISIT, NEW, LEVL IV, 45-59 MIN: ICD-10-PCS | Mod: S$GLB,,, | Performed by: OPHTHALMOLOGY

## 2023-01-03 PROCEDURE — 1160F PR REVIEW ALL MEDS BY PRESCRIBER/CLIN PHARMACIST DOCUMENTED: ICD-10-PCS | Mod: CPTII,S$GLB,, | Performed by: OPHTHALMOLOGY

## 2023-01-03 PROCEDURE — 1126F PR PAIN SEVERITY QUANTIFIED, NO PAIN PRESENT: ICD-10-PCS | Mod: CPTII,S$GLB,, | Performed by: OPHTHALMOLOGY

## 2023-01-03 PROCEDURE — 1159F MED LIST DOCD IN RCRD: CPT | Mod: CPTII,S$GLB,, | Performed by: OPHTHALMOLOGY

## 2023-01-03 PROCEDURE — 1101F PT FALLS ASSESS-DOCD LE1/YR: CPT | Mod: CPTII,S$GLB,, | Performed by: OPHTHALMOLOGY

## 2023-01-03 PROCEDURE — 1159F PR MEDICATION LIST DOCUMENTED IN MEDICAL RECORD: ICD-10-PCS | Mod: CPTII,S$GLB,, | Performed by: OPHTHALMOLOGY

## 2023-01-03 PROCEDURE — 92136 OPHTHALMIC BIOMETRY: CPT | Mod: RT,S$GLB,, | Performed by: OPHTHALMOLOGY

## 2023-01-03 PROCEDURE — 1101F PR PT FALLS ASSESS DOC 0-1 FALLS W/OUT INJ PAST YR: ICD-10-PCS | Mod: CPTII,S$GLB,, | Performed by: OPHTHALMOLOGY

## 2023-01-03 RX ORDER — MOXIFLOXACIN 5 MG/ML
1 SOLUTION/ DROPS OPHTHALMIC
Status: CANCELLED | OUTPATIENT
Start: 2023-01-03

## 2023-01-03 RX ORDER — PHENYLEPHRINE HYDROCHLORIDE 25 MG/ML
1 SOLUTION/ DROPS OPHTHALMIC
Status: CANCELLED | OUTPATIENT
Start: 2023-01-03

## 2023-01-03 RX ORDER — TETRACAINE HYDROCHLORIDE 5 MG/ML
1 SOLUTION OPHTHALMIC
Status: CANCELLED | OUTPATIENT
Start: 2023-01-03

## 2023-01-03 RX ORDER — TROPICAMIDE 10 MG/ML
1 SOLUTION/ DROPS OPHTHALMIC
Status: CANCELLED | OUTPATIENT
Start: 2023-01-03

## 2023-01-03 RX ORDER — PHENYLEPHRINE HYDROCHLORIDE 100 MG/ML
1 SOLUTION/ DROPS OPHTHALMIC
Status: CANCELLED | OUTPATIENT
Start: 2023-01-03

## 2023-01-03 RX ORDER — PREDNISOLONE ACETATE-GATIFLOXACIN-BROMFENAC .75; 5; 1 MG/ML; MG/ML; MG/ML
1 SUSPENSION/ DROPS OPHTHALMIC 3 TIMES DAILY
Qty: 5 ML | Refills: 3 | Status: SHIPPED | OUTPATIENT
Start: 2023-01-03

## 2023-01-03 NOTE — PROGRESS NOTES
HPI    Patient presents today for a Cataract Evaluation. Patient notes vision as   blurry. Patient notes difficulty driving at night and has issues with   glare. Patient notes no eye pain.   Last edited by Chong Ingram on 1/3/2023  1:15 PM.            Assessment /Plan     For exam results, see Encounter Report.    Nuclear sclerotic cataract, bilateral      Visually Significant Cataract: Patient reports decreased vision consistent with the clinical amount of lenticular opacity, which reaches the level of visual significance and affects activities of daily living.     Specifically, this patient describes difficulty with:  - driving safely at night  - reading road signs  - reading small print  - deciphering medicine bottles  - reading the newspaper  - using the phone  - reading texts     Risks, benefits, and alternatives to cataract surgery were discussed and the consent reviewed. IOL options were discussed, including ATIOLs and the associated side effects and additional patient cost associated with them.   IOL Selections:   Right eye  IOL: CNA0T0 23.0     Left eye  IOL: CNA0T0 23.0    Pt wishes to have RIGHT eye done first.

## 2023-02-17 ENCOUNTER — TELEPHONE (OUTPATIENT)
Dept: OPHTHALMOLOGY | Facility: CLINIC | Age: 88
End: 2023-02-17
Payer: MEDICARE

## 2023-02-17 DIAGNOSIS — H25.11 NUCLEAR SCLEROTIC CATARACT OF RIGHT EYE: Primary | ICD-10-CM

## 2023-03-02 ENCOUNTER — ANESTHESIA EVENT (OUTPATIENT)
Dept: SURGERY | Facility: HOSPITAL | Age: 88
End: 2023-03-02
Payer: MEDICARE

## 2023-03-02 ENCOUNTER — TELEPHONE (OUTPATIENT)
Dept: OPHTHALMOLOGY | Facility: CLINIC | Age: 88
End: 2023-03-02
Payer: MEDICARE

## 2023-03-02 NOTE — ANESTHESIA PREPROCEDURE EVALUATION
03/02/2023  Chang Braswell is a 87 y.o., male.      Pre-op Assessment    I have reviewed the Patient Summary Reports.     I have reviewed the Nursing Notes. I have reviewed the NPO Status.   I have reviewed the Medications.     Review of Systems  Anesthesia Hx:  No problems with previous Anesthesia  Denies Family Hx of Anesthesia complications.   Denies Personal Hx of Anesthesia complications.   Hematology/Oncology:  Hematology Normal   Oncology Normal     EENT/Dental:  EENT/Dental Normal Eyes:    Cardiovascular:   Hypertension (ER visit 12/22) Valvular problems/Murmurs CAD  CABG/stent Dysrhythmias (PVC)  CHF hyperlipidemia NYHA Classification II Aortic synthetic valve regurgitation  CABG  H/o CHF  Coronary Artery Disease: S/P Aorto-Coronary Bypass Graft Surgery (CABG):  Valvular Heart Disease: Aortic Regurgitation (AR), s/p replacement, recurrent regurgitation   Hypertension    Pulmonary:  Pulmonary Normal    Renal/:   BPH    Hepatic/GI:   GERD    Musculoskeletal:   Arthritis     Neurological:   TIA, Cognitive dysfunction  Advanced age  Hearing problems   Endocrine:  Denies Diabetes  Denies Thyroid Disease   Denies Pituitary Disease        Physical Exam  General: Well nourished    Airway:  Mallampati: II   Mouth Opening: Normal  TM Distance: Normal  Tongue: Normal  Neck ROM: Normal ROM        Anesthesia Plan  Type of Anesthesia, risks & benefits discussed:    Anesthesia Type: MAC  Intra-op Monitoring Plan: Standard ASA Monitors  Post Op Pain Control Plan: multimodal analgesia and IV/PO Opioids PRN  Induction:  IV  Informed Consent: Informed consent signed with the Patient and all parties understand the risks and agree with anesthesia plan.  All questions answered.   ASA Score: 3  Day of Surgery Review of History & Physical: H&P Update referred to the surgeon/provider.I have interviewed and examined  the patient. I have reviewed the patient's H&P dated: There are no significant changes. H&P completed by Anesthesiologist.  Anesthesia Plan Notes: Hearing decreased  Cognitive issues  Speak slowly and clearly to him     Ready For Surgery From Anesthesia Perspective.     .

## 2023-03-02 NOTE — TELEPHONE ENCOUNTER
Patient given arrival time of 9:30 am on Monday March 6 . Nothing to eat or drink after 9 pm.  Water, Gatorade after 9 pm until leaves home.  Start drops into the operative eye today. [address]

## 2023-03-06 ENCOUNTER — HOSPITAL ENCOUNTER (OUTPATIENT)
Facility: HOSPITAL | Age: 88
Discharge: HOME | End: 2023-03-06
Attending: OPHTHALMOLOGY | Admitting: OPHTHALMOLOGY
Payer: MEDICARE

## 2023-03-06 ENCOUNTER — ANESTHESIA (OUTPATIENT)
Dept: SURGERY | Facility: HOSPITAL | Age: 88
End: 2023-03-06
Payer: MEDICARE

## 2023-03-06 VITALS
OXYGEN SATURATION: 96 % | HEIGHT: 70 IN | DIASTOLIC BLOOD PRESSURE: 77 MMHG | WEIGHT: 170 LBS | HEART RATE: 66 BPM | TEMPERATURE: 98 F | SYSTOLIC BLOOD PRESSURE: 156 MMHG | BODY MASS INDEX: 24.34 KG/M2 | RESPIRATION RATE: 22 BRPM

## 2023-03-06 DIAGNOSIS — H25.11 NUCLEAR SCLEROTIC CATARACT OF RIGHT EYE: Primary | ICD-10-CM

## 2023-03-06 DIAGNOSIS — H25.13 NUCLEAR SCLEROTIC CATARACT, BILATERAL: ICD-10-CM

## 2023-03-06 DIAGNOSIS — H35.363 DRUSEN (DEGENERATIVE) OF MACULA, BILATERAL: ICD-10-CM

## 2023-03-06 DIAGNOSIS — R00.1 BRADYCARDIA: ICD-10-CM

## 2023-03-06 PROCEDURE — D9220A PRA ANESTHESIA: Mod: ANES,,, | Performed by: ANESTHESIOLOGY

## 2023-03-06 PROCEDURE — 71000016 HC POSTOP RECOV ADDL HR: Performed by: OPHTHALMOLOGY

## 2023-03-06 PROCEDURE — 36000707: Performed by: OPHTHALMOLOGY

## 2023-03-06 PROCEDURE — V2632 POST CHMBR INTRAOCULAR LENS: HCPCS | Performed by: OPHTHALMOLOGY

## 2023-03-06 PROCEDURE — 63600175 PHARM REV CODE 636 W HCPCS: Performed by: NURSE ANESTHETIST, CERTIFIED REGISTERED

## 2023-03-06 PROCEDURE — 66984 XCAPSL CTRC RMVL W/O ECP: CPT | Mod: RT,,, | Performed by: OPHTHALMOLOGY

## 2023-03-06 PROCEDURE — 71000015 HC POSTOP RECOV 1ST HR: Performed by: OPHTHALMOLOGY

## 2023-03-06 PROCEDURE — D9220A PRA ANESTHESIA: ICD-10-PCS | Mod: CRNA,,, | Performed by: NURSE ANESTHETIST, CERTIFIED REGISTERED

## 2023-03-06 PROCEDURE — 99900035 HC TECH TIME PER 15 MIN (STAT)

## 2023-03-06 PROCEDURE — 36000706: Performed by: OPHTHALMOLOGY

## 2023-03-06 PROCEDURE — D9220A PRA ANESTHESIA: Mod: CRNA,,, | Performed by: NURSE ANESTHETIST, CERTIFIED REGISTERED

## 2023-03-06 PROCEDURE — 25000003 PHARM REV CODE 250: Performed by: OPHTHALMOLOGY

## 2023-03-06 PROCEDURE — 37000009 HC ANESTHESIA EA ADD 15 MINS: Performed by: OPHTHALMOLOGY

## 2023-03-06 PROCEDURE — D9220A PRA ANESTHESIA: ICD-10-PCS | Mod: ANES,,, | Performed by: ANESTHESIOLOGY

## 2023-03-06 PROCEDURE — 66984 PR REMOVAL, CATARACT, W/INSRT INTRAOC LENS, W/O ENDO CYCLO: ICD-10-PCS | Mod: RT,,, | Performed by: OPHTHALMOLOGY

## 2023-03-06 PROCEDURE — 37000008 HC ANESTHESIA 1ST 15 MINUTES: Performed by: OPHTHALMOLOGY

## 2023-03-06 DEVICE — LENS CLAREON AUTONOME 23.0D: Type: IMPLANTABLE DEVICE | Site: EYE | Status: FUNCTIONAL

## 2023-03-06 RX ORDER — TETRACAINE HYDROCHLORIDE 5 MG/ML
SOLUTION OPHTHALMIC
Status: DISCONTINUED | OUTPATIENT
Start: 2023-03-06 | End: 2023-03-06 | Stop reason: HOSPADM

## 2023-03-06 RX ORDER — TETRACAINE HYDROCHLORIDE 5 MG/ML
1 SOLUTION OPHTHALMIC
Status: COMPLETED | OUTPATIENT
Start: 2023-03-06 | End: 2023-03-06

## 2023-03-06 RX ORDER — LIDOCAINE HYDROCHLORIDE 40 MG/ML
INJECTION, SOLUTION RETROBULBAR
Status: DISCONTINUED | OUTPATIENT
Start: 2023-03-06 | End: 2023-03-06 | Stop reason: HOSPADM

## 2023-03-06 RX ORDER — PHENYLEPHRINE HYDROCHLORIDE 25 MG/ML
1 SOLUTION/ DROPS OPHTHALMIC
Status: COMPLETED | OUTPATIENT
Start: 2023-03-06 | End: 2023-03-06

## 2023-03-06 RX ORDER — MOXIFLOXACIN 5 MG/ML
SOLUTION/ DROPS OPHTHALMIC
Status: DISCONTINUED | OUTPATIENT
Start: 2023-03-06 | End: 2023-03-06 | Stop reason: HOSPADM

## 2023-03-06 RX ORDER — MIDAZOLAM HYDROCHLORIDE 1 MG/ML
INJECTION, SOLUTION INTRAMUSCULAR; INTRAVENOUS
Status: DISCONTINUED | OUTPATIENT
Start: 2023-03-06 | End: 2023-03-06

## 2023-03-06 RX ORDER — MOXIFLOXACIN 5 MG/ML
1 SOLUTION/ DROPS OPHTHALMIC
Status: COMPLETED | OUTPATIENT
Start: 2023-03-06 | End: 2023-03-06

## 2023-03-06 RX ORDER — PROPARACAINE HYDROCHLORIDE 5 MG/ML
1 SOLUTION/ DROPS OPHTHALMIC
Status: DISCONTINUED | OUTPATIENT
Start: 2023-03-06 | End: 2023-03-06 | Stop reason: HOSPADM

## 2023-03-06 RX ORDER — ACETAMINOPHEN 325 MG/1
650 TABLET ORAL EVERY 4 HOURS PRN
Status: DISCONTINUED | OUTPATIENT
Start: 2023-03-06 | End: 2023-03-06 | Stop reason: HOSPADM

## 2023-03-06 RX ORDER — PHENYLEPHRINE HYDROCHLORIDE 100 MG/ML
1 SOLUTION/ DROPS OPHTHALMIC
Status: DISCONTINUED | OUTPATIENT
Start: 2023-03-06 | End: 2023-03-06 | Stop reason: HOSPADM

## 2023-03-06 RX ORDER — TROPICAMIDE 10 MG/ML
1 SOLUTION/ DROPS OPHTHALMIC
Status: COMPLETED | OUTPATIENT
Start: 2023-03-06 | End: 2023-03-06

## 2023-03-06 RX ADMIN — TROPICAMIDE 1 DROP: 10 SOLUTION/ DROPS OPHTHALMIC at 09:03

## 2023-03-06 RX ADMIN — MOXIFLOXACIN OPHTHALMIC 1 DROP: 5 SOLUTION/ DROPS OPHTHALMIC at 09:03

## 2023-03-06 RX ADMIN — MOXIFLOXACIN OPHTHALMIC 1 DROP: 5 SOLUTION/ DROPS OPHTHALMIC at 10:03

## 2023-03-06 RX ADMIN — PHENYLEPHRINE HYDROCHLORIDE 1 DROP: 25 SOLUTION/ DROPS OPHTHALMIC at 09:03

## 2023-03-06 RX ADMIN — TETRACAINE HYDROCHLORIDE 1 DROP: 5 SOLUTION OPHTHALMIC at 09:03

## 2023-03-06 RX ADMIN — MIDAZOLAM 2 MG: 1 INJECTION INTRAMUSCULAR; INTRAVENOUS at 11:03

## 2023-03-06 RX ADMIN — TETRACAINE HYDROCHLORIDE 1 DROP: 5 SOLUTION OPHTHALMIC at 10:03

## 2023-03-06 RX ADMIN — MOXIFLOXACIN 1 DROP: 5 SOLUTION/ DROPS OPHTHALMIC at 11:03

## 2023-03-06 RX ADMIN — PHENYLEPHRINE HYDROCHLORIDE 1 DROP: 25 SOLUTION/ DROPS OPHTHALMIC at 10:03

## 2023-03-06 RX ADMIN — TROPICAMIDE 1 DROP: 10 SOLUTION/ DROPS OPHTHALMIC at 10:03

## 2023-03-06 NOTE — PLAN OF CARE
Explained discharge instructions, did vitals and assessment every 15min, patient criteria for discharge per adrelette score

## 2023-03-06 NOTE — TRANSFER OF CARE
"Anesthesia Transfer of Care Note    Patient: Chang Braswell    Procedure(s) Performed: Procedure(s) (LRB):  EXTRACTION, CATARACT, WITH IOL INSERTION (Right)    Patient location: [facility]    Anesthesia Type: MAC    Transport from OR: Transported from OR on room air with adequate spontaneous ventilation    Post pain: adequate analgesia    Post assessment: no apparent anesthetic complications    Post vital signs: stable    Level of consciousness: awake, alert and oriented    Nausea/Vomiting: no nausea/vomiting    Complications: none    Transfer of care protocol was followed      Last vitals:   Visit Vitals  BP (!) 151/67 (BP Location: Right arm, Patient Position: Lying)   Pulse (!) 59   Temp 36.7 °C (98.1 °F) (Temporal)   Resp 18   Ht 5' 10" (1.778 m)   Wt 77.1 kg (170 lb)   SpO2 97%   BMI 24.39 kg/m²     "

## 2023-03-06 NOTE — OP NOTE
SURGEON:  Brian Davis M.D.    PREOPERATIVE DIAGNOSIS:    Nuclear Sclerotic Cataract Right Eye    POSTOPERATIVE DIAGNOSIS:    Nuclear Sclerotic Cataract Right Eye    PROCEDURES:    Phacoemulsification with  intraocular lens, Right eye (35271)    DATE OF SURGERY: 03/06/2023    IMPLANT: CNA0T0 23.0    ANESTHESIA:  MAC with topical Lidocaine    COMPLICATIONS:  None    ESTIMATED BLOOD LOSS: None    SPECIMENS: None    INDICATIONS:    The patient has a history of painless progressive visual loss and difficulty with activities of daily living, which specifically include difficult driving at night due to glare and difficulty reading small print, secondary to cataract formation.  After a thorough discussion of the risks, benefits, and alternatives to cataract surgery, including, but not limited to, the rare risks of infection, retinal detachment, hemorrhage, need for additional surgery, loss of vision, and even loss of the eye, the patient voices understanding and desires to proceed.    DESCRIPTION OF PROCEDURE:    The patients IOL calculations were reviewed, and the lens selection confirmed.   After verification and marking of the proper eye in the preop holding area, the patient was brought to the operating room in supine position where the eye was prepped and draped in standard sterile fashion with 5% Betadine and a lid speculum placed in the eye.   Topical 4% Lidocaine was used in addition to the preoperative anesthesia and the procedure was begun by the creation of a paracentesis incision through which viscoelastic was used to fill the anterior chamber.  Next, a keratome blade was used to create a triplanar temporal clear corneal incision and a cystotome and Utrata forceps used to fashion a continuous curvilinear capsulorrhexis.  Hydrodissection was carried out using the Mandujano hydrodissection cannula and the nucleus was found to be mobile.  Phacoemulsification of the nucleus was carried out using a quick chop technique,  and all remaining epinuclear and cortical material was removed.  The eye was then reformed with Viscoelastic and the  intraocular lens was implanted into the capsular bag.  All remaining viscoelastics were removed from the eye and at the end of the case the pupil was round, the lens was well-centered within the capsular bag and all wounds were found to be water tight.  Drops of Vigamox and Pred Forte were instilled and a shield was placed over the eye. The patient will follow up with Dr. Davis in the morning.

## 2023-03-06 NOTE — PROGRESS NOTES
Monitor picked up bradycardia in 30's while pt in post op. Placed on monitor and observed on telemetry for extended time. Paged Dr. Phan who visited pt at bedside. Pt asymptomatic and HR in 50's. After 1 hour of observation with no decrease in HR, OK for dc per Dr. Phan

## 2023-03-06 NOTE — DISCHARGE SUMMARY
Outcome: Successful outpatient ophthalmic surgical procedure  Preprinted Instructions given to patient.  Regular diet.  Activity: No restrictions  Meds: see Med Rec  Condition: stable  Follow up: 1 day with Dr Davis  Disposition: Home  Diagnosis: s/p eye surgery  Date of discharge: 03/06/2023

## 2023-03-06 NOTE — ANESTHESIA POSTPROCEDURE EVALUATION
Anesthesia Post Evaluation    [facility]    Procedure(s) Performed: Procedure(s) (LRB):  EXTRACTION, CATARACT, WITH IOL INSERTION (Right)    Final Anesthesia Type: MAC      Patient location during evaluation: PACU  Patient participation: Yes- Able to Participate  Level of consciousness: awake and alert  Post-procedure vital signs: reviewed and stable  Pain management: adequate  Airway patency: patent    PONV status at discharge: No PONV  Anesthetic complications: no      Cardiovascular status: blood pressure returned to baseline  Respiratory status: unassisted  Hydration status: euvolemic  Follow-up not needed.          Vitals Value Taken Time   /72 03/06/23 1217   Temp 36.7 °C (98 °F) 03/06/23 1152   Pulse 62 03/06/23 1221   Resp 34 03/06/23 1221   SpO2 96 % 03/06/23 1220   Vitals shown include unvalidated device data.      No case tracking events are documented in the log.      Pain/Tin Score: Tin Score: 10 (3/6/2023 11:46 AM)

## 2023-03-07 ENCOUNTER — OFFICE VISIT (OUTPATIENT)
Dept: OPHTHALMOLOGY | Facility: CLINIC | Age: 88
End: 2023-03-07
Payer: MEDICARE

## 2023-03-07 DIAGNOSIS — H25.13 NUCLEAR SCLEROSIS, BILATERAL: Primary | ICD-10-CM

## 2023-03-07 DIAGNOSIS — Z98.890 POST-OPERATIVE STATE: ICD-10-CM

## 2023-03-07 PROCEDURE — 1126F AMNT PAIN NOTED NONE PRSNT: CPT | Mod: CPTII,S$GLB,, | Performed by: OPHTHALMOLOGY

## 2023-03-07 PROCEDURE — 1101F PT FALLS ASSESS-DOCD LE1/YR: CPT | Mod: CPTII,S$GLB,, | Performed by: OPHTHALMOLOGY

## 2023-03-07 PROCEDURE — 99999 PR PBB SHADOW E&M-EST. PATIENT-LVL III: CPT | Mod: PBBFAC,,, | Performed by: OPHTHALMOLOGY

## 2023-03-07 PROCEDURE — 3288F FALL RISK ASSESSMENT DOCD: CPT | Mod: CPTII,S$GLB,, | Performed by: OPHTHALMOLOGY

## 2023-03-07 PROCEDURE — 1160F PR REVIEW ALL MEDS BY PRESCRIBER/CLIN PHARMACIST DOCUMENTED: ICD-10-PCS | Mod: CPTII,S$GLB,, | Performed by: OPHTHALMOLOGY

## 2023-03-07 PROCEDURE — 3288F PR FALLS RISK ASSESSMENT DOCUMENTED: ICD-10-PCS | Mod: CPTII,S$GLB,, | Performed by: OPHTHALMOLOGY

## 2023-03-07 PROCEDURE — 1160F RVW MEDS BY RX/DR IN RCRD: CPT | Mod: CPTII,S$GLB,, | Performed by: OPHTHALMOLOGY

## 2023-03-07 PROCEDURE — 99024 PR POST-OP FOLLOW-UP VISIT: ICD-10-PCS | Mod: S$GLB,,, | Performed by: OPHTHALMOLOGY

## 2023-03-07 PROCEDURE — 1159F MED LIST DOCD IN RCRD: CPT | Mod: CPTII,S$GLB,, | Performed by: OPHTHALMOLOGY

## 2023-03-07 PROCEDURE — 1101F PR PT FALLS ASSESS DOC 0-1 FALLS W/OUT INJ PAST YR: ICD-10-PCS | Mod: CPTII,S$GLB,, | Performed by: OPHTHALMOLOGY

## 2023-03-07 PROCEDURE — 1126F PR PAIN SEVERITY QUANTIFIED, NO PAIN PRESENT: ICD-10-PCS | Mod: CPTII,S$GLB,, | Performed by: OPHTHALMOLOGY

## 2023-03-07 PROCEDURE — 99024 POSTOP FOLLOW-UP VISIT: CPT | Mod: S$GLB,,, | Performed by: OPHTHALMOLOGY

## 2023-03-07 PROCEDURE — 99999 PR PBB SHADOW E&M-EST. PATIENT-LVL III: ICD-10-PCS | Mod: PBBFAC,,, | Performed by: OPHTHALMOLOGY

## 2023-03-07 PROCEDURE — 1159F PR MEDICATION LIST DOCUMENTED IN MEDICAL RECORD: ICD-10-PCS | Mod: CPTII,S$GLB,, | Performed by: OPHTHALMOLOGY

## 2023-03-07 NOTE — PROGRESS NOTES
HPI    DATE OF SURGERY: 03/06/2023  IMPLANT: CNA0T0 23.0 OD    PGB TID OD    Pt here for 1 day post op OD.  Pt states doing well. Pt denies eye pain   OD.   Last edited by Joanna Vargas MA on 3/7/2023  8:27 AM.            Assessment /Plan     For exam results, see Encounter Report.    Nuclear sclerosis, bilateral    Post-operative state      Slit lamp exam:  L/L: nl  K: clear, wound sealed  AC: 1+ cell  Lens: IOL centered and stable    POD1 s/p Phaco/IOL  Appropriate precautions and post op medications reviewed.  Patient instructed to call or come in if symptoms of redness, decreased vision, or pain are experienced.

## 2023-03-10 ENCOUNTER — TELEPHONE (OUTPATIENT)
Dept: OPHTHALMOLOGY | Facility: CLINIC | Age: 88
End: 2023-03-10
Payer: MEDICARE

## 2023-03-10 DIAGNOSIS — H25.12 NUCLEAR SCLEROTIC CATARACT OF LEFT EYE: Primary | ICD-10-CM

## 2023-03-14 ENCOUNTER — OFFICE VISIT (OUTPATIENT)
Dept: OPHTHALMOLOGY | Facility: CLINIC | Age: 88
End: 2023-03-14
Payer: MEDICARE

## 2023-03-14 DIAGNOSIS — H25.13 NUCLEAR SCLEROSIS, BILATERAL: Primary | ICD-10-CM

## 2023-03-14 DIAGNOSIS — Z98.890 POST-OPERATIVE STATE: ICD-10-CM

## 2023-03-14 PROCEDURE — 99024 POSTOP FOLLOW-UP VISIT: CPT | Mod: S$GLB,,, | Performed by: OPHTHALMOLOGY

## 2023-03-14 PROCEDURE — 99024 PR POST-OP FOLLOW-UP VISIT: ICD-10-PCS | Mod: S$GLB,,, | Performed by: OPHTHALMOLOGY

## 2023-03-14 PROCEDURE — 99999 PR PBB SHADOW E&M-EST. PATIENT-LVL II: CPT | Mod: PBBFAC,,, | Performed by: OPHTHALMOLOGY

## 2023-03-14 PROCEDURE — 92136 IOL MASTER - OU - BOTH EYES: ICD-10-PCS | Mod: 26,LT,S$GLB, | Performed by: OPHTHALMOLOGY

## 2023-03-14 PROCEDURE — 99999 PR PBB SHADOW E&M-EST. PATIENT-LVL II: ICD-10-PCS | Mod: PBBFAC,,, | Performed by: OPHTHALMOLOGY

## 2023-03-14 PROCEDURE — 92136 OPHTHALMIC BIOMETRY: CPT | Mod: 26,LT,S$GLB, | Performed by: OPHTHALMOLOGY

## 2023-03-14 NOTE — PROGRESS NOTES
HPI     Post-op Evaluation            Comments: Chang Braswell is a 86 y/o male           Comments    Pt here for 1 week Post Op   Pt state no complaints at this time   Denies F/F              Last edited by Rubia Alicia on 3/14/2023 10:10 AM.            Assessment /Plan     For exam results, see Encounter Report.    Nuclear sclerosis, bilateral    Post-operative state      Slit lamp exam:  L/L: nl  K: clear, wound sealed  AC: trace cell  Iris/Lens: IOL centered and stable    POW1 s/p phaco: Surgery healing well with no signs of infection or abnormal inflammation.    Patient wishes to proceed with surgery in the second eye. Risks, benefits, alternatives reviewed. IOL selection reviewed.       Left eye  IOL: CNA0T0 25.0 (intermediate target)

## 2023-03-16 ENCOUNTER — ANESTHESIA EVENT (OUTPATIENT)
Dept: SURGERY | Facility: HOSPITAL | Age: 88
End: 2023-03-16
Payer: MEDICARE

## 2023-03-16 ENCOUNTER — TELEPHONE (OUTPATIENT)
Dept: OPHTHALMOLOGY | Facility: CLINIC | Age: 88
End: 2023-03-16
Payer: MEDICARE

## 2023-03-16 NOTE — ANESTHESIA PREPROCEDURE EVALUATION
03/16/2023  Chang Braswell is a 87 y.o., male.      Pre-op Assessment    I have reviewed the Patient Summary Reports.     I have reviewed the Nursing Notes. I have reviewed the NPO Status.   I have reviewed the Medications.     Review of Systems  Anesthesia Hx:  No problems with previous Anesthesia  Denies Family Hx of Anesthesia complications.   Denies Personal Hx of Anesthesia complications.   Hematology/Oncology:  Hematology Normal   Oncology Normal     EENT/Dental:  EENT/Dental Normal Eyes:    Cardiovascular:   Hypertension (ER visit 12/22) Valvular problems/Murmurs CAD  CABG/stent Dysrhythmias (PVC)  CHF hyperlipidemia NYHA Classification II Aortic synthetic valve regurgitation  CABG  H/o CHF  Coronary Artery Disease: S/P Aorto-Coronary Bypass Graft Surgery (CABG):  Valvular Heart Disease: Aortic Regurgitation (AR), s/p replacement, recurrent regurgitation   Hypertension    Pulmonary:  Pulmonary Normal    Renal/:   BPH    Hepatic/GI:   GERD    Musculoskeletal:   Arthritis     Neurological:   TIA, Cognitive dysfunction  Advanced age  Hearing problems   Endocrine:  Denies Diabetes  Denies Thyroid Disease   Denies Pituitary Disease        Physical Exam  General: Well nourished, Cooperative, Alert and Oriented    Airway:  Mallampati: II   Mouth Opening: Normal  TM Distance: Normal  Tongue: Normal  Neck ROM: Normal ROM        Anesthesia Plan  Type of Anesthesia, risks & benefits discussed:    Anesthesia Type: MAC  Intra-op Monitoring Plan: Standard ASA Monitors  Post Op Pain Control Plan: multimodal analgesia and IV/PO Opioids PRN  Induction:  IV  Informed Consent: Informed consent signed with the Patient and all parties understand the risks and agree with anesthesia plan.  All questions answered.   ASA Score: 3  Day of Surgery Review of History & Physical: H&P Update referred to the  surgeon/provider.I have interviewed and examined the patient. I have reviewed the patient's H&P dated: There are no significant changes. H&P completed by Anesthesiologist.  Anesthesia Plan Notes: Hearing decreased  Cognitive issues  Speak slowly and clearly to him     Ready For Surgery From Anesthesia Perspective.     .

## 2023-03-16 NOTE — TELEPHONE ENCOUNTER
Patient given arrival time of 7:00 am on Monday March 20 . Nothing to eat or drink after 9 pm.  Water, Gatorade after 9 pm until leaves home.  Start drops into the operative eye today. [address] [facility].

## 2023-03-20 ENCOUNTER — ANESTHESIA (OUTPATIENT)
Dept: SURGERY | Facility: HOSPITAL | Age: 88
End: 2023-03-20
Payer: MEDICARE

## 2023-03-20 ENCOUNTER — HOSPITAL ENCOUNTER (OUTPATIENT)
Facility: HOSPITAL | Age: 88
Discharge: HOME | End: 2023-03-20
Attending: OPHTHALMOLOGY | Admitting: OPHTHALMOLOGY
Payer: MEDICARE

## 2023-03-20 VITALS
TEMPERATURE: 98 F | WEIGHT: 170 LBS | RESPIRATION RATE: 16 BRPM | SYSTOLIC BLOOD PRESSURE: 130 MMHG | OXYGEN SATURATION: 95 % | HEIGHT: 70 IN | DIASTOLIC BLOOD PRESSURE: 57 MMHG | HEART RATE: 60 BPM | BODY MASS INDEX: 24.34 KG/M2

## 2023-03-20 DIAGNOSIS — H25.13 NUCLEAR SCLEROTIC CATARACT, BILATERAL: ICD-10-CM

## 2023-03-20 DIAGNOSIS — H25.12 NUCLEAR SCLEROTIC CATARACT OF LEFT EYE: Primary | ICD-10-CM

## 2023-03-20 PROCEDURE — D9220A PRA ANESTHESIA: Mod: CRNA,,, | Performed by: NURSE ANESTHETIST, CERTIFIED REGISTERED

## 2023-03-20 PROCEDURE — 99900035 HC TECH TIME PER 15 MIN (STAT)

## 2023-03-20 PROCEDURE — D9220A PRA ANESTHESIA: ICD-10-PCS | Mod: CRNA,,, | Performed by: NURSE ANESTHETIST, CERTIFIED REGISTERED

## 2023-03-20 PROCEDURE — 36000706: Performed by: OPHTHALMOLOGY

## 2023-03-20 PROCEDURE — D9220A PRA ANESTHESIA: ICD-10-PCS | Mod: ANES,,, | Performed by: ANESTHESIOLOGY

## 2023-03-20 PROCEDURE — D9220A PRA ANESTHESIA: Mod: ANES,,, | Performed by: ANESTHESIOLOGY

## 2023-03-20 PROCEDURE — 37000008 HC ANESTHESIA 1ST 15 MINUTES: Performed by: OPHTHALMOLOGY

## 2023-03-20 PROCEDURE — 66982 PR REMOVAL, CATARACT, W/INSRT INTRAOC LENS, W/O ENDO CYCLO, CMPLX: ICD-10-PCS | Mod: 79,LT,, | Performed by: OPHTHALMOLOGY

## 2023-03-20 PROCEDURE — 63600175 PHARM REV CODE 636 W HCPCS: Performed by: NURSE ANESTHETIST, CERTIFIED REGISTERED

## 2023-03-20 PROCEDURE — 71000015 HC POSTOP RECOV 1ST HR: Performed by: OPHTHALMOLOGY

## 2023-03-20 PROCEDURE — 37000009 HC ANESTHESIA EA ADD 15 MINS: Performed by: OPHTHALMOLOGY

## 2023-03-20 PROCEDURE — 94761 N-INVAS EAR/PLS OXIMETRY MLT: CPT

## 2023-03-20 PROCEDURE — 25000003 PHARM REV CODE 250: Performed by: OPHTHALMOLOGY

## 2023-03-20 PROCEDURE — 66982 XCAPSL CTRC RMVL CPLX WO ECP: CPT | Mod: 79,LT,, | Performed by: OPHTHALMOLOGY

## 2023-03-20 PROCEDURE — 36000707: Performed by: OPHTHALMOLOGY

## 2023-03-20 PROCEDURE — V2632 POST CHMBR INTRAOCULAR LENS: HCPCS | Performed by: OPHTHALMOLOGY

## 2023-03-20 DEVICE — LENS CLAREON AUTONOME 25.0D: Type: IMPLANTABLE DEVICE | Site: EYE | Status: FUNCTIONAL

## 2023-03-20 RX ORDER — PHENYLEPHRINE HYDROCHLORIDE 25 MG/ML
1 SOLUTION/ DROPS OPHTHALMIC
Status: COMPLETED | OUTPATIENT
Start: 2023-03-20 | End: 2023-03-20

## 2023-03-20 RX ORDER — MOXIFLOXACIN 5 MG/ML
1 SOLUTION/ DROPS OPHTHALMIC
Status: COMPLETED | OUTPATIENT
Start: 2023-03-20 | End: 2023-03-20

## 2023-03-20 RX ORDER — TROPICAMIDE 10 MG/ML
1 SOLUTION/ DROPS OPHTHALMIC
Status: COMPLETED | OUTPATIENT
Start: 2023-03-20 | End: 2023-03-20

## 2023-03-20 RX ORDER — PHENYLEPHRINE HYDROCHLORIDE 100 MG/ML
1 SOLUTION/ DROPS OPHTHALMIC
Status: DISCONTINUED | OUTPATIENT
Start: 2023-03-20 | End: 2023-03-20 | Stop reason: HOSPADM

## 2023-03-20 RX ORDER — LIDOCAINE HYDROCHLORIDE 40 MG/ML
INJECTION, SOLUTION RETROBULBAR
Status: DISCONTINUED | OUTPATIENT
Start: 2023-03-20 | End: 2023-03-20 | Stop reason: HOSPADM

## 2023-03-20 RX ORDER — MOXIFLOXACIN 5 MG/ML
SOLUTION/ DROPS OPHTHALMIC
Status: DISCONTINUED | OUTPATIENT
Start: 2023-03-20 | End: 2023-03-20 | Stop reason: HOSPADM

## 2023-03-20 RX ORDER — MIDAZOLAM HYDROCHLORIDE 1 MG/ML
INJECTION INTRAMUSCULAR; INTRAVENOUS
Status: DISCONTINUED | OUTPATIENT
Start: 2023-03-20 | End: 2023-03-20

## 2023-03-20 RX ORDER — TETRACAINE HYDROCHLORIDE 5 MG/ML
1 SOLUTION OPHTHALMIC
Status: COMPLETED | OUTPATIENT
Start: 2023-03-20 | End: 2023-03-20

## 2023-03-20 RX ORDER — PROPARACAINE HYDROCHLORIDE 5 MG/ML
1 SOLUTION/ DROPS OPHTHALMIC
Status: DISCONTINUED | OUTPATIENT
Start: 2023-03-20 | End: 2023-03-20 | Stop reason: HOSPADM

## 2023-03-20 RX ORDER — ACETAMINOPHEN 325 MG/1
650 TABLET ORAL EVERY 4 HOURS PRN
Status: DISCONTINUED | OUTPATIENT
Start: 2023-03-20 | End: 2023-03-20 | Stop reason: HOSPADM

## 2023-03-20 RX ORDER — FENTANYL CITRATE 50 UG/ML
INJECTION, SOLUTION INTRAMUSCULAR; INTRAVENOUS
Status: DISCONTINUED | OUTPATIENT
Start: 2023-03-20 | End: 2023-03-20

## 2023-03-20 RX ORDER — TETRACAINE HYDROCHLORIDE 5 MG/ML
SOLUTION OPHTHALMIC
Status: DISCONTINUED | OUTPATIENT
Start: 2023-03-20 | End: 2023-03-20 | Stop reason: HOSPADM

## 2023-03-20 RX ADMIN — MOXIFLOXACIN OPHTHALMIC 1 DROP: 5 SOLUTION/ DROPS OPHTHALMIC at 07:03

## 2023-03-20 RX ADMIN — PHENYLEPHRINE HYDROCHLORIDE 1 DROP: 25 SOLUTION/ DROPS OPHTHALMIC at 07:03

## 2023-03-20 RX ADMIN — MOXIFLOXACIN 1 DROP: 5 SOLUTION/ DROPS OPHTHALMIC at 09:03

## 2023-03-20 RX ADMIN — TETRACAINE HYDROCHLORIDE 1 DROP: 5 SOLUTION OPHTHALMIC at 07:03

## 2023-03-20 RX ADMIN — MIDAZOLAM HYDROCHLORIDE 1 MG: 1 INJECTION, SOLUTION INTRAMUSCULAR; INTRAVENOUS at 09:03

## 2023-03-20 RX ADMIN — FENTANYL CITRATE 50 MCG: 50 INJECTION, SOLUTION INTRAMUSCULAR; INTRAVENOUS at 09:03

## 2023-03-20 RX ADMIN — TROPICAMIDE 1 DROP: 10 SOLUTION/ DROPS OPHTHALMIC at 07:03

## 2023-03-20 NOTE — OP NOTE
SURGEON:  Brian Davis M.D.    PREOPERATIVE DIAGNOSIS:    Nuclear Sclerotic Cataract    POSTOPERATIVE DIAGNOSIS:    Nuclear Sclerotic Cataract  IFIS    PROCEDURES:    Complex Phacoemulsification with  intraocular lens, left eye (49272)    DATE OF SURGERY: 03/20/2023    IMPLANT: CNA0T0 25.0    ANESTHESIA:  MAC with topical Lidocaine    COMPLICATIONS:  None    ESTIMATED BLOOD LOSS: None    SPECIMENS: None    INDICATIONS:    The patient has a history of painless progressive visual loss and difficulty with activities of daily living, which specifically include difficult driving at night due to glare and difficulty reading small print, secondary to cataract formation. After a thorough discussion of the risks, benefits, and alternatives to cataract surgery, including, but not limited to, the rare risks of infection, retinal detachment, hemorrhage, need for additional surgery, loss of vision, and even loss of the eye, the patient voices understanding and desires to proceed.    DESCRIPTION OF PROCEDURE:    The patients IOL calculations were reviewed, and the lens selection confirmed.   After verification and marking of the proper eye in the preop holding area, the patient was brought to the operating room in supine position where the eye was prepped and draped in standard sterile fashion with 5% Betadine and a lid speculum placed in the eye.   Topical 4% Lidocaine was used in addition to the preoperative anesthesia and the procedure was begun by the creation of a paracentesis incision through which viscoelastic was used to fill the anterior chamber.  Next, a keratome blade was used to create a triplanar temporal clear corneal incision and a cystotome and Utrata forceps used to fashion a continuous curvilinear capsulorrhexis.  Hydrodissection was carried out using the Mandujano hydrodissection cannula and the nucleus was found to be mobile.  Phacoemulsification of the nucleus was carried out using a quick chop technique, and all  remaining epinuclear and cortical material was removed.  The eye was then reformed with Viscoelastic and the  intraocular lens was implanted into the capsular bag.  All remaining viscoelastics were removed from the eye and at the end of the case the pupil was round, the lens was well-centered within the capsular bag and all wounds were found to be water tight.  Drops of Vigamox and Pred Forte were instilled and a shield was placed over the eye. The patient will follow up with Dr. Davis in the morning.    ADDENDUM: Because the patient had a small pupil, a pupil dilating ring was used to enlarge the pupillary aperture and prevent complications.  ADDENDUM: Because the patient has a history of Flomax use, low flow settings, intraoperative epinephrine, special viscoelastics, and a pupil dilating ring were used to prevent complications from Intraoperative Floppy Iris Syndrome (IFIS).

## 2023-03-20 NOTE — PLAN OF CARE
Pre op admission complete. H and p and update needed. Anesthesia consent needed. Call light in reach. Verb understanding.

## 2023-03-20 NOTE — ANESTHESIA POSTPROCEDURE EVALUATION
Anesthesia Post Evaluation    [facility]    Procedure(s) Performed: Procedure(s) (LRB):  EXTRACTION, CATARACT, WITH IOL INSERTION (Left)    Final Anesthesia Type: MAC      Patient location during evaluation: PACU  Patient participation: Yes- Able to Participate  Level of consciousness: awake and alert  Post-procedure vital signs: reviewed and not stable  Pain management: adequate  Airway patency: patent    PONV status at discharge: No PONV  Anesthetic complications: no      Cardiovascular status: blood pressure returned to baseline  Respiratory status: unassisted and spontaneous ventilation  Hydration status: euvolemic  Follow-up not needed.          Vitals Value Taken Time   /57 03/20/23 0948        Pulse 61 03/20/23 0955   Resp 16 03/20/23 0952   SpO2 96 % 03/20/23 0955   Vitals shown include unvalidated device data.      No case tracking events are documented in the log.      Pain/Tin Score: Tin Score: 10 (3/20/2023  9:34 AM)

## 2023-03-20 NOTE — TRANSFER OF CARE
"Anesthesia Transfer of Care Note    Patient: Chang Braswell    Procedure(s) Performed: Procedure(s) (LRB):  EXTRACTION, CATARACT, WITH IOL INSERTION (Left)    Patient location: [facility]    Anesthesia Type: MAC    Transport from OR: Transported from OR on room air with adequate spontaneous ventilation    Post pain: adequate analgesia    Post assessment: no apparent anesthetic complications and tolerated procedure well    Post vital signs: stable    Level of consciousness: awake and alert    Complications: none    Transfer of care protocol was followed      Last vitals:   Visit Vitals  BP (!) 151/73 (BP Location: Right arm, Patient Position: Sitting)   Pulse 71   Temp 36.3 °C (97.3 °F) (Temporal)   Resp 16   Ht 5' 10" (1.778 m)   Wt 77.1 kg (170 lb)   SpO2 96%   BMI 24.39 kg/m²     "

## 2023-03-20 NOTE — DISCHARGE SUMMARY
Outcome: Successful outpatient ophthalmic surgical procedure  Preprinted Instructions given to patient.  Regular diet.  Activity: No restrictions  Meds: see Med Rec  Condition: stable  Follow up: 1 day with Dr Davis  Disposition: Home  Diagnosis: s/p eye surgery  Date of discharge: 03/20/2023

## 2023-03-21 ENCOUNTER — OFFICE VISIT (OUTPATIENT)
Dept: OPHTHALMOLOGY | Facility: CLINIC | Age: 88
End: 2023-03-21
Payer: MEDICARE

## 2023-03-21 DIAGNOSIS — Z98.890 POST-OPERATIVE STATE: Primary | ICD-10-CM

## 2023-03-21 DIAGNOSIS — H25.13 NUCLEAR SCLEROTIC CATARACT, BILATERAL: ICD-10-CM

## 2023-03-21 PROCEDURE — 1101F PT FALLS ASSESS-DOCD LE1/YR: CPT | Mod: CPTII,S$GLB,, | Performed by: OPHTHALMOLOGY

## 2023-03-21 PROCEDURE — 99024 POSTOP FOLLOW-UP VISIT: CPT | Mod: S$GLB,,, | Performed by: OPHTHALMOLOGY

## 2023-03-21 PROCEDURE — 3288F PR FALLS RISK ASSESSMENT DOCUMENTED: ICD-10-PCS | Mod: CPTII,S$GLB,, | Performed by: OPHTHALMOLOGY

## 2023-03-21 PROCEDURE — 1126F PR PAIN SEVERITY QUANTIFIED, NO PAIN PRESENT: ICD-10-PCS | Mod: CPTII,S$GLB,, | Performed by: OPHTHALMOLOGY

## 2023-03-21 PROCEDURE — 1160F PR REVIEW ALL MEDS BY PRESCRIBER/CLIN PHARMACIST DOCUMENTED: ICD-10-PCS | Mod: CPTII,S$GLB,, | Performed by: OPHTHALMOLOGY

## 2023-03-21 PROCEDURE — 99999 PR PBB SHADOW E&M-EST. PATIENT-LVL III: ICD-10-PCS | Mod: PBBFAC,,, | Performed by: OPHTHALMOLOGY

## 2023-03-21 PROCEDURE — 3288F FALL RISK ASSESSMENT DOCD: CPT | Mod: CPTII,S$GLB,, | Performed by: OPHTHALMOLOGY

## 2023-03-21 PROCEDURE — 99024 PR POST-OP FOLLOW-UP VISIT: ICD-10-PCS | Mod: S$GLB,,, | Performed by: OPHTHALMOLOGY

## 2023-03-21 PROCEDURE — 1159F PR MEDICATION LIST DOCUMENTED IN MEDICAL RECORD: ICD-10-PCS | Mod: CPTII,S$GLB,, | Performed by: OPHTHALMOLOGY

## 2023-03-21 PROCEDURE — 99999 PR PBB SHADOW E&M-EST. PATIENT-LVL III: CPT | Mod: PBBFAC,,, | Performed by: OPHTHALMOLOGY

## 2023-03-21 PROCEDURE — 1160F RVW MEDS BY RX/DR IN RCRD: CPT | Mod: CPTII,S$GLB,, | Performed by: OPHTHALMOLOGY

## 2023-03-21 PROCEDURE — 1159F MED LIST DOCD IN RCRD: CPT | Mod: CPTII,S$GLB,, | Performed by: OPHTHALMOLOGY

## 2023-03-21 PROCEDURE — 1101F PR PT FALLS ASSESS DOC 0-1 FALLS W/OUT INJ PAST YR: ICD-10-PCS | Mod: CPTII,S$GLB,, | Performed by: OPHTHALMOLOGY

## 2023-03-21 PROCEDURE — 1126F AMNT PAIN NOTED NONE PRSNT: CPT | Mod: CPTII,S$GLB,, | Performed by: OPHTHALMOLOGY

## 2023-03-21 NOTE — PROGRESS NOTES
HPI    1 day po phaco/IOL OS    Vision is good and no pain  PGB TID OS    DATE OF SURGERY: 03/20/2023     IMPLANT: CNA0T0 25.0  Last edited by Taylor Buck on 3/21/2023  8:34 AM.            Assessment /Plan     For exam results, see Encounter Report.    Post-operative state    Nuclear sclerotic cataract, bilateral      Slit lamp exam:  L/L: nl  K: clear, wound sealed  AC: 1+ cell  Lens: IOL centered and stable    POD1 s/p Phaco/IOL  Appropriate precautions and post op medications reviewed.  Patient instructed to call or come in if symptoms of redness, decreased vision, or pain are experienced.

## 2023-04-20 ENCOUNTER — OFFICE VISIT (OUTPATIENT)
Dept: OPTOMETRY | Facility: CLINIC | Age: 88
End: 2023-04-20
Payer: MEDICARE

## 2023-04-20 DIAGNOSIS — Z98.42 S/P BILATERAL CATARACT EXTRACTION: Primary | ICD-10-CM

## 2023-04-20 DIAGNOSIS — Z98.41 S/P BILATERAL CATARACT EXTRACTION: Primary | ICD-10-CM

## 2023-04-20 PROCEDURE — 1159F MED LIST DOCD IN RCRD: CPT | Mod: CPTII,S$GLB,, | Performed by: OPTOMETRIST

## 2023-04-20 PROCEDURE — 99024 POSTOP FOLLOW-UP VISIT: CPT | Mod: S$GLB,,, | Performed by: OPTOMETRIST

## 2023-04-20 PROCEDURE — 1101F PR PT FALLS ASSESS DOC 0-1 FALLS W/OUT INJ PAST YR: ICD-10-PCS | Mod: CPTII,S$GLB,, | Performed by: OPTOMETRIST

## 2023-04-20 PROCEDURE — 1159F PR MEDICATION LIST DOCUMENTED IN MEDICAL RECORD: ICD-10-PCS | Mod: CPTII,S$GLB,, | Performed by: OPTOMETRIST

## 2023-04-20 PROCEDURE — 3288F PR FALLS RISK ASSESSMENT DOCUMENTED: ICD-10-PCS | Mod: CPTII,S$GLB,, | Performed by: OPTOMETRIST

## 2023-04-20 PROCEDURE — 1101F PT FALLS ASSESS-DOCD LE1/YR: CPT | Mod: CPTII,S$GLB,, | Performed by: OPTOMETRIST

## 2023-04-20 PROCEDURE — 3288F FALL RISK ASSESSMENT DOCD: CPT | Mod: CPTII,S$GLB,, | Performed by: OPTOMETRIST

## 2023-04-20 PROCEDURE — 99999 PR PBB SHADOW E&M-EST. PATIENT-LVL III: ICD-10-PCS | Mod: PBBFAC,,, | Performed by: OPTOMETRIST

## 2023-04-20 PROCEDURE — 1126F PR PAIN SEVERITY QUANTIFIED, NO PAIN PRESENT: ICD-10-PCS | Mod: CPTII,S$GLB,, | Performed by: OPTOMETRIST

## 2023-04-20 PROCEDURE — 99024 PR POST-OP FOLLOW-UP VISIT: ICD-10-PCS | Mod: S$GLB,,, | Performed by: OPTOMETRIST

## 2023-04-20 PROCEDURE — 99999 PR PBB SHADOW E&M-EST. PATIENT-LVL III: CPT | Mod: PBBFAC,,, | Performed by: OPTOMETRIST

## 2023-04-20 PROCEDURE — 1126F AMNT PAIN NOTED NONE PRSNT: CPT | Mod: CPTII,S$GLB,, | Performed by: OPTOMETRIST

## 2023-04-20 NOTE — PROGRESS NOTES
HPI    Patient presents for a one month P/O. Patient notes vision as stable.   Patient denies eye pain.   Pt is no longer on drops   Patient has monovision OD - Distance, OS - Near    OD: 03/06/2023  IMPLANT: CNA0T0 23.0     OS: 03/20/2023  IMPLANT: CNA0T0 25.0  Last edited by Chong Ingram on 4/20/2023  9:39 AM.            Assessment /Plan     For exam results, see Encounter Report.    S/P bilateral cataract extraction  -     OCT- Retina            Pt doing well.  No rx given.  No CME OU.  Retina flat and intact OU--no holes, tears, breaks, or RDs.  Return to yearly eye exams.

## 2024-04-03 ENCOUNTER — TELEPHONE (OUTPATIENT)
Dept: CARDIOLOGY | Facility: CLINIC | Age: 89
End: 2024-04-03
Payer: MEDICARE

## 2024-04-03 DIAGNOSIS — R06.09 DOE (DYSPNEA ON EXERTION): ICD-10-CM

## 2024-04-03 DIAGNOSIS — Z95.2 S/P AVR: ICD-10-CM

## 2024-04-03 DIAGNOSIS — I38 VALVULAR HEART DISEASE: ICD-10-CM

## 2024-04-03 DIAGNOSIS — R53.83 FATIGUE, UNSPECIFIED TYPE: ICD-10-CM

## 2024-04-03 DIAGNOSIS — I50.41 ACUTE COMBINED SYSTOLIC AND DIASTOLIC HEART FAILURE: ICD-10-CM

## 2024-04-03 DIAGNOSIS — I50.20 HEART FAILURE WITH REDUCED EJECTION FRACTION: Primary | ICD-10-CM

## 2024-04-03 DIAGNOSIS — Z95.1 S/P CABG (CORONARY ARTERY BYPASS GRAFT): Primary | ICD-10-CM

## 2024-04-03 DIAGNOSIS — R60.9 EDEMA, UNSPECIFIED TYPE: ICD-10-CM

## 2024-04-03 NOTE — TELEPHONE ENCOUNTER
----- Message from Devorah Zaragoza RN sent at 4/3/2024  2:14 PM CDT -----  Regarding: FW: Aortic Valve Issues  Pt's wife/ ex wife (MD ) stated that pt has been having LAUREN, edema, fatigue and hx of leaking valve. She would like for him to see dr Tobin and accepted appt w. dr Elaine on dr Tobin's scheduled consult day + EKG. She will address cancelling her appt w. dr PATRICIA Elaine at [place] or this one.  She would like to know if pt should be scheduled for echo prior to appt.    Please advise,    ----- Message -----  From: Nida Santoyo MA  Sent: 4/3/2024   1:33 PM CDT  To: Devorah Zaragoza RN  Subject: FW: Aortic Vavle Problems                          ----- Message -----  From: Isak Gibbs  Sent: 4/3/2024  11:36 AM CDT  To: Mahad [facility] Staff  Subject: Aortic Vavle Problems                            Morning,    Pt would like a appt, wife feels pt is having issues with his aortic valve and is experiencing mild symptoms like fatigue, some edema. Pt is not hurting or anything, they just need a check up.    Would like a callback.    Contact @ [phone]    Thanks

## 2024-04-03 NOTE — TELEPHONE ENCOUNTER
Dr Tobin briefly updated and gave me options to schedule pt and ordered an echo. Spoke w. wife/ ex-wife and pt was scheduled for echo on 4/4 and appt on 4/5. She agreed to date/time of appointment(s).

## 2024-04-04 ENCOUNTER — HOSPITAL ENCOUNTER (OUTPATIENT)
Dept: CARDIOLOGY | Facility: HOSPITAL | Age: 89
Discharge: HOME | End: 2024-04-04
Attending: INTERNAL MEDICINE
Payer: MEDICARE

## 2024-04-04 VITALS
HEART RATE: 62 BPM | HEIGHT: 70 IN | SYSTOLIC BLOOD PRESSURE: 120 MMHG | BODY MASS INDEX: 24.34 KG/M2 | WEIGHT: 170 LBS | DIASTOLIC BLOOD PRESSURE: 85 MMHG

## 2024-04-04 DIAGNOSIS — I50.20 HEART FAILURE WITH REDUCED EJECTION FRACTION: ICD-10-CM

## 2024-04-04 DIAGNOSIS — R06.09 DOE (DYSPNEA ON EXERTION): ICD-10-CM

## 2024-04-04 DIAGNOSIS — I38 VALVULAR HEART DISEASE: ICD-10-CM

## 2024-04-04 DIAGNOSIS — R60.9 EDEMA, UNSPECIFIED TYPE: ICD-10-CM

## 2024-04-04 LAB
ASCENDING AORTA: 3.36 CM
AV INDEX (PROSTH): 0.22
AV MEAN GRADIENT: 14 MMHG
AV PEAK GRADIENT: 28 MMHG
AV VALVE AREA BY VELOCITY RATIO: 0.93 CM²
AV VALVE AREA: 0.98 CM²
AV VELOCITY RATIO: 0.21
BSA FOR ECHO PROCEDURE: 1.95 M2
CV ECHO LV RWT: 0.39 CM
DOP CALC AO PEAK VEL: 2.63 M/S
DOP CALC AO VTI: 46.88 CM
DOP CALC LVOT AREA: 4.5 CM2
DOP CALC LVOT DIAMETER: 2.4 CM
DOP CALC LVOT PEAK VEL: 0.54 M/S
DOP CALC LVOT STROKE VOLUME: 46.17 CM3
DOP CALCLVOT PEAK VEL VTI: 10.21 CM
E/E' RATIO: 16.43 M/S
ECHO LV POSTERIOR WALL: 1.01 CM (ref 0.6–1.1)
FRACTIONAL SHORTENING: 18 % (ref 28–44)
INTERVENTRICULAR SEPTUM: 1.32 CM (ref 0.6–1.1)
LA MAJOR: 6.15 CM
LA MINOR: 5.95 CM
LA WIDTH: 5.14 CM
LEFT ATRIUM SIZE: 4.37 CM
LEFT ATRIUM VOLUME INDEX MOD: 59.3 ML/M2
LEFT ATRIUM VOLUME INDEX: 59.2 ML/M2
LEFT ATRIUM VOLUME MOD: 115.68 CM3
LEFT ATRIUM VOLUME: 115.48 CM3
LEFT INTERNAL DIMENSION IN SYSTOLE: 4.24 CM (ref 2.1–4)
LEFT VENTRICLE DIASTOLIC VOLUME INDEX: 66.07 ML/M2
LEFT VENTRICLE DIASTOLIC VOLUME: 128.83 ML
LEFT VENTRICLE MASS INDEX: 122 G/M2
LEFT VENTRICLE SYSTOLIC VOLUME INDEX: 41.3 ML/M2
LEFT VENTRICLE SYSTOLIC VOLUME: 80.55 ML
LEFT VENTRICULAR INTERNAL DIMENSION IN DIASTOLE: 5.19 CM (ref 3.5–6)
LEFT VENTRICULAR MASS: 238.1 G
LV LATERAL E/E' RATIO: 10.45 M/S
LV SEPTAL E/E' RATIO: 38.33 M/S
MV PEAK E VEL: 1.15 M/S
OHS LV EJECTION FRACTION SIMPSONS BIPLANE MOD: 33 %
PISA MRMAX VEL: 0.05 M/S
PISA TR MAX VEL: 3.45 M/S
RA MAJOR: 6.05 CM
RA PRESSURE ESTIMATED: 15 MMHG
RA WIDTH: 5.17 CM
RIGHT VENTRICULAR END-DIASTOLIC DIMENSION: 5.31 CM
RV TB RVSP: 18 MMHG
SINUS: 4.21 CM
STJ: 3.37 CM
TDI LATERAL: 0.11 M/S
TDI SEPTAL: 0.03 M/S
TDI: 0.07 M/S
TR MAX PG: 48 MMHG
TRICUSPID ANNULAR PLANE SYSTOLIC EXCURSION: 1.7 CM
TV REST PULMONARY ARTERY PRESSURE: 63 MMHG
Z-SCORE OF LEFT VENTRICULAR DIMENSION IN END DIASTOLE: -0.69
Z-SCORE OF LEFT VENTRICULAR DIMENSION IN END SYSTOLE: 1.7

## 2024-04-04 PROCEDURE — 93306 TTE W/DOPPLER COMPLETE: CPT | Mod: 26,,, | Performed by: INTERNAL MEDICINE

## 2024-04-04 PROCEDURE — 93306 TTE W/DOPPLER COMPLETE: CPT

## 2024-04-04 RX ORDER — FUROSEMIDE 40 MG/1
40 TABLET ORAL 2 TIMES DAILY
Qty: 60 TABLET | Refills: 5 | Status: SHIPPED | OUTPATIENT
Start: 2024-04-04 | End: 2024-10-01

## 2024-04-05 ENCOUNTER — OFFICE VISIT (OUTPATIENT)
Dept: CARDIOLOGY | Facility: CLINIC | Age: 89
End: 2024-04-05
Payer: MEDICARE

## 2024-04-05 ENCOUNTER — HOSPITAL ENCOUNTER (OUTPATIENT)
Dept: CARDIOLOGY | Facility: CLINIC | Age: 89
Discharge: HOME | End: 2024-04-05
Payer: MEDICARE

## 2024-04-05 VITALS
HEIGHT: 70 IN | OXYGEN SATURATION: 99 % | DIASTOLIC BLOOD PRESSURE: 70 MMHG | BODY MASS INDEX: 24.37 KG/M2 | WEIGHT: 170.19 LBS | HEART RATE: 55 BPM | SYSTOLIC BLOOD PRESSURE: 120 MMHG

## 2024-04-05 DIAGNOSIS — I50.20 HEART FAILURE WITH REDUCED EJECTION FRACTION: Primary | ICD-10-CM

## 2024-04-05 DIAGNOSIS — I38 VALVULAR HEART DISEASE: ICD-10-CM

## 2024-04-05 DIAGNOSIS — I50.20 HEART FAILURE WITH REDUCED EJECTION FRACTION: ICD-10-CM

## 2024-04-05 DIAGNOSIS — I25.810 CORONARY ARTERY DISEASE INVOLVING CORONARY BYPASS GRAFT OF NATIVE HEART WITHOUT ANGINA PECTORIS: ICD-10-CM

## 2024-04-05 DIAGNOSIS — R06.09 DOE (DYSPNEA ON EXERTION): ICD-10-CM

## 2024-04-05 DIAGNOSIS — R60.9 EDEMA, UNSPECIFIED TYPE: ICD-10-CM

## 2024-04-05 DIAGNOSIS — Z95.2 S/P AORTIC VALVE REPLACEMENT: ICD-10-CM

## 2024-04-05 DIAGNOSIS — I50.41 ACUTE COMBINED SYSTOLIC AND DIASTOLIC HEART FAILURE: ICD-10-CM

## 2024-04-05 DIAGNOSIS — E78.2 MIXED HYPERLIPIDEMIA: ICD-10-CM

## 2024-04-05 LAB
OHS QRS DURATION: 160 MS
OHS QTC CALCULATION: 529 MS

## 2024-04-05 PROCEDURE — 1126F AMNT PAIN NOTED NONE PRSNT: CPT | Mod: CPTII,S$GLB,, | Performed by: INTERNAL MEDICINE

## 2024-04-05 PROCEDURE — 1101F PT FALLS ASSESS-DOCD LE1/YR: CPT | Mod: CPTII,S$GLB,, | Performed by: INTERNAL MEDICINE

## 2024-04-05 PROCEDURE — 3288F FALL RISK ASSESSMENT DOCD: CPT | Mod: CPTII,S$GLB,, | Performed by: INTERNAL MEDICINE

## 2024-04-05 PROCEDURE — 1160F RVW MEDS BY RX/DR IN RCRD: CPT | Mod: CPTII,S$GLB,, | Performed by: INTERNAL MEDICINE

## 2024-04-05 PROCEDURE — 93010 ELECTROCARDIOGRAM REPORT: CPT | Mod: S$GLB,,, | Performed by: INTERNAL MEDICINE

## 2024-04-05 PROCEDURE — 93005 ELECTROCARDIOGRAM TRACING: CPT | Mod: S$GLB,,, | Performed by: INTERNAL MEDICINE

## 2024-04-05 PROCEDURE — 99999 PR PBB SHADOW E&M-EST. PATIENT-LVL V: CPT | Mod: PBBFAC,,, | Performed by: INTERNAL MEDICINE

## 2024-04-05 PROCEDURE — 99214 OFFICE O/P EST MOD 30 MIN: CPT | Mod: S$GLB,,, | Performed by: INTERNAL MEDICINE

## 2024-04-05 PROCEDURE — 1159F MED LIST DOCD IN RCRD: CPT | Mod: CPTII,S$GLB,, | Performed by: INTERNAL MEDICINE

## 2024-04-05 RX ORDER — LORATADINE 10 MG/1
10 TABLET ORAL DAILY
COMMUNITY

## 2024-04-05 RX ORDER — OMEPRAZOLE 20 MG/1
20 TABLET, DELAYED RELEASE ORAL DAILY
COMMUNITY

## 2024-04-05 NOTE — PROGRESS NOTES
Subjective:   Patient ID:  Chang Morillo is a 88 y.o. male who presents for follow up of Shortness of Breath      HPI: Here urgently after noticing more dyspnea with weight gain and leg swelling and wheezing for the last month.  Yesterday, I got him an echo, noted a plethoric IVC, and prescribed lasix 40 bid.  He had been naive to that medicine.    Since, he's lost 5-7 lbs with copious urine output.         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    Decreased hearing    S/P aortic valve replacement (bioprosthetic)    Aortic prosthetic valve regurgitation    Unspecified symptoms and signs involving cognitive functions and awareness    Essential hypertension    MCI (mild cognitive impairment)    Fatigue    Hypertensive urgency    Mixed hyperlipidemia    Nuclear sclerotic cataract of left eye    Heart failure with reduced ejection fraction    Acute combined systolic and diastolic heart failure       Current Outpatient Medications   Medication Sig    amLODIPine (NORVASC) 10 MG tablet Take 1 tablet (10 mg total) by mouth once daily.    amoxicillin (AMOXIL) 500 MG capsule     ascorbic acid, vitamin C, (VITAMIN C) 1000 MG tablet Take 1,000 mg by mouth 2 (two) times a day.    aspirin 81 mg Cap Take 81 mg/kg/day by mouth.    co-enzyme Q-10 30 mg capsule Take 3 capsules (90 mg total) by mouth 2 (two) times daily.    finasteride (PROSCAR) 5 mg tablet     furosemide (LASIX) 40 MG tablet Take 1 tablet (40 mg total) by mouth 2 (two) times daily.    ID NOW COVID-19 TEST KIT Kit TEST AS DIRECTED TODAY    ketoconazole (NIZORAL) 2 % shampoo Wash scalp with medicated shampoo at least 2x/week. Let sit on scalp at least 5 minutes prior to rinsing    Lactobacillus acidophilus (PROBIOTIC) 10 billion cell Cap Take 100 tablets by mouth 2 (two)  times a day.    loratadine (CLARITIN) 10 mg tablet Take 10 mg by mouth once daily.    losartan (COZAAR) 50 MG tablet Take 50 mg by mouth once daily.    omeprazole (PRILOSEC OTC) 20 MG tablet Take 20 mg by mouth once daily.    omeprazole (PRILOSEC) 40 MG capsule Take 40 mg by mouth.    turmeric 400 mg Cap Take by mouth daily 2 hours after breakfast.    atorvastatin (LIPITOR) 80 MG tablet Take 1 tablet (80 mg total) by mouth once daily.    prednisolon/gatiflox/bromfenac (PREDNISOL ACE-GATIFLOX-BROMFEN) 1-0.5-0.075 % DrpS Apply 1 drop to eye 3 (three) times daily. in operative eye for 1 month after surgery     No current facility-administered medications for this visit.       ROS  The review of systems is negative except as above.     Objective:   Physical Exam  Vitals reviewed.   Constitutional:       Appearance: He is well-developed.   HENT:      Head: Normocephalic and atraumatic.   Eyes:      General: No scleral icterus.     Conjunctiva/sclera: Conjunctivae normal.   Neck:      Vascular: No JVD.   Cardiovascular:      Rate and Rhythm: Normal rate and regular rhythm.      Pulses: Intact distal pulses.      Heart sounds: Murmur (only a brief soft systolic murmur at the base) heard.      No friction rub. No gallop.   Pulmonary:      Effort: Pulmonary effort is normal.      Breath sounds: Normal breath sounds. No wheezing or rales.   Abdominal:      General: Bowel sounds are normal. There is no distension.      Palpations: Abdomen is soft.      Tenderness: There is no abdominal tenderness.   Musculoskeletal:         General: Swelling (1-2+ pretibial edema bilaterally) present. Normal range of motion.      Cervical back: Normal range of motion and neck supple.   Skin:     General: Skin is warm and dry.      Findings: No erythema or rash.   Neurological:      Mental Status: He is alert and oriented to person, place, and time.   Psychiatric:         Behavior: Behavior normal.         Thought Content: Thought  "content normal.         Judgment: Judgment normal.     Lab Results   Component Value Date    WBC 5.13 10/01/2022    HGB 16.0 10/01/2022    HCT 48.8 10/01/2022    MCV 88 10/01/2022     10/01/2022         Chemistry        Component Value Date/Time     (L) 10/01/2022 1845    K 4.5 10/01/2022 1845    CL 98 10/01/2022 1845    CO2 26 10/01/2022 1845    BUN 19 10/01/2022 1845    CREATININE 0.8 10/01/2022 1845     10/01/2022 1845        Component Value Date/Time    CALCIUM 10.2 10/01/2022 1845    ALKPHOS 82 10/01/2022 1845    AST 20 10/01/2022 1845    ALT 14 10/01/2022 1845    BILITOT 0.6 10/01/2022 1845    ESTGFRAFRICA >60.0 06/01/2021 1237    EGFRNONAA >60.0 06/01/2021 1237            Lab Results   Component Value Date    CHOL 223 (H) 06/15/2016     Lab Results   Component Value Date    HDL 61 06/15/2016     Lab Results   Component Value Date    LDLCALC 134.8 06/15/2016     Lab Results   Component Value Date    TRIG 136 06/15/2016     Lab Results   Component Value Date    CHOLHDL 27.4 06/15/2016       Lab Results   Component Value Date    TSH 1.405 10/22/2018       No results found for: "HGBA1C"    Assessment:     1. Heart failure with reduced ejection fraction    2. Acute combined systolic and diastolic heart failure    3. 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 Mixed hyperlipidemia    5. S/P aortic valve replacement (bioprosthetic)        Plan:     Continue current medicines. Dial back lasix to once daily after another day.     Diet/exercise goals reinforced.    F/U 4 weeks            "

## 2024-04-08 ENCOUNTER — LAB VISIT (OUTPATIENT)
Dept: LAB | Facility: HOSPITAL | Age: 89
End: 2024-04-08
Attending: INTERNAL MEDICINE
Payer: MEDICARE

## 2024-04-08 DIAGNOSIS — I50.41 ACUTE COMBINED SYSTOLIC AND DIASTOLIC HEART FAILURE: ICD-10-CM

## 2024-04-08 LAB
ALBUMIN SERPL BCP-MCNC: 3.9 G/DL (ref 3.5–5.2)
ALP SERPL-CCNC: 124 U/L (ref 55–135)
ALT SERPL W/O P-5'-P-CCNC: 24 U/L (ref 10–44)
ANION GAP SERPL CALC-SCNC: 10 MMOL/L (ref 8–16)
AST SERPL-CCNC: 27 U/L (ref 10–40)
BASOPHILS # BLD AUTO: 0.09 K/UL (ref 0–0.2)
BASOPHILS NFR BLD: 1.4 % (ref 0–1.9)
BILIRUB SERPL-MCNC: 1.1 MG/DL (ref 0.1–1)
BUN SERPL-MCNC: 20 MG/DL (ref 8–23)
CALCIUM SERPL-MCNC: 10.4 MG/DL (ref 8.7–10.5)
CHLORIDE SERPL-SCNC: 96 MMOL/L (ref 95–110)
CHOLEST SERPL-MCNC: 157 MG/DL (ref 120–199)
CHOLEST/HDLC SERPL: 2.6 {RATIO} (ref 2–5)
CO2 SERPL-SCNC: 29 MMOL/L (ref 23–29)
CREAT SERPL-MCNC: 1.1 MG/DL (ref 0.5–1.4)
DIFFERENTIAL METHOD BLD: ABNORMAL
EOSINOPHIL # BLD AUTO: 0.5 K/UL (ref 0–0.5)
EOSINOPHIL NFR BLD: 8.5 % (ref 0–8)
ERYTHROCYTE [DISTWIDTH] IN BLOOD BY AUTOMATED COUNT: 14 % (ref 11.5–14.5)
EST. GFR  (NO RACE VARIABLE): >60 ML/MIN/1.73 M^2
GLUCOSE SERPL-MCNC: 92 MG/DL (ref 70–110)
HCT VFR BLD AUTO: 49.2 % (ref 40–54)
HDLC SERPL-MCNC: 60 MG/DL (ref 40–75)
HDLC SERPL: 38.2 % (ref 20–50)
HGB BLD-MCNC: 16.2 G/DL (ref 14–18)
IMM GRANULOCYTES # BLD AUTO: 0.01 K/UL (ref 0–0.04)
IMM GRANULOCYTES NFR BLD AUTO: 0.2 % (ref 0–0.5)
LDLC SERPL CALC-MCNC: 85.8 MG/DL (ref 63–159)
LYMPHOCYTES # BLD AUTO: 2.4 K/UL (ref 1–4.8)
LYMPHOCYTES NFR BLD: 38.2 % (ref 18–48)
MCH RBC QN AUTO: 28.2 PG (ref 27–31)
MCHC RBC AUTO-ENTMCNC: 32.9 G/DL (ref 32–36)
MCV RBC AUTO: 86 FL (ref 82–98)
MONOCYTES # BLD AUTO: 0.7 K/UL (ref 0.3–1)
MONOCYTES NFR BLD: 10.5 % (ref 4–15)
NEUTROPHILS # BLD AUTO: 2.6 K/UL (ref 1.8–7.7)
NEUTROPHILS NFR BLD: 41.2 % (ref 38–73)
NONHDLC SERPL-MCNC: 97 MG/DL
NRBC BLD-RTO: 0 /100 WBC
PLATELET # BLD AUTO: 247 K/UL (ref 150–450)
PMV BLD AUTO: 12.1 FL (ref 9.2–12.9)
POTASSIUM SERPL-SCNC: 4.5 MMOL/L (ref 3.5–5.1)
PROT SERPL-MCNC: 7.8 G/DL (ref 6–8.4)
RBC # BLD AUTO: 5.74 M/UL (ref 4.6–6.2)
SODIUM SERPL-SCNC: 135 MMOL/L (ref 136–145)
TRIGL SERPL-MCNC: 56 MG/DL (ref 30–150)
WBC # BLD AUTO: 6.38 K/UL (ref 3.9–12.7)

## 2024-04-08 PROCEDURE — 80053 COMPREHEN METABOLIC PANEL: CPT | Performed by: INTERNAL MEDICINE

## 2024-04-08 PROCEDURE — 85025 COMPLETE CBC W/AUTO DIFF WBC: CPT | Performed by: INTERNAL MEDICINE

## 2024-04-08 PROCEDURE — 36415 COLL VENOUS BLD VENIPUNCTURE: CPT | Mod: PO | Performed by: INTERNAL MEDICINE

## 2024-04-08 PROCEDURE — 80061 LIPID PANEL: CPT | Performed by: INTERNAL MEDICINE

## 2024-04-09 ENCOUNTER — TELEPHONE (OUTPATIENT)
Dept: CARDIOLOGY | Facility: CLINIC | Age: 89
End: 2024-04-09
Payer: MEDICARE

## 2024-04-09 NOTE — TELEPHONE ENCOUNTER
----- Message from Bernardo Tobin MD sent at 4/9/2024 11:41 AM CDT -----  Regarding: Labs  Please let Mr. Braswell know that his labs (his potassium and kidney function especially) are just fine and no adjustments are needed.    Bernardo Shaffer    ----- Message -----  From: David, [facility] Interface  Sent: 4/8/2024   1:39 PM CDT  To: Bernardo Tobin MD

## 2024-05-03 ENCOUNTER — OFFICE VISIT (OUTPATIENT)
Dept: CARDIOLOGY | Facility: CLINIC | Age: 89
End: 2024-05-03
Payer: MEDICARE

## 2024-05-03 VITALS
HEIGHT: 70 IN | WEIGHT: 168.19 LBS | DIASTOLIC BLOOD PRESSURE: 50 MMHG | HEART RATE: 77 BPM | SYSTOLIC BLOOD PRESSURE: 118 MMHG | OXYGEN SATURATION: 98 % | BODY MASS INDEX: 24.08 KG/M2

## 2024-05-03 DIAGNOSIS — I10 ESSENTIAL HYPERTENSION: ICD-10-CM

## 2024-05-03 DIAGNOSIS — I25.810 CORONARY ARTERY DISEASE INVOLVING CORONARY BYPASS GRAFT OF NATIVE HEART WITHOUT ANGINA PECTORIS: Primary | ICD-10-CM

## 2024-05-03 DIAGNOSIS — Z95.2 S/P AORTIC VALVE REPLACEMENT: ICD-10-CM

## 2024-05-03 DIAGNOSIS — E78.2 MIXED HYPERLIPIDEMIA: ICD-10-CM

## 2024-05-03 DIAGNOSIS — R29.818 SUSPECTED SLEEP APNEA: ICD-10-CM

## 2024-05-03 DIAGNOSIS — I50.20 HEART FAILURE WITH REDUCED EJECTION FRACTION: ICD-10-CM

## 2024-05-03 DIAGNOSIS — T82.897D AORTIC PROSTHETIC VALVE REGURGITATION, SUBSEQUENT ENCOUNTER: ICD-10-CM

## 2024-05-03 PROCEDURE — 99999 PR PBB SHADOW E&M-EST. PATIENT-LVL V: CPT | Mod: PBBFAC,,, | Performed by: INTERNAL MEDICINE

## 2024-05-03 PROCEDURE — 99214 OFFICE O/P EST MOD 30 MIN: CPT | Mod: S$GLB,,, | Performed by: INTERNAL MEDICINE

## 2024-05-03 PROCEDURE — 3288F FALL RISK ASSESSMENT DOCD: CPT | Mod: CPTII,S$GLB,, | Performed by: INTERNAL MEDICINE

## 2024-05-03 PROCEDURE — 1126F AMNT PAIN NOTED NONE PRSNT: CPT | Mod: CPTII,S$GLB,, | Performed by: INTERNAL MEDICINE

## 2024-05-03 PROCEDURE — 1101F PT FALLS ASSESS-DOCD LE1/YR: CPT | Mod: CPTII,S$GLB,, | Performed by: INTERNAL MEDICINE

## 2024-05-03 PROCEDURE — 1159F MED LIST DOCD IN RCRD: CPT | Mod: CPTII,S$GLB,, | Performed by: INTERNAL MEDICINE

## 2024-05-03 RX ORDER — METOPROLOL SUCCINATE 25 MG/1
25 TABLET, EXTENDED RELEASE ORAL DAILY
Qty: 90 TABLET | Refills: 3 | Status: SHIPPED | OUTPATIENT
Start: 2024-05-03 | End: 2025-05-03

## 2024-05-03 RX ORDER — DAPAGLIFLOZIN 10 MG/1
10 TABLET, FILM COATED ORAL DAILY
Qty: 90 TABLET | Refills: 3 | Status: SHIPPED | OUTPATIENT
Start: 2024-05-03 | End: 2024-05-16 | Stop reason: ALTCHOICE

## 2024-05-03 NOTE — PROGRESS NOTES
Subjective:   Patient ID:  Chang Morillo is a 89 y.o. male who presents for follow up of No chief complaint on file.      HPI: Back for four week f/u after having been seen urgently at the start of April with acute volume overload.  By the time I saw him, he had already quickly diuresed 5-7 lbs.  He was 170 lb in clinic that day.    Today he feels well and denies chest discomfort, LAUREN, palpitations, PND/orthopnea, lightheadedness and syncope.    He rarely has to take a second furosemide, but since stopping eating [employer] fried chicken, he hasn't.          Patient Active Problem List   Diagnosis    History of benign prostatic hypertrophy    Male erectile disorder    Hematuria    Valvular heart disease    Coronary artery disease involving coronary bypass graft    Decreased hearing    S/P aortic valve replacement (bioprosthetic)    Aortic prosthetic valve regurgitation    Unspecified symptoms and signs involving cognitive functions and awareness    Essential hypertension    MCI (mild cognitive impairment)    Fatigue    Hypertensive urgency    Mixed hyperlipidemia    Nuclear sclerotic cataract of left eye    Heart failure with reduced ejection fraction    Acute combined systolic and diastolic heart failure       Current Outpatient Medications   Medication Sig Dispense Refill    amLODIPine (NORVASC) 10 MG tablet Take 1 tablet (10 mg total) by mouth once daily. 90 tablet 3    amoxicillin (AMOXIL) 500 MG capsule       ascorbic acid, vitamin C, (VITAMIN C) 1000 MG tablet Take 1,000 mg by mouth 2 (two) times a day.      aspirin 81 mg Cap Take 81 mg/kg/day by mouth.      atorvastatin (LIPITOR) 80 MG tablet Take 1 tablet (80 mg total) by mouth once daily. 90 tablet 3    co-enzyme Q-10 30 mg capsule Take 3 capsules (90 mg total) by mouth 2 (two) times daily.      finasteride (PROSCAR) 5 mg tablet       furosemide (LASIX) 40 MG tablet Take 1 tablet (40 mg total) by mouth 2 (two) times daily. 60 tablet 5    ID NOW  COVID-19 TEST KIT Kit TEST AS DIRECTED TODAY      ketoconazole (NIZORAL) 2 % shampoo Wash scalp with medicated shampoo at least 2x/week. Let sit on scalp at least 5 minutes prior to rinsing 240 mL 5    Lactobacillus acidophilus (PROBIOTIC) 10 billion cell Cap Take 100 tablets by mouth 2 (two) times a day.      loratadine (CLARITIN) 10 mg tablet Take 10 mg by mouth once daily.      losartan (COZAAR) 50 MG tablet Take 50 mg by mouth once daily.      omeprazole (PRILOSEC OTC) 20 MG tablet Take 20 mg by mouth once daily.      omeprazole (PRILOSEC) 40 MG capsule Take 40 mg by mouth.      prednisolon/gatiflox/bromfenac (PREDNISOL ACE-GATIFLOX-BROMFEN) 1-0.5-0.075 % DrpS Apply 1 drop to eye 3 (three) times daily. in operative eye for 1 month after surgery 5 mL 3    turmeric 400 mg Cap Take by mouth daily 2 hours after breakfast.       No current facility-administered medications for this visit.       ROS  The review of systems is negative except as above.     Objective:   Physical Exam  Vitals reviewed.   Constitutional:       Appearance: He is well-developed.   HENT:      Head: Normocephalic and atraumatic.   Eyes:      General: No scleral icterus.     Conjunctiva/sclera: Conjunctivae normal.   Neck:      Vascular: No JVD.   Cardiovascular:      Rate and Rhythm: Normal rate and regular rhythm.      Pulses: Intact distal pulses.      Heart sounds: Murmur (only a brief soft systolic murmur at the base) heard.      No friction rub. No gallop.   Pulmonary:      Effort: Pulmonary effort is normal.      Breath sounds: Normal breath sounds. No wheezing or rales.   Abdominal:      General: Bowel sounds are normal. There is no distension.      Palpations: Abdomen is soft.      Tenderness: There is no abdominal tenderness.   Musculoskeletal:         General: Swellin-2+ pretibial edema bilaterally. Normal range of motion.      Cervical back: Normal range of motion and neck supple.   Skin:     General: Skin is warm and dry.       "Findings: No erythema or rash.   Neurological:      Mental Status: He is alert and oriented to person, place, and time.   Psychiatric:         Behavior: Behavior normal.         Thought Content: Thought content normal.         Judgment: Judgment normal.         Lab Results   Component Value Date    WBC 6.38 04/08/2024    HGB 16.2 04/08/2024    HCT 49.2 04/08/2024    MCV 86 04/08/2024     04/08/2024         Chemistry        Component Value Date/Time     (L) 04/08/2024 0900    K 4.5 04/08/2024 0900    CL 96 04/08/2024 0900    CO2 29 04/08/2024 0900    BUN 20 04/08/2024 0900    CREATININE 1.1 04/08/2024 0900    GLU 92 04/08/2024 0900        Component Value Date/Time    CALCIUM 10.4 04/08/2024 0900    ALKPHOS 124 04/08/2024 0900    AST 27 04/08/2024 0900    ALT 24 04/08/2024 0900    BILITOT 1.1 (H) 04/08/2024 0900    ESTGFRAFRICA >60.0 06/01/2021 1237    EGFRNONAA >60.0 06/01/2021 1237            Lab Results   Component Value Date    CHOL 157 04/08/2024    CHOL 223 (H) 06/15/2016     Lab Results   Component Value Date    HDL 60 04/08/2024    HDL 61 06/15/2016     Lab Results   Component Value Date    LDLCALC 85.8 04/08/2024    LDLCALC 134.8 06/15/2016     Lab Results   Component Value Date    TRIG 56 04/08/2024    TRIG 136 06/15/2016     Lab Results   Component Value Date    CHOLHDL 38.2 04/08/2024    CHOLHDL 27.4 06/15/2016       Lab Results   Component Value Date    TSH 1.405 10/22/2018       No results found for: "HGBA1C"    Assessment:     1. 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 Aortic prosthetic valve regurgitation, subsequent encounter    3. Essential hypertension    4. Mixed hyperlipidemia    5. S/P aortic valve replacement (bioprosthetic)        Plan:     Continue current medicines.    Diet/exercise goals reinforced.    F/U 3 months            "

## 2024-05-16 ENCOUNTER — TELEPHONE (OUTPATIENT)
Dept: CARDIOLOGY | Facility: CLINIC | Age: 89
End: 2024-05-16
Payer: MEDICARE

## 2024-05-16 NOTE — TELEPHONE ENCOUNTER
Spoke w. wife. She wanted to change pt's prescription for Jardiance to 90 days and I told her that it had already been changed today.  Her main reason to call was that they received metoprolol from the pharmacy. She stated that pt had been taken off metoprolol in the past due to borderline BP. I told her that it had been ordered by dr Tobin on 5/3. They should make sure to check BP and call us if issue w. readings. She verbalized understanding.

## 2024-08-05 ENCOUNTER — PATIENT MESSAGE (OUTPATIENT)
Dept: CARDIOLOGY | Facility: CLINIC | Age: 89
End: 2024-08-05
Payer: MEDICARE

## 2024-08-14 NOTE — TELEPHONE ENCOUNTER
Please advise what schedule I should place the patient on.  I offered a fellow on your clinic consult day 9/10 but she declined.

## 2024-08-20 ENCOUNTER — OFFICE VISIT (OUTPATIENT)
Dept: CARDIOLOGY | Facility: CLINIC | Age: 89
End: 2024-08-20
Payer: MEDICARE

## 2024-08-20 VITALS
HEART RATE: 67 BPM | WEIGHT: 161.81 LBS | BODY MASS INDEX: 23.17 KG/M2 | DIASTOLIC BLOOD PRESSURE: 78 MMHG | OXYGEN SATURATION: 96 % | SYSTOLIC BLOOD PRESSURE: 120 MMHG | HEIGHT: 70 IN

## 2024-08-20 DIAGNOSIS — Z95.2 S/P AORTIC VALVE REPLACEMENT: ICD-10-CM

## 2024-08-20 DIAGNOSIS — T82.897D AORTIC PROSTHETIC VALVE REGURGITATION, SUBSEQUENT ENCOUNTER: ICD-10-CM

## 2024-08-20 DIAGNOSIS — E78.2 MIXED HYPERLIPIDEMIA: ICD-10-CM

## 2024-08-20 DIAGNOSIS — I38 VALVULAR HEART DISEASE: ICD-10-CM

## 2024-08-20 DIAGNOSIS — I10 ESSENTIAL HYPERTENSION: ICD-10-CM

## 2024-08-20 DIAGNOSIS — I25.810 CORONARY ARTERY DISEASE INVOLVING CORONARY BYPASS GRAFT OF NATIVE HEART WITHOUT ANGINA PECTORIS: Primary | ICD-10-CM

## 2024-08-20 PROCEDURE — 1126F AMNT PAIN NOTED NONE PRSNT: CPT | Mod: CPTII,S$GLB,, | Performed by: INTERNAL MEDICINE

## 2024-08-20 PROCEDURE — 1101F PT FALLS ASSESS-DOCD LE1/YR: CPT | Mod: CPTII,S$GLB,, | Performed by: INTERNAL MEDICINE

## 2024-08-20 PROCEDURE — 3288F FALL RISK ASSESSMENT DOCD: CPT | Mod: CPTII,S$GLB,, | Performed by: INTERNAL MEDICINE

## 2024-08-20 PROCEDURE — 99999 PR PBB SHADOW E&M-EST. PATIENT-LVL V: CPT | Mod: PBBFAC,,, | Performed by: INTERNAL MEDICINE

## 2024-08-20 PROCEDURE — 1159F MED LIST DOCD IN RCRD: CPT | Mod: CPTII,S$GLB,, | Performed by: INTERNAL MEDICINE

## 2024-08-20 PROCEDURE — 99214 OFFICE O/P EST MOD 30 MIN: CPT | Mod: S$GLB,,, | Performed by: INTERNAL MEDICINE

## 2024-08-20 PROCEDURE — 1160F RVW MEDS BY RX/DR IN RCRD: CPT | Mod: CPTII,S$GLB,, | Performed by: INTERNAL MEDICINE

## 2024-08-20 NOTE — PROGRESS NOTES
Subjective:   Patient ID:  Chang Morillo is a 89 y.o. male who presents for follow up of Coronary Artery Disease      HPI: 3.5 month f/u as a matter of routine after having some difficulties with volume overload in the spring of the year.  No change in weight or symptoms.    He denies chest discomfort, LAUREN, palpitations, PND/orthopnea, lightheadedness and syncope.        5/3/2024 HPI: Back for four week f/u after having been seen urgently at the start of April with acute volume overload.  By the time I saw him, he had already quickly diuresed 5-7 lbs.  He was 170 lb in clinic that day.     Today he feels well and denies chest discomfort, LAUREN, palpitations, PND/orthopnea, lightheadedness and syncope.     He rarely has to take a second furosemide, but since stopping eating [employer] fried chicken, he hasn't.      4/5/2024 HPI: Here urgently after noticing more dyspnea with weight gain and leg swelling and wheezing for the last month.  Yesterday, I got him an echo, noted a plethoric IVC, and prescribed lasix 40 bid.  He had been naive to that medicine.     Since, he's lost 5-7 lbs with copious urine output.           June 2021 HPI: 28 month f/u of CAD s/p CABG, bioprosthetic AVR, and central prosthetic regurgitation.  He is doing well with no new symptoms or cardiovascular complaints and no change in exercise capacity.  He denies chest discomfort, LAUREN, palpitations, PND/orthopnea, lightheadedness and syncope.     His wife joins him again today and notes that she used to be a  in [place].  She's not sad that she's now mostly retired, though she does some consulting work.     BPs at home have been variable 120s-130s systolic occasionally, but often 140-160.  He was recently started on amlodipine when he really didn't want to double his HCTZ after a slightly decreased Na+ concentration was noted on an outside BMP.  These results are not available.       Feb 2019 HPI: Routine 6 month f/u of CABG,  AVR, and central leak.  He is doing well with no new symptoms or cardiovascular complaints and no change in exercise capacity.  He denies chest discomfort, LAUREN, palpitations, PND/orthopnea, lightheadedness and syncope.     He has occasional calf tightness - at least in the past - but it hasn't been happening as much regularly.  His wife, a retired family medicine doctor, is worried that he's dialing back his activity.       July 2018 HPI: Very pleasant man previously seen by Dr. Lewis with Dr. Jolene Lantigua in 2016 who presents with episodes of what he describes as dizziness (not room-spinning but with a feeling of unsteadiness and lightheadness).  He has had two      He had a CABG and AVR (bioprosthesis) in 2007 and on his 2016 echo there was evidence of mild to moderate AI.  It was not stated whether the insufficiency was central or paravalvular       2018 Echo:  CONCLUSIONS     1 - Low normal left ventricular systolic function (EF 50-55%).     2 - No wall motion abnormalities.     3 - Biatrial enlargement.     4 - Indeterminate LV diastolic function.     5 - Right ventricular enlargement with low normal to mildly depressed systolic function.     6 - The estimated PA systolic pressure is 25 mmHg.     7 - Aortic valve prosthesis, LIZTETE = 1.07 cm2, AVAi = 0.54 cm2/m2, peak velocity = 3.18 m/s, mean gradient = 24 mmHg.     8 - Mild to moderate valvular aortic regurgitation.     9 - Trivial mitral regurgitation.     10 - Trivial tricuspid regurgitation.     11 - Consider degenerating aortic bioiprosthesis.  Clinical correlation.     Patient Active Problem List   Diagnosis    History of benign prostatic hypertrophy    Male erectile disorder    Hematuria    Valvular heart disease    Coronary artery disease involving coronary bypass graft    Decreased hearing    S/P aortic valve replacement (bioprosthetic)    Aortic prosthetic valve regurgitation    Unspecified symptoms and signs involving cognitive functions and awareness     Essential hypertension    MCI (mild cognitive impairment)    Fatigue    Hypertensive urgency    Mixed hyperlipidemia    Nuclear sclerotic cataract of left eye    Heart failure with reduced ejection fraction    Acute combined systolic and diastolic heart failure       Current Outpatient Medications   Medication Sig    amLODIPine (NORVASC) 10 MG tablet Take 1 tablet (10 mg total) by mouth once daily.    amoxicillin (AMOXIL) 500 MG capsule     ascorbic acid, vitamin C, (VITAMIN C) 1000 MG tablet Take 1,000 mg by mouth 2 (two) times a day.    aspirin 81 mg Cap Take 81 mg/kg/day by mouth.    co-enzyme Q-10 30 mg capsule Take 3 capsules (90 mg total) by mouth 2 (two) times daily.    empagliflozin (JARDIANCE) 10 mg tablet Take 1 tablet (10 mg total) by mouth once daily.    finasteride (PROSCAR) 5 mg tablet     furosemide (LASIX) 40 MG tablet Take 1 tablet (40 mg total) by mouth 2 (two) times daily.    ID NOW COVID-19 TEST KIT Kit     ketoconazole (NIZORAL) 2 % shampoo Wash scalp with medicated shampoo at least 2x/week. Let sit on scalp at least 5 minutes prior to rinsing    Lactobacillus acidophilus (PROBIOTIC) 10 billion cell Cap Take 100 tablets by mouth 2 (two) times a day.    loratadine (CLARITIN) 10 mg tablet Take 10 mg by mouth once daily.    losartan (COZAAR) 50 MG tablet Take 50 mg by mouth once daily.    metoprolol succinate (TOPROL-XL) 25 MG 24 hr tablet Take 1 tablet (25 mg total) by mouth once daily.    omeprazole (PRILOSEC OTC) 20 MG tablet Take 20 mg by mouth once daily.    turmeric 400 mg Cap Take by mouth daily 2 hours after breakfast.    atorvastatin (LIPITOR) 80 MG tablet Take 1 tablet (80 mg total) by mouth once daily.    omeprazole (PRILOSEC) 40 MG capsule Take 40 mg by mouth. (Patient not taking: Reported on 8/20/2024)    prednisolon/gatiflox/bromfenac (PREDNISOL ACE-GATIFLOX-BROMFEN) 1-0.5-0.075 % DrpS Apply 1 drop to eye 3 (three) times daily. in operative eye for 1 month after surgery     No  current facility-administered medications for this visit.       ROS  The review of systems is negative except as above.     Objective:   Physical Exam  Vitals reviewed.   Constitutional:       Appearance: He is well-developed.   HENT:      Head: Normocephalic and atraumatic.   Eyes:      General: No scleral icterus.     Conjunctiva/sclera: Conjunctivae normal.   Neck:      Vascular: No JVD.   Cardiovascular:      Rate and Rhythm: Normal rate and regular rhythm.      Pulses: Intact distal pulses.      Heart sounds: Murmur (only a brief soft systolic murmur at the base) heard.      No friction rub. No gallop.   Pulmonary:      Effort: Pulmonary effort is normal.      Breath sounds: Normal breath sounds. No wheezing or rales.   Abdominal:      General: Bowel sounds are normal. There is no distension.      Palpations: Abdomen is soft.      Tenderness: There is no abdominal tenderness.   Musculoskeletal:         General: Swellin-2+ pretibial edema bilaterally. Normal range of motion.      Cervical back: Normal range of motion and neck supple.   Skin:     General: Skin is warm and dry.      Findings: No erythema or rash.   Neurological:      Mental Status: He is alert and oriented to person, place, and time.   Psychiatric:         Behavior: Behavior normal.         Thought Content: Thought content normal.         Judgment: Judgment normal.     Lab Results   Component Value Date    WBC 6.38 2024    HGB 16.2 2024    HCT 49.2 2024    MCV 86 2024     2024         Chemistry        Component Value Date/Time     (L) 2024 0900    K 4.5 2024 0900    CL 96 2024 0900    CO2 29 2024 0900    BUN 20 2024 0900    CREATININE 1.1 2024 0900    GLU 92 2024 0900        Component Value Date/Time    CALCIUM 10.4 2024 0900    ALKPHOS 124 2024 0900    AST 27 2024 0900    ALT 24 2024 0900    BILITOT 1.1 (H) 2024 0900     "ESTGFRAFRICA >60.0 06/01/2021 1237    EGFRNONAA >60.0 06/01/2021 1237            Lab Results   Component Value Date    CHOL 157 04/08/2024    CHOL 223 (H) 06/15/2016     Lab Results   Component Value Date    HDL 60 04/08/2024    HDL 61 06/15/2016     Lab Results   Component Value Date    LDLCALC 85.8 04/08/2024    LDLCALC 134.8 06/15/2016     Lab Results   Component Value Date    TRIG 56 04/08/2024    TRIG 136 06/15/2016     Lab Results   Component Value Date    CHOLHDL 38.2 04/08/2024    CHOLHDL 27.4 06/15/2016       Lab Results   Component Value Date    TSH 1.405 10/22/2018       No results found for: "HGBA1C"    Assessment:     1. 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 Valvular heart disease    3. Aortic prosthetic valve regurgitation, subsequent encounter    4. Essential hypertension    5. Mixed hyperlipidemia    6. S/P aortic valve replacement (bioprosthetic)        Plan:     Continue current medicines.    Diet/exercise goals reinforced.    F/U 6 months            "

## 2024-08-26 NOTE — H&P (VIEW-ONLY)
- Encouraged healthy lifestyle, including adequate exercise and high fiber, low fat and low carb diet.    HPI     Post-op Evaluation            Comments: Chang Braswell is a 88 y/o male           Comments    Pt here for 1 week Post Op   Pt state no complaints at this time   Denies F/F              Last edited by Rubia Alicia on 3/14/2023 10:10 AM.            Assessment /Plan     For exam results, see Encounter Report.    Nuclear sclerosis, bilateral    Post-operative state      Slit lamp exam:  L/L: nl  K: clear, wound sealed  AC: trace cell  Iris/Lens: IOL centered and stable    POW1 s/p phaco: Surgery healing well with no signs of infection or abnormal inflammation.    Patient wishes to proceed with surgery in the second eye. Risks, benefits, alternatives reviewed. IOL selection reviewed.       Left eye  IOL: CNA0T0 25.0 (intermediate target)

## 2025-02-18 DIAGNOSIS — I50.9 CONGESTIVE HEART FAILURE, UNSPECIFIED HF CHRONICITY, UNSPECIFIED HEART FAILURE TYPE: Primary | ICD-10-CM

## 2025-03-27 ENCOUNTER — TELEPHONE (OUTPATIENT)
Dept: CARDIOLOGY | Facility: CLINIC | Age: 89
End: 2025-03-27
Payer: MEDICARE

## 2025-03-27 DIAGNOSIS — I25.700 CORONARY ARTERY DISEASE INVOLVING CORONARY BYPASS GRAFT OF NATIVE HEART WITH UNSTABLE ANGINA PECTORIS: Primary | ICD-10-CM

## 2025-03-27 DIAGNOSIS — I50.20 HEART FAILURE WITH REDUCED EJECTION FRACTION: ICD-10-CM

## 2025-03-27 DIAGNOSIS — I50.41 ACUTE COMBINED SYSTOLIC AND DIASTOLIC HEART FAILURE: ICD-10-CM

## 2025-03-27 DIAGNOSIS — T82.897A AORTIC PROSTHETIC VALVE REGURGITATION, INITIAL ENCOUNTER: ICD-10-CM

## 2025-03-27 DIAGNOSIS — Z95.2 S/P AORTIC VALVE REPLACEMENT: ICD-10-CM

## 2025-03-27 NOTE — TELEPHONE ENCOUNTER
Received call for an appt with Echo for Dr. Tobin.  Pt to be scheduled for an echo and an appt at 1 pm on Dr. Tobin's echo day

## 2025-04-04 ENCOUNTER — HOSPITAL ENCOUNTER (OUTPATIENT)
Dept: CARDIOLOGY | Facility: HOSPITAL | Age: 89
Discharge: HOME | End: 2025-04-04
Attending: INTERNAL MEDICINE
Payer: MEDICARE

## 2025-04-04 VITALS
HEART RATE: 74 BPM | DIASTOLIC BLOOD PRESSURE: 78 MMHG | WEIGHT: 161.81 LBS | BODY MASS INDEX: 23.17 KG/M2 | HEIGHT: 70 IN | SYSTOLIC BLOOD PRESSURE: 120 MMHG

## 2025-04-04 DIAGNOSIS — Z95.2 S/P AORTIC VALVE REPLACEMENT: ICD-10-CM

## 2025-04-04 DIAGNOSIS — I50.41 ACUTE COMBINED SYSTOLIC AND DIASTOLIC HEART FAILURE: ICD-10-CM

## 2025-04-04 DIAGNOSIS — I50.20 HEART FAILURE WITH REDUCED EJECTION FRACTION: ICD-10-CM

## 2025-04-04 LAB
ASCENDING AORTA: 3.31 CM
AV AREA BY CONTINUOUS VTI: 2 CM2
AV INDEX (PROSTH): 0.32
AV LVOT MEAN GRADIENT: 1 MMHG
AV LVOT PEAK GRADIENT: 2 MMHG
AV MEAN GRADIENT: 17 MMHG
AV PEAK GRADIENT: 29 MMHG
AV REGURGITATION PRESSURE HALF TIME: 404 MS
AV VALVE AREA BY VELOCITY RATIO: 2 CM²
AV VALVE AREA: 2.1 CM2
AV VELOCITY RATIO: 0.3
BSA FOR ECHO PROCEDURE: 1.9 M2
CV ECHO LV RWT: 0.3 CM
DOP CALC AO PEAK VEL: 2.7 M/S
DOP CALC AO VTI: 51.7 CM
DOP CALC LVOT AREA: 6.6 CM2
DOP CALC LVOT DIAMETER: 2.9 CM
DOP CALC LVOT PEAK VEL: 0.8 M/S
DOP CALC LVOT STROKE VOLUME: 108.3 CM3
DOP CALCLVOT PEAK VEL VTI: 16.4 CM
ECHO EF ESTIMATED: 42 %
ECHO LV POSTERIOR WALL: 0.9 CM (ref 0.6–1.1)
FRACTIONAL SHORTENING: 21.7 % (ref 28–44)
INTERVENTRICULAR SEPTUM: 0.8 CM (ref 0.6–1.1)
LA MAJOR: 6.3 CM
LA MINOR: 6.2 CM
LA WIDTH: 5.3 CM
LEFT ATRIUM SIZE: 5 CM
LEFT ATRIUM VOLUME INDEX MOD: 85 ML/M2
LEFT ATRIUM VOLUME INDEX: 74 ML/M2
LEFT ATRIUM VOLUME MOD: 163 ML
LEFT ATRIUM VOLUME: 141 CM3
LEFT INTERNAL DIMENSION IN SYSTOLE: 4.7 CM (ref 2.1–4)
LEFT VENTRICLE DIASTOLIC VOLUME INDEX: 94.24 ML/M2
LEFT VENTRICLE DIASTOLIC VOLUME: 180 ML
LEFT VENTRICLE MASS INDEX: 105.1 G/M2
LEFT VENTRICLE SYSTOLIC VOLUME INDEX: 54.5 ML/M2
LEFT VENTRICLE SYSTOLIC VOLUME: 104 ML
LEFT VENTRICULAR INTERNAL DIMENSION IN DIASTOLE: 6 CM (ref 3.5–6)
LEFT VENTRICULAR MASS: 200.7 G
LV LATERAL E/E' RATIO: 10.2 M/S
MV PEAK E VEL: 1.12 M/S
OHS CV RV/LV RATIO: 1.08 CM
OHS LV EJECTION FRACTION SIMPSONS BIPLANE MOD: 30 %
PISA TR MAX VEL: 3.6 M/S
PULM VEIN S/D RATIO: 0.26
PV PEAK D VEL: 0.74 M/S
PV PEAK S VEL: 0.19 M/S
RA MAJOR: 6.96 CM
RA PRESSURE ESTIMATED: 15 MMHG
RA WIDTH: 5.43 CM
RIGHT VENTRICLE DIASTOLIC BASEL DIMENSION: 6.5 CM
RV TB RVSP: 19 MMHG
RV TISSUE DOPPLER FREE WALL SYSTOLIC VELOCITY 1 (APICAL 4 CHAMBER VIEW): 9.09 CM/S
SINUS: 4.01 CM
STJ: 2.81 CM
TDI LATERAL: 0.11 M/S
TRICUSPID ANNULAR PLANE SYSTOLIC EXCURSION: 1.66 CM
TV PEAK GRADIENT: 52 MMHG
TV REST PULMONARY ARTERY PRESSURE: 67 MMHG
Z-SCORE OF LEFT VENTRICULAR DIMENSION IN END DIASTOLE: 1.1
Z-SCORE OF LEFT VENTRICULAR DIMENSION IN END SYSTOLE: 2.77

## 2025-04-04 PROCEDURE — 93306 TTE W/DOPPLER COMPLETE: CPT | Mod: 26,,, | Performed by: INTERNAL MEDICINE

## 2025-04-04 PROCEDURE — 93306 TTE W/DOPPLER COMPLETE: CPT

## 2025-04-07 ENCOUNTER — PATIENT MESSAGE (OUTPATIENT)
Dept: CARDIOLOGY | Facility: CLINIC | Age: 89
End: 2025-04-07
Payer: MEDICARE

## 2025-04-08 ENCOUNTER — OFFICE VISIT (OUTPATIENT)
Dept: CARDIOLOGY | Facility: CLINIC | Age: 89
End: 2025-04-08
Payer: MEDICARE

## 2025-04-08 VITALS
DIASTOLIC BLOOD PRESSURE: 65 MMHG | HEIGHT: 70 IN | SYSTOLIC BLOOD PRESSURE: 107 MMHG | WEIGHT: 150.56 LBS | BODY MASS INDEX: 21.55 KG/M2 | OXYGEN SATURATION: 98 % | HEART RATE: 81 BPM

## 2025-04-08 DIAGNOSIS — I50.41 ACUTE COMBINED SYSTOLIC AND DIASTOLIC HEART FAILURE: Primary | ICD-10-CM

## 2025-04-08 DIAGNOSIS — I50.20 HEART FAILURE WITH REDUCED EJECTION FRACTION: ICD-10-CM

## 2025-04-08 DIAGNOSIS — I10 ESSENTIAL HYPERTENSION: ICD-10-CM

## 2025-04-08 DIAGNOSIS — I25.810 CORONARY ARTERY DISEASE INVOLVING CORONARY BYPASS GRAFT OF NATIVE HEART WITHOUT ANGINA PECTORIS: ICD-10-CM

## 2025-04-08 DIAGNOSIS — Z95.2 S/P AORTIC VALVE REPLACEMENT: ICD-10-CM

## 2025-04-08 DIAGNOSIS — E78.2 MIXED HYPERLIPIDEMIA: ICD-10-CM

## 2025-04-08 PROCEDURE — 99214 OFFICE O/P EST MOD 30 MIN: CPT | Mod: S$GLB,,, | Performed by: INTERNAL MEDICINE

## 2025-04-08 PROCEDURE — 1160F RVW MEDS BY RX/DR IN RCRD: CPT | Mod: CPTII,S$GLB,, | Performed by: INTERNAL MEDICINE

## 2025-04-08 PROCEDURE — 99999 PR PBB SHADOW E&M-EST. PATIENT-LVL V: CPT | Mod: PBBFAC,,, | Performed by: INTERNAL MEDICINE

## 2025-04-08 PROCEDURE — 1159F MED LIST DOCD IN RCRD: CPT | Mod: CPTII,S$GLB,, | Performed by: INTERNAL MEDICINE

## 2025-04-08 PROCEDURE — 1101F PT FALLS ASSESS-DOCD LE1/YR: CPT | Mod: CPTII,S$GLB,, | Performed by: INTERNAL MEDICINE

## 2025-04-08 PROCEDURE — 3288F FALL RISK ASSESSMENT DOCD: CPT | Mod: CPTII,S$GLB,, | Performed by: INTERNAL MEDICINE

## 2025-04-08 PROCEDURE — 1126F AMNT PAIN NOTED NONE PRSNT: CPT | Mod: CPTII,S$GLB,, | Performed by: INTERNAL MEDICINE

## 2025-04-08 RX ORDER — TAMSULOSIN HYDROCHLORIDE 0.4 MG/1
1 CAPSULE ORAL
COMMUNITY
Start: 2025-04-03 | End: 2025-10-04

## 2025-04-08 RX ORDER — AZELASTINE 1 MG/ML
SPRAY, METERED NASAL
COMMUNITY

## 2025-04-08 RX ORDER — NIRMATRELVIR AND RITONAVIR 300-100 MG
KIT ORAL
COMMUNITY
Start: 2024-12-16

## 2025-04-08 RX ORDER — METRONIDAZOLE 7.5 MG/G
CREAM TOPICAL
COMMUNITY

## 2025-04-08 RX ORDER — MEGESTROL ACETATE 20 MG/1
TABLET ORAL
COMMUNITY
Start: 2025-02-17 | End: 2025-05-18

## 2025-04-08 NOTE — PROGRESS NOTES
Subjective:   Patient ID:  Chang Morlilo is a 89 y.o. male who presents for follow up of Coronary artery disease involving coronary bypass graft of       HPI: Somewhat urgent follow up for increasing fatigue and after an echo yesterday showed what appeared to be elevated right and left heart pressures.  He's had fatigue but also says he doesn't really know what shortness of breath is.  He can lie flat without difficulty, has actually lost weight, and has no leg edema of significance.      Echo 4/7/2025      Left Ventricle: The left ventricle is mildly dilated. Normal wall thickness. Global hypokinesis present. Septal motion is consistent with bundle branch block. There is severely reduced systolic function with a visually estimated ejection fraction of 25 - 30%. Quantitated ejection fraction is 30%. There is diastolic dysfunction. Elevated left ventricular filling pressure.    Right Ventricle: The right ventricle has moderate enlargement. Systolic function is moderately reduced.    Left Atrium: Severely dilated    Right Atrium: Right atrium is dilated.    Aortic Valve: There is a bioprosthetic valve in the aortic position. It is reported to be a 23 mm [employer] valve. Aortic valve area by VTI is 2.1 cm2. Aortic valve peak velocity is 2.7 m/s. Mean gradient is 17 mmHg. The dimensionless index is 0.32. There is mild to moderate aortic regurgitation with a centrally directed jet.    Mitral Valve: There is moderate regurgitation.    Tricuspid Valve: There is moderate annular dilation present. There is severe regurgitation.    Pulmonary Artery: The estimated pulmonary artery systolic pressure is 67 mmHg.    IVC/SVC: Elevated venous pressure at 15 mmHg.      8/20/2024 HPI: 3.5 month f/u as a matter of routine after having some difficulties with volume overload in the spring of the year.  No change in weight or symptoms.     He denies chest discomfort, LAUREN, palpitations, PND/orthopnea, lightheadedness and syncope.            5/3/2024 HPI: Back for four week f/u after having been seen urgently at the start of April with acute volume overload.  By the time I saw him, he had already quickly diuresed 5-7 lbs.  He was 170 lb in clinic that day.     Today he feels well and denies chest discomfort, LAUREN, palpitations, PND/orthopnea, lightheadedness and syncope.     He rarely has to take a second furosemide, but since stopping eating [employer] fried chicken, he hasn't.        4/5/2024 HPI: Here urgently after noticing more dyspnea with weight gain and leg swelling and wheezing for the last month.  Yesterday, I got him an echo, noted a plethoric IVC, and prescribed lasix 40 bid.  He had been naive to that medicine.     Since, he's lost 5-7 lbs with copious urine output.             June 2021 HPI: 28 month f/u of CAD s/p CABG, bioprosthetic AVR, and central prosthetic regurgitation.  He is doing well with no new symptoms or cardiovascular complaints and no change in exercise capacity.  He denies chest discomfort, LAUREN, palpitations, PND/orthopnea, lightheadedness and syncope.     His wife joins him again today and notes that she used to be a  in [place].  She's not sad that she's now mostly retired, though she does some consulting work.     BPs at home have been variable 120s-130s systolic occasionally, but often 140-160.  He was recently started on amlodipine when he really didn't want to double his HCTZ after a slightly decreased Na+ concentration was noted on an outside BMP.  These results are not available.        Feb 2019 HPI: Routine 6 month f/u of CABG, AVR, and central leak.  He is doing well with no new symptoms or cardiovascular complaints and no change in exercise capacity.  He denies chest discomfort, LAUREN, palpitations, PND/orthopnea, lightheadedness and syncope.     He has occasional calf tightness - at least in the past - but it hasn't been happening as much regularly.  His wife, a retired family medicine  doctor, is worried that he's dialing back his activity.        2018 HPI: Very pleasant man previously seen by Dr. Lewis with Dr. Jolene Lantigua in 2016 who presents with episodes of what he describes as dizziness (not room-spinning but with a feeling of unsteadiness and lightheadness).  He has had two      He had a CABG and AVR (bioprosthesis) in  and on his 2016 echo there was evidence of mild to moderate AI.  It was not stated whether the insufficiency was central or paravalvular        2018 Echo:  CONCLUSIONS     1 - Low normal left ventricular systolic function (EF 50-55%).     2 - No wall motion abnormalities.     3 - Biatrial enlargement.     4 - Indeterminate LV diastolic function.     5 - Right ventricular enlargement with low normal to mildly depressed systolic function.     6 - The estimated PA systolic pressure is 25 mmHg.     7 - Aortic valve prosthesis, LIZETTE = 1.07 cm2, AVAi = 0.54 cm2/m2, peak velocity = 3.18 m/s, mean gradient = 24 mmHg.     8 - Mild to moderate valvular aortic regurgitation.     9 - Trivial mitral regurgitation.     10 - Trivial tricuspid regurgitation.     11 - Consider degenerating aortic bioiprosthesis.  Clinical correlation.       Problem List[1]    Current Outpatient Medications   Medication Sig    amLODIPine (NORVASC) 10 MG tablet Take 1 tablet (10 mg total) by mouth once daily.    amoxicillin (AMOXIL) 500 MG capsule     ascorbic acid, vitamin C, (VITAMIN C) 1000 MG tablet Take 1,000 mg by mouth 2 (two) times a day.    aspirin 81 mg Cap Take 81 mg/kg/day by mouth.    atorvastatin (LIPITOR) 80 MG tablet Take 1 tablet (80 mg total) by mouth once daily.    azelastine (ASTELIN) 137 mcg (0.1 %) nasal spray SMARTSI Spray(s) Both Nares Twice Daily PRN    co-enzyme Q-10 30 mg capsule Take 3 capsules (90 mg total) by mouth 2 (two) times daily.    empagliflozin (JARDIANCE) 10 mg tablet Take 1 tablet (10 mg total) by mouth once daily.    finasteride (PROSCAR) 5 mg tablet      furosemide (LASIX) 40 MG tablet Take 1 tablet (40 mg total) by mouth 2 (two) times daily.    ID NOW COVID-19 TEST KIT Kit     ketoconazole (NIZORAL) 2 % shampoo Wash scalp with medicated shampoo at least 2x/week. Let sit on scalp at least 5 minutes prior to rinsing    Lactobacillus acidophilus (PROBIOTIC) 10 billion cell Cap Take 100 tablets by mouth 2 (two) times a day.    loratadine (CLARITIN) 10 mg tablet Take 10 mg by mouth once daily.    losartan (COZAAR) 50 MG tablet Take 50 mg by mouth once daily.    megestroL (MEGACE) 20 MG Tab 1 tablet Orally Twice a day for 30 days    metoprolol succinate (TOPROL-XL) 25 MG 24 hr tablet Take 1 tablet (25 mg total) by mouth once daily.    metronidazole 0.75% (METROCREAM) 0.75 % Crea External for 30 Days    omeprazole (PRILOSEC OTC) 20 MG tablet Take 20 mg by mouth once daily.    omeprazole (PRILOSEC) 40 MG capsule Take 40 mg by mouth.    tamsulosin (FLOMAX) 0.4 mg Cap 1 capsule.    turmeric 400 mg Cap Take by mouth daily 2 hours after breakfast.    PAXLOVID 300 mg (150 mg x 2)-100 mg copackaged tablets (EUA) Take by mouth.     No current facility-administered medications for this visit.       ROS  The review of systems is negative except as above.     Objective:   Physical Exam  Vitals reviewed.   Constitutional:       Appearance: He is well-developed.   HENT:      Head: Normocephalic and atraumatic.   Eyes:      General: No scleral icterus.     Conjunctiva/sclera: Conjunctivae normal.   Neck:      Vascular: No JVD.   Cardiovascular:      Rate and Rhythm: Normal rate and regular rhythm.      Pulses: Intact distal pulses.      Heart sounds: Murmur (only a brief soft systolic murmur at the base) heard.      No friction rub. No gallop.   Pulmonary:      Effort: Pulmonary effort is normal.      Breath sounds: Normal breath sounds. No wheezing or rales.   Abdominal:      General: Bowel sounds are normal. There is no distension.      Palpations: Abdomen is soft.      Tenderness:  "There is no abdominal tenderness.   Musculoskeletal:         General: Swellin-2+ pretibial edema bilaterally. Normal range of motion.      Cervical back: Normal range of motion and neck supple.   Skin:     General: Skin is warm and dry.      Findings: No erythema or rash.   Neurological:      Mental Status: He is alert and oriented to person, place, and time.   Psychiatric:         Behavior: Behavior normal.         Thought Content: Thought content normal.         Judgment: Judgment normal.         Lab Results   Component Value Date    WBC 6.38 2024    HGB 16.2 2024    HCT 49.2 2024    MCV 86 2024     2024         Chemistry        Component Value Date/Time     (L) 2024 0900    K 4.5 2024 0900    CL 96 2024 0900    CO2 29 2024 0900    BUN 20 2024 0900    CREATININE 1.1 2024 0900    GLU 92 2024 0900        Component Value Date/Time    CALCIUM 10.4 2024 0900    ALKPHOS 124 2024 0900    AST 27 2024 0900    ALT 24 2024 0900    BILITOT 1.1 (H) 2024 0900    ESTGFRAFRICA >60.0 2021 1237    EGFRNONAA >60.0 2021 1237            Lab Results   Component Value Date    CHOL 157 2024    CHOL 223 (H) 06/15/2016     Lab Results   Component Value Date    HDL 60 2024    HDL 61 06/15/2016     Lab Results   Component Value Date    LDLCALC 85.8 2024    LDLCALC 134.8 06/15/2016     Lab Results   Component Value Date    TRIG 56 2024    TRIG 136 06/15/2016     Lab Results   Component Value Date    CHOLHDL 38.2 2024    CHOLHDL 27.4 06/15/2016       Lab Results   Component Value Date    TSH 1.405 10/22/2018       No results found for: "HGBA1C"    Assessment:     1. Acute combined systolic and diastolic heart failure    2. Heart failure with reduced ejection fraction    3. 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 Essential " hypertension    5. Mixed hyperlipidemia    6. S/P aortic valve replacement (bioprosthetic)        Plan:     Despite his echo, I can find no physical exam findings of volume overload.  Further, he does not have orthopnea and there has been weight loss, not gain.  I'm recommending he go back to his standard dose of furosemide.    Ideally, he would be on Entresto, Jardiance (which his wife has held to see if it helps him gain some weight), and spironolactone.  But after some discussion, we've agreed to cut off amlodipine - which shouldn't be used before the other GDMT meds are exhausted - and then try to uptitrate losartan (or change to Entresto).  HR has been < 60 at times at home, so there's little room for uptitration of the BB.    Diet/exercise goals reinforced.    F/U 3 months with me on an echo day                  [1]   Patient Active Problem List  Diagnosis    History of benign prostatic hypertrophy    Male erectile disorder    Hematuria    Valvular heart disease    Coronary artery disease involving coronary bypass graft    Decreased hearing    S/P aortic valve replacement (bioprosthetic)    Aortic prosthetic valve regurgitation    Unspecified symptoms and signs involving cognitive functions and awareness    Essential hypertension    MCI (mild cognitive impairment)    Fatigue    Hypertensive urgency    Mixed hyperlipidemia    Nuclear sclerotic cataract of left eye    Heart failure with reduced ejection fraction    Acute combined systolic and diastolic heart failure

## 2025-04-29 ENCOUNTER — TELEPHONE (OUTPATIENT)
Dept: CARDIOLOGY | Facility: CLINIC | Age: 89
End: 2025-04-29
Payer: MEDICARE

## 2025-04-29 NOTE — TELEPHONE ENCOUNTER
----- Message from DA Fairchild sent at 4/29/2025 10:25 AM CDT -----  Regarding: clearance for EGD/ colon  Received request for clearance for EGD/Colon at the [facility] on 4/30 for pt . Pt takes ASA 81 and was last seen in clinic by dr Tobin on 4/8/25.Please advise if pt can be cleared by chart, I have the form to fill/sign.

## 2025-04-29 NOTE — TELEPHONE ENCOUNTER
Filled/ Signed for from [facility] faxed to [phone]. Wife was updated and verbalized understanding. She clarified that his last name is French and I talked to her about trying to get it changed in our record. She verbalized understanding.

## 2025-05-06 ENCOUNTER — LAB VISIT (OUTPATIENT)
Dept: LAB | Facility: OTHER | Age: 89
End: 2025-05-06
Attending: INTERNAL MEDICINE
Payer: MEDICARE

## 2025-05-06 DIAGNOSIS — C15.9 MALIGNANT NEOPLASM OF ESOPHAGUS, UNSPECIFIED LOCATION: Primary | ICD-10-CM

## 2025-05-06 LAB
ABSOLUTE EOSINOPHIL (OHS): 0.41 K/UL
ABSOLUTE MONOCYTE (OHS): 0.51 K/UL (ref 0.3–1)
ABSOLUTE NEUTROPHIL COUNT (OHS): 3.25 K/UL (ref 1.8–7.7)
ALBUMIN SERPL BCP-MCNC: 3.5 G/DL (ref 3.5–5.2)
ALP SERPL-CCNC: 181 UNIT/L (ref 40–150)
ALT SERPL W/O P-5'-P-CCNC: 29 UNIT/L (ref 10–44)
ANION GAP (OHS): 11 MMOL/L (ref 8–16)
AST SERPL-CCNC: 26 UNIT/L (ref 11–45)
BASOPHILS # BLD AUTO: 0.08 K/UL
BASOPHILS NFR BLD AUTO: 1.4 %
BILIRUB SERPL-MCNC: 0.8 MG/DL (ref 0.1–1)
BUN SERPL-MCNC: 45 MG/DL (ref 8–23)
CALCIUM SERPL-MCNC: 9 MG/DL (ref 8.7–10.5)
CHLORIDE SERPL-SCNC: 104 MMOL/L (ref 95–110)
CO2 SERPL-SCNC: 22 MMOL/L (ref 23–29)
CREAT SERPL-MCNC: 1.4 MG/DL (ref 0.5–1.4)
ERYTHROCYTE [DISTWIDTH] IN BLOOD BY AUTOMATED COUNT: 15.8 % (ref 11.5–14.5)
GFR SERPLBLD CREATININE-BSD FMLA CKD-EPI: 48 ML/MIN/1.73/M2
GLUCOSE SERPL-MCNC: 90 MG/DL (ref 70–110)
HCT VFR BLD AUTO: 41.8 % (ref 40–54)
HGB BLD-MCNC: 13.8 GM/DL (ref 14–18)
IMM GRANULOCYTES # BLD AUTO: 0.01 K/UL (ref 0–0.04)
IMM GRANULOCYTES NFR BLD AUTO: 0.2 % (ref 0–0.5)
LYMPHOCYTES # BLD AUTO: 1.43 K/UL (ref 1–4.8)
MCH RBC QN AUTO: 29.2 PG (ref 27–31)
MCHC RBC AUTO-ENTMCNC: 33 G/DL (ref 32–36)
MCV RBC AUTO: 89 FL (ref 82–98)
NUCLEATED RBC (/100WBC) (OHS): 0 /100 WBC
PLATELET # BLD AUTO: 152 K/UL (ref 150–450)
PMV BLD AUTO: 11.1 FL (ref 9.2–12.9)
POTASSIUM SERPL-SCNC: 4.8 MMOL/L (ref 3.5–5.1)
PROT SERPL-MCNC: 7.4 GM/DL (ref 6–8.4)
RBC # BLD AUTO: 4.72 M/UL (ref 4.6–6.2)
RELATIVE EOSINOPHIL (OHS): 7.2 %
RELATIVE LYMPHOCYTE (OHS): 25.1 % (ref 18–48)
RELATIVE MONOCYTE (OHS): 9 % (ref 4–15)
RELATIVE NEUTROPHIL (OHS): 57.1 % (ref 38–73)
SODIUM SERPL-SCNC: 137 MMOL/L (ref 136–145)
WBC # BLD AUTO: 5.69 K/UL (ref 3.9–12.7)

## 2025-05-06 PROCEDURE — 36415 COLL VENOUS BLD VENIPUNCTURE: CPT

## 2025-05-06 PROCEDURE — 82565 ASSAY OF CREATININE: CPT

## 2025-05-06 PROCEDURE — 85025 COMPLETE CBC W/AUTO DIFF WBC: CPT

## 2025-05-15 ENCOUNTER — TELEPHONE (OUTPATIENT)
Dept: HEMATOLOGY/ONCOLOGY | Facility: CLINIC | Age: 89
End: 2025-05-15
Payer: MEDICARE

## 2025-05-15 NOTE — TELEPHONE ENCOUNTER
Returned call to [facility].  Gave them our updated office information for them to change in the system.  She states she will be sending over a few referrals for us.  All matters handled at this time.        Copied from CRM #[id number]. Topic: General Inquiry - Patient Advice  >> May 15, 2025  3:01 PM Ana wrote:  Type: Patient Call Back    Who called: Tonya calling from Dr. Macdonald    What is the request in detail: Please call Tonya to give her Dr. Smis fax number she stated the fax I gave her was not working    Can the clinic reply by [facility]? No    Would the patient rather a call back or a response via [facility]?  Call    Best call back number: [phone]    Additional Information:

## 2025-05-17 ENCOUNTER — HOSPITAL ENCOUNTER (OUTPATIENT)
Dept: RADIOLOGY | Facility: HOSPITAL | Age: 89
Discharge: HOME | End: 2025-05-17
Attending: INTERNAL MEDICINE
Payer: MEDICARE

## 2025-05-17 DIAGNOSIS — C15.9 ESOPHAGEAL CANCER: ICD-10-CM

## 2025-05-17 PROCEDURE — 74177 CT ABD & PELVIS W/CONTRAST: CPT | Mod: TC

## 2025-05-17 PROCEDURE — 74177 CT ABD & PELVIS W/CONTRAST: CPT | Mod: 26,,, | Performed by: RADIOLOGY

## 2025-05-17 PROCEDURE — 71260 CT THORAX DX C+: CPT | Mod: 26,,, | Performed by: RADIOLOGY

## 2025-05-17 PROCEDURE — 25500020 PHARM REV CODE 255: Performed by: INTERNAL MEDICINE

## 2025-05-17 RX ADMIN — IOHEXOL 75 ML: 350 INJECTION, SOLUTION INTRAVENOUS at 12:05

## 2025-05-20 RX ORDER — FLUTICASONE PROPIONATE 50 MCG
1 SPRAY, SUSPENSION (ML) NASAL
COMMUNITY

## 2025-05-20 RX ORDER — ATORVASTATIN CALCIUM 40 MG/1
40 TABLET, FILM COATED ORAL
COMMUNITY

## 2025-05-20 RX ORDER — AMOXICILLIN 500 MG/1
TABLET, FILM COATED ORAL
COMMUNITY
Start: 2025-05-06

## 2025-05-22 ENCOUNTER — OFFICE VISIT (OUTPATIENT)
Dept: HEMATOLOGY/ONCOLOGY | Facility: CLINIC | Age: 89
End: 2025-05-22
Payer: MEDICARE

## 2025-05-22 VITALS
BODY MASS INDEX: 22.58 KG/M2 | SYSTOLIC BLOOD PRESSURE: 122 MMHG | OXYGEN SATURATION: 99 % | RESPIRATION RATE: 20 BRPM | DIASTOLIC BLOOD PRESSURE: 55 MMHG | WEIGHT: 157.75 LBS | HEIGHT: 70 IN | HEART RATE: 55 BPM | TEMPERATURE: 98 F

## 2025-05-22 DIAGNOSIS — C15.9 ESOPHAGEAL ADENOCARCINOMA: Primary | ICD-10-CM

## 2025-05-22 PROCEDURE — 3288F FALL RISK ASSESSMENT DOCD: CPT | Mod: CPTII,S$GLB,, | Performed by: INTERNAL MEDICINE

## 2025-05-22 PROCEDURE — 1159F MED LIST DOCD IN RCRD: CPT | Mod: CPTII,S$GLB,, | Performed by: INTERNAL MEDICINE

## 2025-05-22 PROCEDURE — 1160F RVW MEDS BY RX/DR IN RCRD: CPT | Mod: CPTII,S$GLB,, | Performed by: INTERNAL MEDICINE

## 2025-05-22 PROCEDURE — 1101F PT FALLS ASSESS-DOCD LE1/YR: CPT | Mod: CPTII,S$GLB,, | Performed by: INTERNAL MEDICINE

## 2025-05-22 PROCEDURE — 99205 OFFICE O/P NEW HI 60 MIN: CPT | Mod: S$GLB,,, | Performed by: INTERNAL MEDICINE

## 2025-05-22 PROCEDURE — 1126F AMNT PAIN NOTED NONE PRSNT: CPT | Mod: CPTII,S$GLB,, | Performed by: INTERNAL MEDICINE

## 2025-05-22 PROCEDURE — G2211 COMPLEX E/M VISIT ADD ON: HCPCS | Mod: S$GLB,,, | Performed by: INTERNAL MEDICINE

## 2025-05-22 PROCEDURE — 99999 PR PBB SHADOW E&M-EST. PATIENT-LVL V: CPT | Mod: PBBFAC,,, | Performed by: INTERNAL MEDICINE

## 2025-05-22 NOTE — PROGRESS NOTES
REASON FOR REFERRAL:Esophageal adenocarcinoma.     HPI: Mr Garsia is referrred here for medical oncology eval of recently diagnosed adenocarcinomma of esophagus.  He has a history of coronary artery disease and CHF and last fall was started on Jarddiance and subsquently lost 10 lbs then leveled off. There was a subsequent loss of an additional 10 lbs. An upper and lower Gi was performed which revealed an abnormllity in the midesophagus with biopsy revealing adenocarcinoma.  CT scan of C/A/P revealed evidence of mediastinal adenopathy. He had EU with usman bio[psy performed May 20 and path of mediastinal node was negative for malignancy. He presents here accompanied by his wife. He has no major complaints. He has no dysphagia or odynophagia. No N/V or early satiety reported.     PAST MEDICAL HISTORY:He has HTN. He has had CABG and prosthetic aortic valve. He has PVD. He has BPH. He has a hx of localized bladder tumor removed with TURBT and ws not given BCG.   MEDICATIONS:   ALLERGIES:   SOCIAL HISTORY: He was born and raised in [place] and lived here most of his  life . He moved back from [facility]  to be near [facility]. He is marriesd and lives with his wife in [facility]. He drinks beer and wine daily. Stopped smoking in . No drug use or abuse. He is retired as an  and construction.  FAMILY HISTORY:  Mother  in her 70s from colon cancer. Father  early from a poisoning. He has an older brother and older sister both . Brother had MI in his 50s. His sister  in her 30s from self inflicted GSW. He has 2 daugheter and 3 sons  generally healthy    REVIEW OF SYSTEMS:  Gen: There are no fevers or chills.  There is no weight loss.  There is no fatigue.  The appetite is normal.  Head/Skull: There are no headaches.  Eyes: There is no blurred or double vision.  There is no eye pain.  There is no glaucoma or cataracts.  Ears: There is no loss of hearing, pain,  tinnitus, vertigo  Nasal: There are no nosebleeds  Mouth:  There is no sore throat.  There are no mouth sores.  There is no hoarseness.  Cardiovascular: There is no chest pain.  There are no palpitations.  There is no paroxysmal nocturnal dyspnea.  There is no orthopnea.  > He has had claudication for years  Pulmonary: There is no shortness breath at rest or with exertion.  There is no wheezing.  There is no hemoptysis.  GI: There is no dysphagia or odynophagia.  There is no nausea or vomiting.  There is no abdominal pain.  There is no constipation or diarrhea.  There is no melena or hematochezia.  Genitourinary: There is no dysuria, hematuria or urinary incontinence.  There is no hesitancy.    Musculoskeletal: There are no bone or joint pains.  There is no back pain reported.  Neurologic: There is no history of strokes or seizures.  There are no neuropathic symptoms.  There is no history of gait or speech disturbance.  Dermatologic: There are no rashes or pruritus.  There are no sores that will not heal.  There are no changes in moles or atypical appearing pigmented lesions.  Endocrine: There is no heat or cold intolerance.  There is no polydipsia, polyphagia, or polyuria.  Psychiatric: There is no history of anxiety.  There is no history of depression.  There is no history of psychiatric hospitalization.    Hematologic: There is no history of hemoglobinopathy or thalassemia.  There is no known bleeding diathesis or clotting disorder.  Male genitalia: There is no penal ulceration discharge.  There are no testicular masses.    Physical Exam:  VS: 122/55      55        18       97.9         71.5  Gen: Awake and Alert, No Apparent Distress  HEENT: NCAT, PERRLA, EOMI, , Moist oral, nasal and ocular mucus membranes, OP Clear  Neck: Supple, No JVD or Adenopathy. Trachea is Midline, No Thyromegaly, No thyroid masses  Heart: Regular Rhythm and rate, II/VI systolic murmur  Lungs: Symmetric chest excursions bilaterally. No  use of accessory respiratory muscles. Lungs are clear to auscultation bilaterally  Abdomen: Soft, Nontender/Nondistended. There are normoactive bowel sounds. There is no hepatosplenomegaly  Extremities:  There is no peripheral edema. No joint deformities, joint effusions or joint tenderness in the ankles, knees, hips, shoulders, elbows, wristst and digits. There is no cyanosis. 2+ radia. Nonpalpable pedal pulses.  Mild interossei wasting  Back: Straight, No paraspinal tenderness or CVA tenderness  Skin: Intact. No sites of breakdown. No subcutaneous nodules. No rashes. No petechiae. Xavier's purpura  Lymphatics: No cervical, supraclavicular, axillary or inguinal adenopathy  Neurologic: Awake, alert and fully oriented. Cranial nerves II-XII intact.     Laboratory Data:   Results for orders placed or performed in visit on 05/06/25   Comprehensive Metabolic Panel    Collection Time: 05/06/25  4:42 PM   Result Value Ref Range    Sodium 137 136 - 145 mmol/L    Potassium 4.8 3.5 - 5.1 mmol/L    Chloride 104 95 - 110 mmol/L    CO2 22 (L) 23 - 29 mmol/L    Glucose 90 70 - 110 mg/dL    BUN 45 (H) 8 - 23 mg/dL    Creatinine 1.4 0.5 - 1.4 mg/dL    Calcium 9.0 8.7 - 10.5 mg/dL    Protein Total 7.4 6.0 - 8.4 gm/dL    Albumin 3.5 3.5 - 5.2 g/dL    Bilirubin Total 0.8 0.1 - 1.0 mg/dL     (H) 40 - 150 unit/L    AST 26 11 - 45 unit/L    ALT 29 10 - 44 unit/L    Anion Gap 11 8 - 16 mmol/L    eGFR 48 (L) >60 mL/min/1.73/m2   CBC with Differential    Collection Time: 05/06/25  4:42 PM   Result Value Ref Range    WBC 5.69 3.90 - 12.70 K/uL    RBC 4.72 4.60 - 6.20 M/uL    HGB 13.8 (L) 14.0 - 18.0 gm/dL    HCT 41.8 40.0 - 54.0 %    MCV 89 82 - 98 fL    MCH 29.2 27.0 - 31.0 pg    MCHC 33.0 32.0 - 36.0 g/dL    RDW 15.8 (H) 11.5 - 14.5 %    Platelet Count 152 150 - 450 K/uL    MPV 11.1 9.2 - 12.9 fL    Nucleated RBC 0 <=0 /100 WBC    Neut % 57.1 38 - 73 %    Lymph % 25.1 18 - 48 %    Mono % 9.0 4 - 15 %    Eos % 7.2 <=8 %    Basophil  % 1.4 <=1.9 %    Imm Grans % 0.2 0.0 - 0.5 %    Neut # 3.25 1.8 - 7.7 K/uL    Lymph # 1.43 1 - 4.8 K/uL    Mono # 0.51 0.3 - 1 K/uL    Eos # 0.41 <=0.5 K/uL    Baso # 0.08 <=0.2 K/uL    Imm Grans # 0.01 0.00 - 0.04 K/uL     Mr Purcell is an 89 yo man who has recently been diagnosed with early esophageal adenocarcinoma of te midesophagus. He has had EUS with findings of noncircumferential disease with submucosal involvement.Consideration can be made for endoscopic resection. Will refer back to GI. Elena obtain biomarkers including Claudin 18.2, Her-2 anabell, PDL1, MSI  data.

## 2025-05-23 ENCOUNTER — TELEPHONE (OUTPATIENT)
Dept: GASTROENTEROLOGY | Facility: CLINIC | Age: 89
End: 2025-05-23
Payer: MEDICARE

## 2025-05-23 ENCOUNTER — TELEPHONE (OUTPATIENT)
Dept: HEMATOLOGY/ONCOLOGY | Facility: CLINIC | Age: 89
End: 2025-05-23
Payer: MEDICARE

## 2025-05-23 NOTE — TELEPHONE ENCOUNTER
Spoke with pt's wife, Dr. King.  Informed her I sent pt's records and referral for AES MD review and will contact her upon review with MD recommendations  She states they are interested in scheduling a clinic visit to discuss options further.

## 2025-05-23 NOTE — TELEPHONE ENCOUNTER
----- Message from Alisha sent at 5/23/2025 11:04 AM CDT -----  Regarding: no availbility  Contact: Pt @ [phone]  Pt called in to schedule an appt; no available appts in Epic. Pt is asking for a call back soon to schedule. Thanks. Patient's DX:C15.9 (ICD-10-CM) - Esophageal adenocarcinoma

## 2025-05-23 NOTE — TELEPHONE ENCOUNTER
Additional orders successfully faxed to [facility].  All matters handled at this time.      ----- Message from [employer] sent at 5/23/2025 12:16 PM CDT -----  Message Type: Return Call    Who Called: Christy with Pathology lab at [facility] is calling on behalf of  KARI RAZO [[id number]]     Who Left Message for Patient: Someone from Dr Sims office     Does the patient know what this is regarding? Yes. They are calling to have fax sent to office for additional testing called tumor markers.      Best Call Back Number: [phone]       Additional Information: Patient is returning a call.  Please assist.

## 2025-06-02 ENCOUNTER — TELEPHONE (OUTPATIENT)
Dept: GASTROENTEROLOGY | Facility: CLINIC | Age: 89
End: 2025-06-02
Payer: MEDICARE

## 2025-06-02 ENCOUNTER — TELEPHONE (OUTPATIENT)
Dept: HEMATOLOGY/ONCOLOGY | Facility: CLINIC | Age: 89
End: 2025-06-02
Payer: MEDICARE

## 2025-06-02 ENCOUNTER — PATIENT MESSAGE (OUTPATIENT)
Dept: HEMATOLOGY/ONCOLOGY | Facility: CLINIC | Age: 89
End: 2025-06-02
Payer: MEDICARE

## 2025-06-02 ENCOUNTER — TELEPHONE ENCOUNTER (OUTPATIENT)
Dept: URBAN - METROPOLITAN AREA CLINIC 97 | Facility: CLINIC | Age: 89
End: 2025-06-02

## 2025-06-02 DIAGNOSIS — C15.9 ESOPHAGEAL ADENOCARCINOMA: Primary | ICD-10-CM

## 2025-06-05 ENCOUNTER — TELEPHONE (OUTPATIENT)
Dept: GASTROENTEROLOGY | Facility: CLINIC | Age: 89
End: 2025-06-05
Payer: MEDICARE

## 2025-06-05 ENCOUNTER — OFFICE VISIT (OUTPATIENT)
Dept: GASTROENTEROLOGY | Facility: CLINIC | Age: 89
End: 2025-06-05
Payer: MEDICARE

## 2025-06-05 VITALS — WEIGHT: 155.88 LBS | HEIGHT: 70 IN | BODY MASS INDEX: 22.32 KG/M2

## 2025-06-05 DIAGNOSIS — C15.4 MALIGNANT NEOPLASM OF MIDDLE THIRD OF ESOPHAGUS: Primary | ICD-10-CM

## 2025-06-05 PROCEDURE — 99999 PR PBB SHADOW E&M-EST. PATIENT-LVL III: CPT | Mod: PBBFAC,,, | Performed by: INTERNAL MEDICINE

## 2025-06-06 ENCOUNTER — TELEPHONE (OUTPATIENT)
Dept: GASTROENTEROLOGY | Facility: CLINIC | Age: 89
End: 2025-06-06
Payer: MEDICARE

## 2025-06-06 ENCOUNTER — PATIENT MESSAGE (OUTPATIENT)
Dept: GASTROENTEROLOGY | Facility: CLINIC | Age: 89
End: 2025-06-06
Payer: MEDICARE

## 2025-06-09 ENCOUNTER — TELEPHONE (OUTPATIENT)
Dept: HEMATOLOGY/ONCOLOGY | Facility: CLINIC | Age: 89
End: 2025-06-09
Payer: MEDICARE

## 2025-06-09 ENCOUNTER — TELEPHONE (OUTPATIENT)
Dept: GASTROENTEROLOGY | Facility: CLINIC | Age: 89
End: 2025-06-09
Payer: MEDICARE

## 2025-06-09 NOTE — TELEPHONE ENCOUNTER
Copied from CRM #[id number]. Topic: General Inquiry - Patient Advice  >> Jun 9, 2025  8:10 AM Yovani wrote:  Urgent Consult/Advisory     Name Of Caller: Dr. Medina      Contact Preference: [phone]    Nature of call:Following up on message Friday,  PET scan  to be sent ot fax number [phone]. Patient is scheduled for Tuesday and orders has not been faxed. Please call to advise thank you

## 2025-06-09 NOTE — TELEPHONE ENCOUNTER
Returned call to Dr. King, she stated she called earlier as she was having trouble reaching Dr. Davis and called to see if we could order a PET/CT scan however she says she was able to get in touch with Dr. Davis. All issues had been resolved at the time of my call to Dr. King.     Copied from CRM #[id number]. Topic: General Inquiry - Patient Advice  >> Jun 9, 2025  8:29 AM Karine wrote:  Name of Caller:   Pt's spouse      Treating Physician: Stephen Sims MD      Contact Preference: [phone]     Nature of Call:  Requesting a call back no details given why states time sensitive

## 2025-06-16 ENCOUNTER — TUMOR BOARD CONFERENCE (OUTPATIENT)
Dept: SURGERY | Facility: CLINIC | Age: 89
End: 2025-06-16
Payer: MEDICARE

## 2025-06-16 DIAGNOSIS — C15.9 ESOPHAGEAL ADENOCARCINOMA: Primary | ICD-10-CM

## 2025-06-16 NOTE — PROGRESS NOTES
[facility] UGI MULTIDISCIPLINARY TUMOR BOARD  PATIENT REVIEW FORM   ____________________________________________________________    CLINIC #: [id number]  DATE: 6/16/2025    DIAGNOSIS: esophageal CA    PRESENTER: Ryan    PATIENT SUMMARY:   This 89 y/o gentleman presented with weight loss. Denies any dysphagia. He underwent outside EGD in April 2025 per Dr. Macdonald, who found nodularity in mid esophagus. This was biopsied, and pathology revealed  moderately differentiated VANESSA. He then had outside EUS in 5/2025 per Dr. Cheung, staged uT1bN1. Periesophageal lymph node was biopsied and pathology was benign.   He was evaluated by Dr. Sims.   Reviewed staging PET- no evidence of primary or distant metastatic disease    BOARD RECOMMENDATIONS:   Repeat EUS and consider ESD/ EMR     CONSULT NEEDED:     [] Surgery    [] Hem/Onc    [] Rad/Onc    [] Dietary                 [] Genetics    [] Psychology       [x] AES  [] Interventional Radiology     Clinical Stage: Tumor 1b Node(s) 1 Metastasis 0      GROUP STAGE:  [] O    [] 1A    [] IB    [] IIA    [] IIB     [] IIIA     [] IIIB     [] IIIC    []IV  [] Local recurrence     [] Regional recurrence     [] Distant recurrence   Metastatic site(s): none         [x] [facility] Treatment Guidelines reviewed and care planned is consistent with guidelines.         (i.e., NCCN, NCI, PD, ACO, AUA, etc.)    PRESENTATION AT CANCER CONFERENCE:         [x] Prospective    [] Retrospective     [] Follow-Up

## 2025-06-18 ENCOUNTER — TELEPHONE (OUTPATIENT)
Dept: GASTROENTEROLOGY | Facility: CLINIC | Age: 89
End: 2025-06-18
Payer: MEDICARE

## 2025-06-18 DIAGNOSIS — C15.9 ESOPHAGEAL ADENOCARCINOMA: Primary | ICD-10-CM

## 2025-06-18 NOTE — TELEPHONE ENCOUNTER
Patient is scheduled for a Upper Endoscopy Ultrasound (EUS)/ESD on 06/25/2025 with Dr. AMELIA Gonzalez  [facility]    Message sent to Dr. Tobin's staff for cardiac clearance.

## 2025-06-20 ENCOUNTER — TELEPHONE (OUTPATIENT)
Dept: GASTROENTEROLOGY | Facility: CLINIC | Age: 89
End: 2025-06-20
Payer: MEDICARE

## 2025-06-25 ENCOUNTER — ANESTHESIA EVENT (OUTPATIENT)
Dept: ENDOSCOPY | Facility: HOSPITAL | Age: 89
End: 2025-06-25
Payer: MEDICARE

## 2025-06-25 ENCOUNTER — HOSPITAL ENCOUNTER (OUTPATIENT)
Facility: HOSPITAL | Age: 89
Discharge: HOME | End: 2025-06-25
Attending: INTERNAL MEDICINE | Admitting: INTERNAL MEDICINE
Payer: MEDICARE

## 2025-06-25 ENCOUNTER — TELEPHONE (OUTPATIENT)
Dept: CARDIOLOGY | Facility: CLINIC | Age: 89
End: 2025-06-25
Payer: MEDICARE

## 2025-06-25 ENCOUNTER — ANESTHESIA (OUTPATIENT)
Dept: ENDOSCOPY | Facility: HOSPITAL | Age: 89
End: 2025-06-25
Payer: MEDICARE

## 2025-06-25 VITALS
WEIGHT: 155 LBS | BODY MASS INDEX: 22.19 KG/M2 | HEIGHT: 70 IN | SYSTOLIC BLOOD PRESSURE: 157 MMHG | RESPIRATION RATE: 18 BRPM | OXYGEN SATURATION: 96 % | TEMPERATURE: 98 F | HEART RATE: 77 BPM | DIASTOLIC BLOOD PRESSURE: 68 MMHG

## 2025-06-25 DIAGNOSIS — C15.9 ESOPHAGEAL ADENOCARCINOMA: Primary | ICD-10-CM

## 2025-06-25 PROCEDURE — 27200967 HC COAGRASPER: Performed by: INTERNAL MEDICINE

## 2025-06-25 PROCEDURE — 88341 IMHCHEM/IMCYTCHM EA ADD ANTB: CPT | Mod: TC,59 | Performed by: INTERNAL MEDICINE

## 2025-06-25 PROCEDURE — 37000009 HC ANESTHESIA EA ADD 15 MINS: Performed by: INTERNAL MEDICINE

## 2025-06-25 PROCEDURE — 27201028 HC NEEDLE, SCLERO: Performed by: INTERNAL MEDICINE

## 2025-06-25 PROCEDURE — 43259 EGD US EXAM DUODENUM/JEJUNUM: CPT | Performed by: INTERNAL MEDICINE

## 2025-06-25 PROCEDURE — 88360 TUMOR IMMUNOHISTOCHEM/MANUAL: CPT | Performed by: INTERNAL MEDICINE

## 2025-06-25 PROCEDURE — 27201042 HC RETRIEVAL NET: Performed by: INTERNAL MEDICINE

## 2025-06-25 PROCEDURE — 27202363 HC INJECTION AGENT, SUBMUCOSAL, ANY: Performed by: INTERNAL MEDICINE

## 2025-06-25 PROCEDURE — 27202087 HC PROBE, APC: Performed by: INTERNAL MEDICINE

## 2025-06-25 PROCEDURE — C1052 HEMOSTATIC AGENT, GI, TOPIC: HCPCS | Performed by: INTERNAL MEDICINE

## 2025-06-25 PROCEDURE — 43259 EGD US EXAM DUODENUM/JEJUNUM: CPT | Mod: 22,,, | Performed by: INTERNAL MEDICINE

## 2025-06-25 PROCEDURE — 43499 UNLISTED PROCEDURE ESOPHAGUS: CPT | Performed by: INTERNAL MEDICINE

## 2025-06-25 PROCEDURE — 43499 UNLISTED PROCEDURE ESOPHAGUS: CPT | Mod: ,,, | Performed by: INTERNAL MEDICINE

## 2025-06-25 PROCEDURE — 37000008 HC ANESTHESIA 1ST 15 MINUTES: Performed by: INTERNAL MEDICINE

## 2025-06-25 PROCEDURE — 27202299 HC NEEDLE KNIFE, TISSUE RESECTION: Performed by: INTERNAL MEDICINE

## 2025-06-25 PROCEDURE — 63600175 PHARM REV CODE 636 W HCPCS: Performed by: STUDENT IN AN ORGANIZED HEALTH CARE EDUCATION/TRAINING PROGRAM

## 2025-06-25 PROCEDURE — 25000003 PHARM REV CODE 250: Performed by: STUDENT IN AN ORGANIZED HEALTH CARE EDUCATION/TRAINING PROGRAM

## 2025-06-25 RX ORDER — PHENYLEPHRINE HYDROCHLORIDE 10 MG/ML
INJECTION INTRAVENOUS
Status: DISCONTINUED | OUTPATIENT
Start: 2025-06-25 | End: 2025-06-25

## 2025-06-25 RX ORDER — LOSARTAN POTASSIUM 50 MG/1
50 TABLET ORAL DAILY
COMMUNITY

## 2025-06-25 RX ORDER — AMOXICILLIN AND CLAVULANATE POTASSIUM 875; 125 MG/1; MG/1
1 TABLET, FILM COATED ORAL EVERY 12 HOURS
Qty: 10 TABLET | Refills: 0 | Status: SHIPPED | OUTPATIENT
Start: 2025-06-25 | End: 2025-06-30

## 2025-06-25 RX ORDER — FENTANYL CITRATE 50 UG/ML
25 INJECTION, SOLUTION INTRAMUSCULAR; INTRAVENOUS EVERY 5 MIN PRN
Status: DISCONTINUED | OUTPATIENT
Start: 2025-06-25 | End: 2025-06-25 | Stop reason: HOSPADM

## 2025-06-25 RX ORDER — LIDOCAINE HYDROCHLORIDE 20 MG/ML
INJECTION INTRAVENOUS
Status: DISCONTINUED | OUTPATIENT
Start: 2025-06-25 | End: 2025-06-25

## 2025-06-25 RX ORDER — ROCURONIUM BROMIDE 10 MG/ML
INJECTION, SOLUTION INTRAVENOUS
Status: DISCONTINUED | OUTPATIENT
Start: 2025-06-25 | End: 2025-06-25

## 2025-06-25 RX ORDER — ONDANSETRON HYDROCHLORIDE 2 MG/ML
INJECTION, SOLUTION INTRAVENOUS
Status: DISCONTINUED | OUTPATIENT
Start: 2025-06-25 | End: 2025-06-25

## 2025-06-25 RX ORDER — HALOPERIDOL LACTATE 5 MG/ML
0.5 INJECTION, SOLUTION INTRAMUSCULAR EVERY 10 MIN PRN
Status: DISCONTINUED | OUTPATIENT
Start: 2025-06-25 | End: 2025-06-25 | Stop reason: HOSPADM

## 2025-06-25 RX ORDER — SUCRALFATE 1 G/10ML
SUSPENSION ORAL
Qty: 280 ML | Refills: 0 | Status: SHIPPED | OUTPATIENT
Start: 2025-06-25

## 2025-06-25 RX ORDER — SODIUM CHLORIDE 0.9 % (FLUSH) 0.9 %
3 SYRINGE (ML) INJECTION
Status: DISCONTINUED | OUTPATIENT
Start: 2025-06-25 | End: 2025-06-25 | Stop reason: HOSPADM

## 2025-06-25 RX ORDER — ETOMIDATE 2 MG/ML
INJECTION INTRAVENOUS
Status: DISCONTINUED | OUTPATIENT
Start: 2025-06-25 | End: 2025-06-25

## 2025-06-25 RX ORDER — GLUCAGON 1 MG
1 KIT INJECTION
Status: DISCONTINUED | OUTPATIENT
Start: 2025-06-25 | End: 2025-06-25 | Stop reason: HOSPADM

## 2025-06-25 RX ORDER — FENTANYL CITRATE 50 UG/ML
INJECTION, SOLUTION INTRAMUSCULAR; INTRAVENOUS
Status: DISCONTINUED | OUTPATIENT
Start: 2025-06-25 | End: 2025-06-25

## 2025-06-25 RX ORDER — PANTOPRAZOLE SODIUM 40 MG/1
40 TABLET, DELAYED RELEASE ORAL 2 TIMES DAILY
Qty: 60 TABLET | Refills: 0 | Status: SHIPPED | OUTPATIENT
Start: 2025-06-25 | End: 2025-07-25

## 2025-06-25 RX ORDER — HYDROCODONE BITARTRATE AND ACETAMINOPHEN 7.5; 325 MG/15ML; MG/15ML
15 SOLUTION ORAL EVERY 6 HOURS PRN
Qty: 180 ML | Refills: 0 | Status: SHIPPED | OUTPATIENT
Start: 2025-06-25 | End: 2025-06-28

## 2025-06-25 RX ADMIN — SUGAMMADEX 200 MG: 100 INJECTION, SOLUTION INTRAVENOUS at 04:06

## 2025-06-25 RX ADMIN — ROCURONIUM BROMIDE 20 MG: 10 INJECTION, SOLUTION INTRAVENOUS at 03:06

## 2025-06-25 RX ADMIN — PHENYLEPHRINE HYDROCHLORIDE 200 MCG: 10 INJECTION INTRAVENOUS at 01:06

## 2025-06-25 RX ADMIN — ROCURONIUM BROMIDE 50 MG: 10 INJECTION, SOLUTION INTRAVENOUS at 12:06

## 2025-06-25 RX ADMIN — ROCURONIUM BROMIDE 30 MG: 10 INJECTION, SOLUTION INTRAVENOUS at 02:06

## 2025-06-25 RX ADMIN — FENTANYL CITRATE 50 MCG: 50 INJECTION, SOLUTION INTRAMUSCULAR; INTRAVENOUS at 12:06

## 2025-06-25 RX ADMIN — FENTANYL CITRATE 50 MCG: 50 INJECTION, SOLUTION INTRAMUSCULAR; INTRAVENOUS at 02:06

## 2025-06-25 RX ADMIN — GLYCOPYRROLATE 0.2 MG: 0.2 INJECTION, SOLUTION INTRAMUSCULAR; INTRAVENOUS at 03:06

## 2025-06-25 RX ADMIN — SODIUM CHLORIDE: 0.9 INJECTION, SOLUTION INTRAVENOUS at 04:06

## 2025-06-25 RX ADMIN — ROCURONIUM BROMIDE 20 MG: 10 INJECTION, SOLUTION INTRAVENOUS at 01:06

## 2025-06-25 RX ADMIN — PIPERACILLIN SODIUM,TAZOBACTAM SODIUM 4.5 G: 3; .375 INJECTION, POWDER, FOR SOLUTION INTRAVENOUS at 03:06

## 2025-06-25 RX ADMIN — GLYCOPYRROLATE 0.2 MG: 0.2 INJECTION, SOLUTION INTRAMUSCULAR; INTRAVENOUS at 02:06

## 2025-06-25 RX ADMIN — SODIUM CHLORIDE: 0.9 INJECTION, SOLUTION INTRAVENOUS at 12:06

## 2025-06-25 RX ADMIN — ETOMIDATE 20 MG: 2 INJECTION INTRAVENOUS at 12:06

## 2025-06-25 RX ADMIN — ONDANSETRON 4 MG: 2 INJECTION INTRAMUSCULAR; INTRAVENOUS at 12:06

## 2025-06-25 RX ADMIN — LIDOCAINE HYDROCHLORIDE 40 MG: 20 INJECTION INTRAVENOUS at 12:06

## 2025-06-25 RX ADMIN — ROCURONIUM BROMIDE 20 MG: 10 INJECTION, SOLUTION INTRAVENOUS at 02:06

## 2025-06-25 NOTE — PLAN OF CARE
Pt Aox4. VSS. No signs of distress & tolerating PO liquids. Patient and pt's wife educated and given discharge instructions at bedside. Verbalized understanding. IV removed. Pt ready for discharge.

## 2025-06-25 NOTE — DISCHARGE INSTRUCTIONS
Discharge patient to home (ambulatory).   - Clear liquid diet today, then advance as tolerated to advance diet as tolerated.   - No aspirin, ibuprofen, naproxen, or other non-steroidal anti-inflammatory drugs for 2 weeks.   - Await path results.   - Use Protonix (pantoprazole) 40 mg PO BID.   - Use sucralfate suspension 1 gram PO BID for 2 weeks.   - Augmentin (amoxicillin/clavulanate) 875 mg PO BID for 5 days.   - Repeat the upper endoscopic ultrasound in 3 months for surveillance based on pathology results.   - The findings and recommendations were discussed with the patient's family.

## 2025-06-25 NOTE — BRIEF OP NOTE
Full Provation Note to follow      EUS/EGD with ESD    Provider: Bright Gonzalez MD, Fred Oneil, RN, Trina Jo RN, Tracie Penn CRNA    Medication: General Anesthesia, Zosyn 4.5 gram IV.    Endoscopic findings:    EUS  A hypoechoic mass was found in the middle third of the esophagus.  The lesion was partially circumferential (involving one-third of the lumen circumference).  The endosonographic borders were poorly-defined.  The mass measured up to 2.5 mm in thickness. The abnormal area measured approximately 2 cm.  There was sonographic evidence suggesting invasion into the luminal interface/superficial mucosa (Layer 1), the deep mucosa (Layer 2) and unclear if the submucosa (Layer 3).     EGD  The esophagus and gastroesophageal junction were examined with white light and narrow band imaging (NBI) from a forward view and retroflexed position.  There were esophageal mucosal changes classified as Sahu's stage C8-M8 per [place] criteria.  These changes involved the mucosa at the upper extent of the gastric folds (40 cm from the incisors) extending to the Z-line.  Diffuse salmon-colored mucosa was present from 31 to 39 cm.  The maximum longitudinal extent of these esophageal mucosal changes was 8 cm in length.   A large, nodular ulcerated mucosa involving 1/3 of the circumference mass with no bleeding and no stigmata of recent bleeding was found in the middle third of the esophagus, 33 to 36 cm from the incisors.  The mass was non-obstructing and not circumferential.  Preparations were made for endoscopic submucosal dissection.  Demarcation of the lesion was performed with narrow band imaging to clearly identify the boundaries of the lesion.  Ascendo was injected with adequate lift of the lesion from the muscularis propria.  A circumferential incision around the lesion into the submucosa was performed with a needle-knife.  The lesion was then dissected from the underlying deep layers with the  electrocautery knife and retrieved with a Reyes net.  A 30 mm area was resected. Resection and retrieval were complete.  Resected tissue including tissue margins will be examined by histology.  There was no bleeding at the end of the procedure.  Coagulation for hemostasis using coagulation grasper was successful. As preventive measure for bleeding after the submucosal resection Purestat was applied to the site.  There was no bleeding at the end of the procedure.     Impression:    - Hiatal hernia.   - Esophageal mucosal changes classified as Sahu's stage C8-M8 per [place] criteria.   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  Complete removal was accomplished via ESD. Bleeding control with the coagulation grasper  Purestat applied.   - Normal stomach.   - There was no evidence of significant pathology in the visualized portion of the liver.   - Five enlarged lymph nodes were visualized in the middle paraesophageal mediastinum (level 8M).   - A mass was found in the middle third of the esophagus.  A tissue diagnosis was obtained prior to this exam.  This is adenocarcinoma.  This was staged T1 by endosonographic criteria.   - Endoscopic submucosal dissection was performed.  Resection and retrieval were complete.     Recommendations:    - Discharge patient to home (ambulatory).   - Clear liquid diet today, then advance as tolerated to advance diet as tolerated.   - No aspirin, ibuprofen, naproxen, or other non-steroidal anti-inflammatory drugs for 2 weeks.   - Await path results.   - Use Protonix (pantoprazole) 40 mg PO BID.   - Use sucralfate suspension 1 gram PO BID for 2 weeks.   - Augmentin (amoxicillin/clavulanate) 875 mg PO BID for 5 days.   - Repeat the upper endoscopic ultrasound in 3 months for surveillance based on pathology results.   - The findings and recommendations were discussed with the patient's family.

## 2025-06-25 NOTE — ANESTHESIA PREPROCEDURE EVALUATION
"                                                                                                             2025  Chang Purcell is a 89 y.o., male.  Pre-operative evaluation for Procedure(s) (LRB):  ULTRASOUND, UPPER GI TRACT, ENDOSCOPIC (N/A)  DISSECTION, LESION, ESOPHAGUS, STOMACH, OR DUODENUM, SUBMUCOSAL, ENDOSCOPIC (N/A)    Chang Purcell is a 89 y.o. male     Problem List[1]    Review of patient's allergies indicates:  No Known Allergies    Medications Ordered Prior to Encounter[2]    Past Surgical History:   Procedure Laterality Date    APPENDECTOMY      CARDIAC VALVE REPLACEMENT      Bioprosthetic Aortic  valve    CARDIAC VALVE SURGERY      CATARACT EXTRACTION      CATARACT EXTRACTION W/  INTRAOCULAR LENS IMPLANT Right 2023    Procedure: EXTRACTION, CATARACT, WITH IOL INSERTION;  Surgeon: Brian Davis MD;  Location: [facility] OR;  Service: Ophthalmology;  Laterality: Right;    CATARACT EXTRACTION W/  INTRAOCULAR LENS IMPLANT Left 2023    Procedure: EXTRACTION, CATARACT, WITH IOL INSERTION;  Surgeon: Brian Davis MD;  Location: [facility] OR;  Service: Ophthalmology;  Laterality: Left;    CORONARY ARTERY BYPASS GRAFT  2007    x 2       Social History[3]      CBC: No results for input(s): "WBC", "RBC", "HGB", "HCT", "PLT", "MCV", "MCH", "MCHC" in the last 72 hours.    CMP: No results for input(s): "NA", "K", "CL", "CO2", "BUN", "CREATININE", "GLU", "MG", "PHOS", "CALCIUM", "ALBUMIN", "PROT", "ALKPHOS", "ALT", "AST", "BILITOT" in the last 72 hours.    INR  No results for input(s): "PT", "INR", "PROTIME", "APTT" in the last 72 hours.        Diagnostic Studies:      EKD Echo:  Results for orders placed or performed during the hospital encounter of 18   2D Echo w/ Color Flow Doppler    Collection Time: 18  1:45 PM   Result Value Ref Range    EF + QEF 50 55 - 65    Mitral Valve Regurgitation TRIVIAL     Aortic Valve Regurgitation MILD TO MODERATE (A) "     Est. PA Systolic Pressure 24.53     Mitral Valve Mobility NORMAL     Tricuspid Valve Regurgitation TRIVIAL         Pre-op Assessment    I have reviewed the NPO Status.      Review of Systems  Anesthesia Hx:  No problems with previous Anesthesia   History of prior surgery of interest to airway management or planning: heart surgery. Previous anesthesia: General        Denies Family Hx of Anesthesia complications.    Denies Personal Hx of Anesthesia complications.                    Cardiovascular:     Hypertension   CAD   CABG/stent            EF 30%, PA pressure 67                               Physical Exam  General: Well nourished, Cooperative, Alert and Oriented    Airway:  Mallampati: III   Mouth Opening: Normal  TM Distance: Normal  Tongue: Normal  Neck ROM: Normal ROM    Anesthesia Plan  Type of Anesthesia, risks & benefits discussed:    Anesthesia Type: Gen ETT  Intra-op Monitoring Plan: Standard ASA Monitors  Post Op Pain Control Plan: multimodal analgesia  Induction:  IV  Airway Plan: Video, Post-Induction  Informed Consent: Informed consent signed with the Patient and all parties understand the risks and agree with anesthesia plan.  All questions answered.   ASA Score: 4    Ready For Surgery From Anesthesia Perspective.   .             [1]  Patient Active Problem List  Diagnosis    History of benign prostatic hypertrophy    Male erectile disorder    Hematuria    Valvular heart disease    Coronary artery disease involving coronary bypass graft    Decreased hearing    S/P aortic valve replacement (bioprosthetic)    Aortic prosthetic valve regurgitation    Unspecified symptoms and signs involving cognitive functions and awareness    Essential hypertension    MCI (mild cognitive impairment)    Fatigue    Hypertensive urgency    Mixed hyperlipidemia    Nuclear sclerotic cataract of left eye    Heart failure with reduced ejection fraction    Acute combined systolic and diastolic heart failure     Esophageal adenocarcinoma   [2]  No current facility-administered medications on file prior to encounter.     Current Outpatient Medications on File Prior to Encounter   Medication Sig Dispense Refill    amLODIPine (NORVASC) 10 MG tablet Take 1 tablet (10 mg total) by mouth once daily. 90 tablet 3    amoxicillin (AMOXIL) 500 MG Tab SMARTSI Tablet(s) By Mouth      ascorbic acid, vitamin C, (VITAMIN C) 1000 MG tablet Take 1,000 mg by mouth 2 (two) times a day.      aspirin 81 mg Cap Take 81 mg/kg/day by mouth.      atorvastatin (LIPITOR) 40 MG tablet Take 40 mg by mouth.      azelastine (ASTELIN) 137 mcg (0.1 %) nasal spray SMARTSI Spray(s) Both Nares Twice Daily PRN      co-enzyme Q-10 30 mg capsule Take 3 capsules (90 mg total) by mouth 2 (two) times daily.      empagliflozin (JARDIANCE) 10 mg tablet Take 1 tablet (10 mg total) by mouth once daily. 90 tablet 3    finasteride (PROSCAR) 5 mg tablet       fluticasone propionate (FLONASE) 50 mcg/actuation nasal spray 1 spray by Nasal route.      furosemide (LASIX) 40 MG tablet Take 1 tablet (40 mg total) by mouth 2 (two) times daily. 60 tablet 5    ID NOW COVID-19 TEST KIT Kit       Lactobacillus acidophilus (PROBIOTIC) 10 billion cell Cap Take 100 tablets by mouth 2 (two) times a day.      loratadine (CLARITIN) 10 mg tablet Take 10 mg by mouth once daily.      megestroL (MEGACE) 20 MG Tab 1 tablet Orally Twice a day for 30 days      metoprolol succinate (TOPROL-XL) 25 MG 24 hr tablet Take 1 tablet (25 mg total) by mouth once daily. 90 tablet 3    omeprazole (PRILOSEC) 40 MG capsule Take 40 mg by mouth.      PAXLOVID 300 mg (150 mg x 2)-100 mg copackaged tablets (EUA) Take by mouth. (Patient not taking: Reported on 2025)      tamsulosin (FLOMAX) 0.4 mg Cap 1 capsule.     [3]  Social History  Socioeconomic History    Marital status:    Tobacco Use    Smoking status: Former     Current packs/day: 0.00     Types: Cigarettes      Quit date: 1976     Years since quittin.5    Smokeless tobacco: Never   Substance and Sexual Activity    Alcohol use: Yes     Alcohol/week: 14.0 standard drinks of alcohol     Types: 7 Glasses of wine, 7 Cans of beer per week     Comment: socially    Drug use: Never    Sexual activity: Yes     Partners: Female     Social Drivers of Health     Financial Resource Strain: Low Risk  (2025)    Overall Financial Resource Strain (CARDIA)     Difficulty of Paying Living Expenses: Not hard at all   Food Insecurity: No Food Insecurity (2025)    Hunger Vital Sign     Worried About Running Out of Food in the Last Year: Never true     Ran Out of Food in the Last Year: Never true   Transportation Needs: No Transportation Needs (2025)    PRAPARE - Transportation     Lack of Transportation (Medical): No     Lack of Transportation (Non-Medical): No   Physical Activity: Inactive (2025)    Exercise Vital Sign     Days of Exercise per Week: 0 days     Minutes of Exercise per Session: 20 min   Stress: No Stress Concern Present (2025)    [place] [place] [employer] - Occupational Stress Questionnaire     Feeling of Stress : Only a little   Housing Stability: Low Risk  (2025)    Housing Stability Vital Sign     Unable to Pay for Housing in the Last Year: No     Number of Times Moved in the Last Year: 0     Homeless in the Last Year: No

## 2025-06-25 NOTE — PLAN OF CARE
Plan of care reviewed with patient and wife. Ok for pt to take home PO amoxicillin per anesthesia. Pt took with small sip of water in admit

## 2025-06-25 NOTE — H&P
History & Physical - Short Stay  Gastroenterology      SUBJECTIVE:     Procedure: EGD, EUS, and ESD    Chief Complaint/Indication for Procedure: Esophageal cancer    History of Present Illness:  Patient is a 89 y.o. male presents with esophageal cancer possible T1b with negative EUS with FNA of lymph node negative and recent PET scan without evidence of metastasis. Case discussed at the [facility] conference and recommended to repeat his EUS and if no T2 proceed with possible ESD.    PTA Medications   Medication Sig    amoxicillin (AMOXIL) 500 MG Tab SMARTSI Tablet(s) By Mouth    aspirin 81 mg Cap Take 81 mg/kg/day by mouth.    atorvastatin (LIPITOR) 40 MG tablet Take 40 mg by mouth.    co-enzyme Q-10 30 mg capsule Take 3 capsules (90 mg total) by mouth 2 (two) times daily.    finasteride (PROSCAR) 5 mg tablet     furosemide (LASIX) 40 MG tablet Take 1 tablet (40 mg total) by mouth 2 (two) times daily.    losartan (COZAAR) 50 MG tablet Take 50 mg by mouth once daily.    metoprolol succinate (TOPROL-XL) 25 MG 24 hr tablet Take 1 tablet (25 mg total) by mouth once daily.    omeprazole (PRILOSEC) 40 MG capsule Take 40 mg by mouth.    tamsulosin (FLOMAX) 0.4 mg Cap 1 capsule.    amLODIPine (NORVASC) 10 MG tablet Take 1 tablet (10 mg total) by mouth once daily.    ascorbic acid, vitamin C, (VITAMIN C) 1000 MG tablet Take 1,000 mg by mouth 2 (two) times a day.    azelastine (ASTELIN) 137 mcg (0.1 %) nasal spray SMARTSI Spray(s) Both Nares Twice Daily PRN    empagliflozin (JARDIANCE) 10 mg tablet Take 1 tablet (10 mg total) by mouth once daily.    fluticasone propionate (FLONASE) 50 mcg/actuation nasal spray 1 spray by Nasal route.    ID NOW COVID-19 TEST KIT Kit     Lactobacillus acidophilus (PROBIOTIC) 10 billion cell Cap Take 100 tablets by mouth 2 (two) times a day.    loratadine (CLARITIN) 10 mg tablet Take 10 mg by mouth once daily.    megestroL (MEGACE) 20 MG Tab 1 tablet Orally Twice a day for 30 days    PAXLOVID  "300 mg (150 mg x 2)-100 mg copackaged tablets (EUA) Take by mouth. (Patient not taking: Reported on 5/22/2025)       Review of patient's allergies indicates:  No Known Allergies     Past Medical History:   Diagnosis Date    Coronary artery disease     Heart murmur     Hyperlipidemia     Hypertension     Joint pain ~2 months    Trigger finger    Mechanical heart valve present     AORTIC VALVE    Skin disease ~1 month    Axillary & L thigh itchy rash    Valvular regurgitation      Past Surgical History:   Procedure Laterality Date    APPENDECTOMY  1950    CARDIAC VALVE REPLACEMENT  2007    Bioprosthetic Aortic  valve    CARDIAC VALVE SURGERY  2007    CATARACT EXTRACTION      CATARACT EXTRACTION W/  INTRAOCULAR LENS IMPLANT Right 03/06/2023    Procedure: EXTRACTION, CATARACT, WITH IOL INSERTION;  Surgeon: Brian Davis MD;  Location: [facility] OR;  Service: Ophthalmology;  Laterality: Right;    CATARACT EXTRACTION W/  INTRAOCULAR LENS IMPLANT Left 03/20/2023    Procedure: EXTRACTION, CATARACT, WITH IOL INSERTION;  Surgeon: Brian Davis MD;  Location: [facility] OR;  Service: Ophthalmology;  Laterality: Left;    CORONARY ARTERY BYPASS GRAFT  2007    x 2     Family History   Problem Relation Name Age of Onset    Cancer Mother Anu         Colon    Depression Brother Enrrique         Years ago    Heart attack Neg Hx      Heart disease Neg Hx      Hyperlipidemia Neg Hx      Hypertension Neg Hx      Melanoma Neg Hx       Social History[1]    Review of Systems:  Respiratory: no cough or shortness of breath  Cardiovascular: no chest pain or palpitations    OBJECTIVE:     Vital Signs (Most Recent)  Temp: 98.1 °F (36.7 °C) (06/25/25 1136)  Pulse: 86 (06/25/25 1136)  Resp: 16 (06/25/25 1136)  BP: (!) 162/80 (06/25/25 1136)  SpO2: 97 % (06/25/25 1136)    Physical Exam:  General: well developed, well nourished  Lungs:  normal respiratory effort  Heart: regular rate, S1, S2 normal    Laboratory  CBC: No results for input(s): "WBC", "RBC", " ""HGB", "HCT", "PLT", "MCV", "MCH", "MCHC" in the last 168 hours.  CMP: No results for input(s): "GLU", "CALCIUM", "ALBUMIN", "PROT", "NA", "K", "CO2", "CL", "BUN", "CREATININE", "ALKPHOS", "ALT", "AST", "BILITOT" in the last 168 hours.  Coagulation: No results for input(s): "LABPROT", "INR", "APTT" in the last 168 hours.      Diagnostic Results:  PET Scan on 2025  FINDINGS:   Head and neck: There is symmetric and physiologic distribution of radiotracer throughout the included brain. There is no hemorrhage, hydrocephalus, or midline shift. There is no FDG avid cervical lymphadenopathy.     CHEST: There is no FDG avid mediastinal or hilar lymphadenopathy. There is no abnormal hypermetabolic activity with in the esophagus. There is no FDG avid pulmonary nodule or mass. There is no pleural effusion.     Abdomen and pelvis: There is physiologic radiotracer throughout the liver, spleen, and collecting system. There is no FDG avid pelvic or retroperitoneal adenopathy.     MUSCULOSKELETAL: There is no FDG avid lytic or blastic osseous lesion.     ASSESSMENT/PLAN:     Esophageal cancer possible T1b with negative PET scan for metastatic disease. Case discussed at the [facility]     Plan: EGD, EUS, and possible ESD    Anesthesia Plan: General    ASA Grade: ASA 3 - Patient with moderate systemic disease with functional limitations    The impression and plan was discussed in detail with the patient and family. All questions have been answered and the patient voices understanding of our plan at this point. The risk of the procedure was discussed in detail which includes but not limited to bleeding, infection, perforation in some cases requiring surgery with its spectrum of complications.              [1]   Social History  Tobacco Use    Smoking status: Former     Current packs/day: 0.00     Types: Cigarettes     Quit date: 1976     Years since quittin.5    Smokeless tobacco: Never   Vaping Use    Vaping status: Never Used "   Substance Use Topics    Alcohol use: Yes     Alcohol/week: 14.0 standard drinks of alcohol     Types: 7 Glasses of wine, 7 Cans of beer per week     Comment: socially    Drug use: Never

## 2025-06-25 NOTE — TRANSFER OF CARE
"Anesthesia Transfer of Care Note    Patient: Chang Purcell    Procedure(s) Performed: Procedure(s) (LRB):  ULTRASOUND, UPPER GI TRACT, ENDOSCOPIC (N/A)  DISSECTION, LESION, ESOPHAGUS, STOMACH, OR DUODENUM, SUBMUCOSAL, ENDOSCOPIC (N/A)    Patient location: [facility]    Anesthesia Type: general    Transport from OR: Transported from OR on 6-10 L/min O2 by face mask with adequate spontaneous ventilation    Post pain: adequate analgesia    Post assessment: no apparent anesthetic complications and tolerated procedure well    Post vital signs: stable    Level of consciousness: awake and alert    Nausea/Vomiting: no nausea/vomiting    Complications: none    Transfer of care protocol was followed      Last vitals: Visit Vitals  BP (!) 162/80 (BP Location: Left arm, Patient Position: Lying)   Pulse 86   Temp 36.7 °C (98.1 °F) (Oral)   Resp 16   Ht 5' 10" (1.778 m)   Wt 70.3 kg (155 lb)   SpO2 97%   BMI 22.24 kg/m²     "

## 2025-06-25 NOTE — ANESTHESIA POSTPROCEDURE EVALUATION
Anesthesia Post Evaluation    Patient: Chang Purcell    Procedure(s) Performed: Procedure(s) (LRB):  ULTRASOUND, UPPER GI TRACT, ENDOSCOPIC (N/A)  DISSECTION, LESION, ESOPHAGUS, STOMACH, OR DUODENUM, SUBMUCOSAL, ENDOSCOPIC (N/A)    Final Anesthesia Type: general      Patient location during evaluation: PACU  Patient participation: Yes- Able to Participate  Level of consciousness: awake and alert  Post-procedure vital signs: reviewed and stable  Pain management: adequate  Airway patency: patent    PONV status at discharge: No PONV  Anesthetic complications: no      Cardiovascular status: blood pressure returned to baseline  Respiratory status: unassisted  Hydration status: euvolemic  Follow-up not needed.          Vitals Value Taken Time   /76 06/25/25 17:32   Temp 36.4 °C (97.5 °F) 06/25/25 16:45   Pulse 72 06/25/25 17:32   Resp 25 06/25/25 17:32   SpO2 92 % 06/25/25 17:32   Vitals shown include unfiled device data.      No case tracking events are documented in the log.      Pain/Tin Score: Tin Score: 10 (6/25/2025  5:00 PM)

## 2025-06-26 NOTE — [employer]ATION PATIENT INSTRUCTIONS
Discharge Summary/Instructions after an Endoscopic Procedure  Patient Name: Chang Purcell  Patient MRN: [id number]  Patient YOB: 1935  Wednesday, June 25, 2025  Bright Gonzalez MD  Dear patient,  As a result of recent federal legislation (The Federal Cures Act), you may   receive lab or pathology results from your procedure in your [facility]   account before your physician is able to contact you. Your physician or   their representative will relay the results to you with their   recommendations at their soonest availability.  Thank you,  RESTRICTIONS:  During your procedure today, you received medications for sedation.  These   medications may affect your judgment, balance and coordination.  Therefore,   for 24 hours, you have the following restrictions:   - DO NOT drive a car, operate machinery, make legal/financial decisions,   sign important papers or drink alcohol.    ACTIVITY:  Today: no heavy lifting, straining or running due to procedural   sedation/anesthesia.  The following day: return to full activity including work.  DIET:  Eat and drink normally unless instructed otherwise.     TREATMENT FOR COMMON SIDE EFFECTS:  - Mild abdominal pain, nausea, belching, bloating or excessive gas:  rest,   eat lightly and use a heating pad.  - Sore Throat: treat with throat lozenges and/or gargle with warm salt   water.  - Because air was used during the procedure, expelling large amounts of air   from your rectum or belching is normal.  - If a bowel prep was taken, you may not have a bowel movement for 1-3 days.    This is normal.  SYMPTOMS TO WATCH FOR AND REPORT TO YOUR PHYSICIAN:  1. Abdominal pain or bloating, other than gas cramps.  2. Chest pain.  3. Back pain.  4. Signs of infection such as: chills or fever occurring within 24 hours   after the procedure.  5. Rectal bleeding, which would show as bright red, maroon, or black stools.   (A tablespoon of blood from the rectum is not serious, especially  if   hemorrhoids are present.)  6. Vomiting.  7. Weakness or dizziness.  GO DIRECTLY TO THE NEAREST EMERGENCY ROOM IF YOU HAVE ANY OF THE FOLLOWING:      Difficulty breathing              Chills and/or fever over 101 F   Persistent vomiting and/or vomiting blood   Severe abdominal pain   Severe chest pain   Black, tarry stools   Bleeding- more than one tablespoon   Any other symptom or condition that you feel may need urgent attention  Your doctor recommends these additional instructions:  If any biopsies were taken, your doctors clinic will contact you in 1 to 2   weeks with any results.  - Discharge patient to home (ambulatory).   - Clear liquid diet today, then advance as tolerated to advance diet as   tolerated.   - No aspirin, ibuprofen, naproxen, or other non-steroidal anti-inflammatory   drugs for 2 weeks.   - Await path results.   - Use Protonix (pantoprazole) 40 mg PO BID.   - Use sucralfate suspension 1 gram PO BID for 2 weeks.   - Augmentin (amoxicillin/clavulanate) 875 mg PO BID for 5 days.   - Repeat the upper endoscopic ultrasound in 3 months for surveillance based   on pathology results.   - The findings and recommendations were discussed with the patient's   family.  For questions, problems or results please call your physician - Bright Gonzalez MD at Work:  [phone].  [facility], EMERGENCY ROOM PHONE NUMBER: [phone]  IF A COMPLICATION OR EMERGENCY SITUATION ARISES AND YOU ARE UNABLE TO REACH   YOUR PHYSICIAN - GO DIRECTLY TO THE EMERGENCY ROOM.  Bright Gonzalez MD  6/26/2025 8:27:54 AM  [employer]ATION

## 2025-06-27 ENCOUNTER — OFFICE VISIT (OUTPATIENT)
Dept: CARDIOLOGY | Facility: CLINIC | Age: 89
End: 2025-06-27
Payer: MEDICARE

## 2025-06-27 ENCOUNTER — TELEPHONE (OUTPATIENT)
Dept: UROLOGY | Facility: CLINIC | Age: 89
End: 2025-06-27
Payer: MEDICARE

## 2025-06-27 ENCOUNTER — TELEPHONE (OUTPATIENT)
Dept: GASTROENTEROLOGY | Facility: CLINIC | Age: 89
End: 2025-06-27
Payer: MEDICARE

## 2025-06-27 ENCOUNTER — NURSE TRIAGE (OUTPATIENT)
Dept: ADMINISTRATIVE | Facility: CLINIC | Age: 89
End: 2025-06-27
Payer: MEDICARE

## 2025-06-27 ENCOUNTER — HOSPITAL ENCOUNTER (EMERGENCY)
Facility: HOSPITAL | Age: 89
Discharge: HOME | End: 2025-06-27
Attending: EMERGENCY MEDICINE
Payer: MEDICARE

## 2025-06-27 VITALS
SYSTOLIC BLOOD PRESSURE: 139 MMHG | HEART RATE: 93 BPM | OXYGEN SATURATION: 98 % | TEMPERATURE: 98 F | HEIGHT: 70 IN | DIASTOLIC BLOOD PRESSURE: 73 MMHG | BODY MASS INDEX: 23.91 KG/M2 | RESPIRATION RATE: 18 BRPM | WEIGHT: 167 LBS

## 2025-06-27 VITALS
BODY MASS INDEX: 24.2 KG/M2 | SYSTOLIC BLOOD PRESSURE: 137 MMHG | OXYGEN SATURATION: 98 % | HEART RATE: 95 BPM | WEIGHT: 169.06 LBS | DIASTOLIC BLOOD PRESSURE: 77 MMHG | HEIGHT: 70 IN

## 2025-06-27 DIAGNOSIS — R82.81 PYURIA: ICD-10-CM

## 2025-06-27 DIAGNOSIS — I50.20 HEART FAILURE WITH REDUCED EJECTION FRACTION: ICD-10-CM

## 2025-06-27 DIAGNOSIS — E78.2 MIXED HYPERLIPIDEMIA: ICD-10-CM

## 2025-06-27 DIAGNOSIS — I25.810 CORONARY ARTERY DISEASE INVOLVING CORONARY BYPASS GRAFT OF NATIVE HEART WITHOUT ANGINA PECTORIS: Primary | ICD-10-CM

## 2025-06-27 DIAGNOSIS — T82.897D AORTIC PROSTHETIC VALVE REGURGITATION, SUBSEQUENT ENCOUNTER: ICD-10-CM

## 2025-06-27 DIAGNOSIS — Z95.2 S/P AORTIC VALVE REPLACEMENT: ICD-10-CM

## 2025-06-27 DIAGNOSIS — R31.9 HEMATURIA, UNSPECIFIED TYPE: Primary | ICD-10-CM

## 2025-06-27 DIAGNOSIS — I10 ESSENTIAL HYPERTENSION: ICD-10-CM

## 2025-06-27 DIAGNOSIS — R11.0 NAUSEA: Primary | ICD-10-CM

## 2025-06-27 LAB
ABSOLUTE EOSINOPHIL (OHS): 0.04 K/UL
ABSOLUTE MONOCYTE (OHS): 0.61 K/UL (ref 0.3–1)
ABSOLUTE NEUTROPHIL COUNT (OHS): 7.44 K/UL (ref 1.8–7.7)
ALBUMIN SERPL BCP-MCNC: 3.2 G/DL (ref 3.5–5.2)
ALP SERPL-CCNC: 227 UNIT/L (ref 40–150)
ALT SERPL W/O P-5'-P-CCNC: 40 UNIT/L (ref 10–44)
ANION GAP (OHS): 7 MMOL/L (ref 8–16)
AST SERPL-CCNC: 24 UNIT/L (ref 11–45)
BACTERIA #/AREA URNS AUTO: ABNORMAL /HPF
BASOPHILS # BLD AUTO: 0.05 K/UL
BASOPHILS NFR BLD AUTO: 0.5 %
BILIRUB SERPL-MCNC: 1.4 MG/DL (ref 0.1–1)
BILIRUB UR QL STRIP.AUTO: NEGATIVE
BUN SERPL-MCNC: 30 MG/DL (ref 8–23)
CALCIUM SERPL-MCNC: 8.9 MG/DL (ref 8.7–10.5)
CHLORIDE SERPL-SCNC: 105 MMOL/L (ref 95–110)
CLARITY UR: CLEAR
CO2 SERPL-SCNC: 20 MMOL/L (ref 23–29)
COLOR UR AUTO: ABNORMAL
CREAT SERPL-MCNC: 1.1 MG/DL (ref 0.5–1.4)
ERYTHROCYTE [DISTWIDTH] IN BLOOD BY AUTOMATED COUNT: 14.6 % (ref 11.5–14.5)
GFR SERPLBLD CREATININE-BSD FMLA CKD-EPI: >60 ML/MIN/1.73/M2
GLUCOSE SERPL-MCNC: 119 MG/DL (ref 70–110)
GLUCOSE UR QL STRIP: NEGATIVE
HCT VFR BLD AUTO: 42.7 % (ref 40–54)
HGB BLD-MCNC: 13.9 GM/DL (ref 14–18)
HGB UR QL STRIP: ABNORMAL
HYALINE CASTS UR QL AUTO: 0 /LPF (ref 0–1)
IMM GRANULOCYTES # BLD AUTO: 0.05 K/UL (ref 0–0.04)
IMM GRANULOCYTES NFR BLD AUTO: 0.5 % (ref 0–0.5)
KETONES UR QL STRIP: NEGATIVE
LEUKOCYTE ESTERASE UR QL STRIP: ABNORMAL
LYMPHOCYTES # BLD AUTO: 1.05 K/UL (ref 1–4.8)
MCH RBC QN AUTO: 30 PG (ref 27–31)
MCHC RBC AUTO-ENTMCNC: 32.6 G/DL (ref 32–36)
MCV RBC AUTO: 92 FL (ref 82–98)
MICROSCOPIC COMMENT: ABNORMAL
NITRITE UR QL STRIP: NEGATIVE
NUCLEATED RBC (/100WBC) (OHS): 0 /100 WBC
PH UR STRIP: 6 [PH]
PLATELET # BLD AUTO: 150 K/UL (ref 150–450)
PMV BLD AUTO: 11.1 FL (ref 9.2–12.9)
POTASSIUM SERPL-SCNC: 4.5 MMOL/L (ref 3.5–5.1)
PROT SERPL-MCNC: 6.8 GM/DL (ref 6–8.4)
PROT UR QL STRIP: ABNORMAL
RBC # BLD AUTO: 4.64 M/UL (ref 4.6–6.2)
RBC #/AREA URNS AUTO: >100 /HPF (ref 0–4)
RELATIVE EOSINOPHIL (OHS): 0.4 %
RELATIVE LYMPHOCYTE (OHS): 11.4 % (ref 18–48)
RELATIVE MONOCYTE (OHS): 6.6 % (ref 4–15)
RELATIVE NEUTROPHIL (OHS): 80.6 % (ref 38–73)
SODIUM SERPL-SCNC: 132 MMOL/L (ref 136–145)
SP GR UR STRIP: 1.01
SQUAMOUS #/AREA URNS AUTO: 0 /HPF
UROBILINOGEN UR STRIP-ACNC: NEGATIVE EU/DL
WBC # BLD AUTO: 9.24 K/UL (ref 3.9–12.7)
WBC #/AREA URNS AUTO: 9 /HPF (ref 0–5)
YEAST UR QL AUTO: ABNORMAL /HPF

## 2025-06-27 PROCEDURE — 80053 COMPREHEN METABOLIC PANEL: CPT | Performed by: EMERGENCY MEDICINE

## 2025-06-27 PROCEDURE — 81001 URINALYSIS AUTO W/SCOPE: CPT | Performed by: PHYSICIAN ASSISTANT

## 2025-06-27 PROCEDURE — 51702 INSERT TEMP BLADDER CATH: CPT

## 2025-06-27 PROCEDURE — 85025 COMPLETE CBC W/AUTO DIFF WBC: CPT | Performed by: EMERGENCY MEDICINE

## 2025-06-27 PROCEDURE — 99284 EMERGENCY DEPT VISIT MOD MDM: CPT | Mod: 25

## 2025-06-27 PROCEDURE — 99999 PR PBB SHADOW E&M-EST. PATIENT-LVL IV: CPT | Mod: PBBFAC,,, | Performed by: INTERNAL MEDICINE

## 2025-06-27 PROCEDURE — 51798 US URINE CAPACITY MEASURE: CPT

## 2025-06-27 RX ORDER — SULFAMETHOXAZOLE AND TRIMETHOPRIM 800; 160 MG/1; MG/1
1 TABLET ORAL 2 TIMES DAILY
Qty: 14 TABLET | Refills: 0 | Status: SHIPPED | OUTPATIENT
Start: 2025-06-27 | End: 2025-07-04

## 2025-06-27 RX ORDER — SULFAMETHOXAZOLE AND TRIMETHOPRIM 800; 160 MG/1; MG/1
1 TABLET ORAL 2 TIMES DAILY
Qty: 14 TABLET | Refills: 0 | Status: SHIPPED | OUTPATIENT
Start: 2025-06-27 | End: 2025-06-27

## 2025-06-27 RX ORDER — CEFTRIAXONE 2 G/1
2 INJECTION, POWDER, FOR SOLUTION INTRAMUSCULAR; INTRAVENOUS
Status: DISCONTINUED | OUTPATIENT
Start: 2025-06-27 | End: 2025-06-27

## 2025-06-27 RX ORDER — ONDANSETRON 8 MG/1
8 TABLET, FILM COATED ORAL EVERY 12 HOURS PRN
Qty: 10 TABLET | Refills: 0 | Status: SHIPPED | OUTPATIENT
Start: 2025-06-27

## 2025-06-27 RX ORDER — CEPHALEXIN 500 MG/1
500 CAPSULE ORAL 4 TIMES DAILY
Qty: 20 CAPSULE | Refills: 0 | Status: SHIPPED | OUTPATIENT
Start: 2025-06-27 | End: 2025-06-27 | Stop reason: ALTCHOICE

## 2025-06-27 RX ORDER — CEPHALEXIN 500 MG/1
500 CAPSULE ORAL 4 TIMES DAILY
Qty: 20 CAPSULE | Refills: 0 | Status: SHIPPED | OUTPATIENT
Start: 2025-06-27 | End: 2025-06-27

## 2025-06-27 NOTE — ED PROVIDER NOTES
Encounter Date: 6/27/2025       History     Chief Complaint   Patient presents with    Hematuria     Blood in urine, has brown cath that placed 2 d ago, not on blood thinners     89-year-old male with past medical history HTN, hyperlipidemia, CAD status post CABG x 2, mechanical aortic valve on daily low-dose aspirin, BPH is presenting to the ED with gross hematuria since Brown was placed under 48 hours ago.  The patient presents to the ED yesterday after he began having urinary retention after esophageal surgery.  Brown was placed during ED course and he was discharged home after UA did not have evidence of infection.  He was discharged after the esophageal surgery on Augmentin with which she reports compliance.  He does report additional constipation but is passing flatus and denies vomiting or fever.  Of note, he used to follow with a urologist but has not seen urology in years.    The history is provided by the patient. No  was used.     Review of patient's allergies indicates:  No Known Allergies  Past Medical History:   Diagnosis Date    Coronary artery disease     Heart murmur     Hyperlipidemia     Hypertension     Joint pain ~2 months    Trigger finger    Mechanical heart valve present     Aortic valve    Skin disease     Axillary and left thigh pruritic rash    Valvular regurgitation      Past Surgical History:   Procedure Laterality Date    APPENDECTOMY  1950    CARDIAC VALVE REPLACEMENT  2007    Bioprosthetic Aortic  valve    CARDIAC VALVE SURGERY  2007    CATARACT EXTRACTION      CATARACT EXTRACTION W/  INTRAOCULAR LENS IMPLANT Right 03/06/2023    Procedure: EXTRACTION, CATARACT, WITH IOL INSERTION;  Surgeon: Brian Davis MD;  Location: [facility] OR;  Service: Ophthalmology;  Laterality: Right;    CATARACT EXTRACTION W/  INTRAOCULAR LENS IMPLANT Left 03/20/2023    Procedure: EXTRACTION, CATARACT, WITH IOL INSERTION;  Surgeon: Brian Davis MD;  Location: [facility] OR;  Service:  Ophthalmology;  Laterality: Left;    CORONARY ARTERY BYPASS GRAFT  2007    x 2    DISSECTION, LESION, ESOPHAGUS, STOMACH, OR DUODENUM, SUBMUCOSAL, ENDOSCOPIC N/A 6/25/2025    Procedure: DISSECTION, LESION, ESOPHAGUS, STOMACH, OR DUODENUM, SUBMUCOSAL, ENDOSCOPIC;  Surgeon: Bright Gonzalez MD;  [facility]  Service: Endoscopy;  Laterality: N/A;  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 Cardiac clearance approved with Dr. Tobin. Pt is not currently taking jardiance SL  6/23- precall complete.     ENDOSCOPIC ULTRASOUND OF UPPER GASTROINTESTINAL TRACT N/A 6/25/2025    Procedure: ULTRASOUND, UPPER GI TRACT, ENDOSCOPIC;  Surgeon: Bright Gonzalez MD;  Location: [facility] ([address] [place] [address]);  Service: Endoscopy;  Laterality: N/A;     Family History   Problem Relation Name Age of Onset    Cancer Mother Anu         Colon    Depression Brother Enrrique         Years ago    Heart attack Neg Hx      Heart disease Neg Hx      Hyperlipidemia Neg Hx      Hypertension Neg Hx      Melanoma Neg Hx       Social History[1]  Review of Systems   Constitutional:  Negative for diaphoresis, fatigue and fever.   Respiratory:  Negative for cough and shortness of breath.    Cardiovascular:  Negative for chest pain.   Gastrointestinal:  Positive for constipation (passing flatus). Negative for abdominal pain, nausea and vomiting.   Genitourinary:  Positive for hematuria. Negative for dysuria, penile pain and penile swelling.   Musculoskeletal:  Negative for back pain.   All other systems reviewed and are negative.      Physical Exam     Initial Vitals [06/27/25 1421]   BP Pulse Resp Temp SpO2   128/68 95 20 98.1 °F (36.7 °C) 96 %      MAP       --         Physical Exam    Vitals reviewed.  Constitutional: Vital signs are normal. He appears well-developed. He is not diaphoretic. He is active.  Non-toxic appearance. He does not have a sickly appearance. He does not appear  ill. No distress.   HENT:   Head: Normocephalic and atraumatic.   Eyes: Conjunctivae, EOM and lids are normal.   Neck: Neck supple.   Normal range of motion.  Cardiovascular:  Normal rate, regular rhythm and normal heart sounds.           Abdominal: Abdomen is soft and flat. There is no abdominal tenderness.   No right CVA tenderness.  No left CVA tenderness. There is no rebound and no guarding.   Genitourinary: Circumcised.    Genitourinary Comments: Chaperoned by RN. Pendleton catheter in place. No penile abnormalities. Leg bag with blood-tinged urine.      Musculoskeletal:      Cervical back: Normal range of motion and neck supple.     Neurological: He is alert. No cranial nerve deficit. He exhibits normal muscle tone. Gait normal.   Skin: Skin is warm and dry.         ED Course   Procedures  Labs Reviewed   COMPREHENSIVE METABOLIC PANEL - Abnormal       Result Value    Sodium 132 (*)     Potassium 4.5      Chloride 105      CO2 20 (*)     Glucose 119 (*)     BUN 30 (*)     Creatinine 1.1      Calcium 8.9      Protein Total 6.8      Albumin 3.2 (*)     Bilirubin Total 1.4 (*)      (*)     AST 24      ALT 40      Anion Gap 7 (*)     eGFR >60     CBC WITH DIFFERENTIAL - Abnormal    WBC 9.24      RBC 4.64      HGB 13.9 (*)     HCT 42.7      MCV 92      MCH 30.0      MCHC 32.6      RDW 14.6 (*)     Platelet Count 150      MPV 11.1      Nucleated RBC 0      Neut % 80.6 (*)     Lymph % 11.4 (*)     Mono % 6.6      Eos % 0.4      Basophil % 0.5      Imm Grans % 0.5      Neut # 7.44      Lymph # 1.05      Mono # 0.61      Eos # 0.04      Baso # 0.05      Imm Grans # 0.05 (*)    URINALYSIS, REFLEX TO URINE CULTURE - Abnormal    Color, UA Brown (*)     Appearance, UA Clear      pH, UA 6.0      Spec Grav UA 1.010      Protein, UA 1+ (*)     Glucose, UA Negative      Ketones, UA Negative      Bilirubin, UA Negative      Blood, UA 3+ (*)     Nitrites, UA Negative      Urobilinogen, UA Negative      Leukocyte Esterase, UA  1+ (*)    URINALYSIS MICROSCOPIC - Abnormal    RBC, UA >100 (*)     WBC, UA 9 (*)     Bacteria, UA Rare      Yeast, UA None      Squamous Epithelial Cells, UA 0      Hyaline Casts, UA 0      Microscopic Comment       CBC W/ AUTO DIFFERENTIAL    Narrative:     The following orders were created for panel order CBC auto differential.  Procedure                               Abnormality         Status                     ---------                               -----------         ------                     CBC with Differential[[id number]]       Abnormal            Final result                 Please view results for these tests on the individual orders.   GREY TOP URINE HOLD          Imaging Results    None          Medications - No data to display  Medical Decision Making  89 y.o. male with a past medical history of HTN, hyperlipidemia, CAD status post CABG x 2, BPH presented to the ED with hematuria in the setting of recently placed Pendleton under 48 hours ago.  Differentials include, but are not limited to catheter associated UTI, urethral trauma.  Discussed attempting voiding trial; he was agreeable to having Pendleton replaced if he fails voiding trial.  The patient and his wife opted against IV antibiotics (see ED course and remainder of MDM narrative below for further details).  Pendleton was replaced and he was discharged with a wait and see prescription for Bactrim at request of the patient's wife.      Comorbidities that are addressed or increase patient risk:  BPH, recently placed Pendleton catheter     Diagnostic tests or therapies considered but not ordered:  Considered IV antibiotics, however the patient's wife was concerned for antibiotic resistance as the patient has received so many antibiotics over the past 72 hours since his esophageal procedure. She requested to speak to an attending; after on shift attending spoke to the patient's wife, the patient's wife reported she would prefer to have a prescription for  antibiotics and have the patient start taking them if he develops systemic symptoms.      Shared decision making:  See diagnostic tests or therapies above and ED course for reason antibiotics were deferred per wife of patient's request.      Problems Addressed:  Hematuria, unspecified type: acute illness or injury  Pyuria: acute illness or injury    Amount and/or Complexity of Data Reviewed  Independent Historian: spouse  Labs: ordered. Decision-making details documented in ED Course.    Risk  Prescription drug management.               ED Course as of 06/27/25 1935 Fri Jun 27, 2025   1613 WBC: 9.24 [SE]   1620 Pendleton placed unde 48 hours ago. Will remove and attempt to have patient void and check PVR [SE]   1706 Hemoglobin(!): 13.9 [SE]   1728 RBC, UA(!): >100 [SE]   1728 WBC, UA(!): 9 [SE]   1728 Blood, UA(!): 3+ [SE]   1728 Leukocyte Esterase, UA(!): 1+ [SE]   1801 Reassessment: I discussed results with the patient and his wife. The patient's wife expressed concern that we were treating him with more antibiotics. She is hesitant for the patient to receive additional antibiotics as specimen is nitrite negative. She requested to speaking to an attending before we proceed with antibiotics.  [SE]   1845 Discussed case with attending. The patient's wife is requesting a prescription that she can fill if he begins to have systemic signs.  [SE]   1857 Failed voiding trial.  [SE]   1928 Discussed results and plan to place Pendleton. Prescribed Bactrim per wife's request to have wait and see antibiotic prescription. Strict return precautions discussed. All questions answered. The patient and wife verbalized understanding and agreed to plan.  [SE]      ED Course User Index  [SE] Virginia Almanzar PA-C                           Clinical Impression:  Final diagnoses:  [R31.9] Hematuria, unspecified type (Primary)  [R82.81] Pyuria          ED Disposition Condition    Discharge Stable          ED Prescriptions       Medication  Sig Dispense Start Date End Date Auth. Provider    cephALEXin (KEFLEX) 500 MG capsule  (Status: Discontinued) Take 1 capsule (500 mg total) by mouth 4 (four) times daily. for 5 days 20 capsule 2025 Virginia Almanzar PA-C    cephALEXin (KEFLEX) 500 MG capsule  (Status: Discontinued) Take 1 capsule (500 mg total) by mouth 4 (four) times daily. for 5 days 20 capsule 2025 Virginia Almanzar PA-C    sulfamethoxazole-trimethoprim 800-160mg (BACTRIM DS) 800-160 mg Tab  (Status: Discontinued) Take 1 tablet by mouth 2 (two) times daily. for 7 days 14 tablet 2025 Virginia Almanzar PA-C    sulfamethoxazole-trimethoprim 800-160mg (BACTRIM DS) 800-160 mg Tab Take 1 tablet by mouth 2 (two) times daily. for 7 days 14 tablet 2025 Virginia Almanzar PA-C          Follow-up Information       Follow up With Specialties Details Why Contact Info Additional Information    [facility] - Urology [facility] Urology Call in 3 days To schedule an appointment for follow up. [address] [facility] [id number] [phone] Please park in the [facility] surface lot on the [address] [facility] side. Check in on the 2nd floor.    [facility] - Emergency Dept Emergency Medicine Go to  As needed, If symptoms worsen, For any emergent concerns or problems [address] [facility] [id number] [phone]                    [1]   Social History  Tobacco Use    Smoking status: Former     Current packs/day: 0.00     Types: Cigarettes     Quit date: 1976     Years since quittin.5    Smokeless tobacco: Never   Vaping Use    Vaping status: Never Used   Substance Use Topics    Alcohol use: Yes     Alcohol/week: 14.0 standard drinks of alcohol     Types: 7 Glasses of wine, 7 Cans of beer per week     Comment: Occasionally    Drug use: Never        Virginia Almanzar PA-C  25

## 2025-06-27 NOTE — ED TRIAGE NOTES
Pt arrives to ED with complaints of Hematuria. He had an endoscopic US dissection of an esophageal tumor  that was done Wednesday. Pt was unable to urinate after the procedure and came back to have a brown placed and noticed blood in his leg bag as of yesterday. Pt denies Fever and chills , and pain . Endorses nausea this morning but not at this time.

## 2025-06-27 NOTE — TELEPHONE ENCOUNTER
Pt and spouse showed up in the lobby at 2:05 pm wanting to be seen. Spouse states he went to the ER last Wednesday after a GI procedure due to urinary retention. A brown was placed ( unknown amount drained) he now has hematuria with clots in the brown bag and wants to be seen ASAP. ( Pt is scheduled next Wednesday with ms maki for a voiding trial). They stated that he was a  pt of dr rosen but can't  see him because he is now retired. Please note that dr rosen did just recently retire;  however the pt has not seen dr rosen since 11/29/2022.  Wife states she is a physician and wants him seen now. I explained that unfortunately it is 2pm on a Friday and we do not have any urgent care openings at this time and that he more than likely needs to be irrigated, so he should go to the ER. Wife states he just had surgery and we should see him [facility] being sure that he could urinate. I again apologized, but explained that he did not have a urological procedure, so we ( urology ) were unaware of this most recent event. She was upset and said that she is getting the run around. She then abrubtly left the lobby

## 2025-06-27 NOTE — TELEPHONE ENCOUNTER
Received a call from patients wife stating that patient is nauseous, unable to have a BM since 6/25, has a nose bleed, and has blood in his brown catheter bag that was placed in the ER after having urinary retention post procedure. I advised for them to go to the ER but she stated her dislike of the ER. She also stated that patient has a cardiology appt today. I notified Dr. Davis who also advised going to the ER. She stated that his urologist retired and would try to contact a urology nurse and go to his cardiology appt.

## 2025-06-27 NOTE — PROGRESS NOTES
Subjective:   Patient ID:  Chang Purcell is a 89 y.o. male who presents for follow up of [facility]      HPI: 3 month f/u.  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  This was complicated by urinary retention requiring P[place]leton catheter placement.    Today, he notes that his weight is up 15# and his abdomen feels tighter than normal.  No chest pain, palpitations, new dyspnea, or PND/orthopnea.      4/8/2025 HPI: Somewhat urgent follow up for increasing fatigue and after an echo yesterday showed what appeared to be elevated right and left heart pressures.  He's had fatigue but also says he doesn't really know what shortness of breath is.  He can lie flat without difficulty, has actually lost weight, and has no leg edema of significance.        Echo 4/7/2025       Left Ventricle: The left ventricle is mildly dilated. Normal wall thickness. Global hypokinesis present. Septal motion is consistent with bundle branch block. There is severely reduced systolic function with a visually estimated ejection fraction of 25 - 30%. Quantitated ejection fraction is 30%. There is diastolic dysfunction. Elevated left ventricular filling pressure.    Right Ventricle: The right ventricle has moderate enlargement. Systolic function is moderately reduced.    Left Atrium: Severely dilated    Right Atrium: Right atrium is dilated.    Aortic Valve: There is a bioprosthetic valve in the aortic position. It is reported to be a 23 mm Medtronic valve. Aortic valve area by VTI is 2.1 cm2. Aortic valve peak velocity is 2.7 m/s. Mean gradient is 17 mmHg. The dimensionless index is 0.32. There is mild to moderate aortic regurgitation with a centrally directed jet.    Mitral Valve: There is moderate regurgitation.    Tricuspid Valve: There is moderate annular dilation present. There is severe regurgitation.    Pulmonary Artery: The estimated pulmonary artery systolic  pressure is 67 mmHg.    IVC/SVC: Elevated venous pressure at 15 mmHg.        8/20/2024 HPI: 3.5 month f/u as a matter of routine after having some difficulties with volume overload in the spring of the year.  No change in weight or symptoms.     He denies chest discomfort, LAUREN, palpitations, PND/orthopnea, lightheadedness and syncope.           5/3/2024 HPI: Back for four week f/u after having been seen urgently at the start of April with acute volume overload.  By the time I saw him, he had already quickly diuresed 5-7 lbs.  He was 170 lb in clinic that day.     Today he feels well and denies chest discomfort, LAUREN, palpitations, PND/orthopnea, lightheadedness and syncope.     He rarely has to take a second furosemide, but since stopping eating [employer] fried chicken, he hasn't.        4/5/2024 HPI: Here urgently after noticing more dyspnea with weight gain and leg swelling and wheezing for the last month.  Yesterday, I got him an echo, noted a plethoric IVC, and prescribed lasix 40 bid.  He had been naive to that medicine.     Since, he's lost 5-7 lbs with copious urine output.             June 2021 HPI: 28 month f/u of CAD s/p CABG, bioprosthetic AVR, and central prosthetic regurgitation.  He is doing well with no new symptoms or cardiovascular complaints and no change in exercise capacity.  He denies chest discomfort, LAUREN, palpitations, PND/orthopnea, lightheadedness and syncope.     His wife joins him again today and notes that she used to be a  in [place].  She's not sad that she's now mostly retired, though she does some consulting work.     BPs at home have been variable 120s-130s systolic occasionally, but often 140-160.  He was recently started on amlodipine when he really didn't want to double his HCTZ after a slightly decreased Na+ concentration was noted on an outside BMP.  These results are not available.        Feb 2019 HPI: Routine 6 month f/u of CABG, AVR, and central leak.  He is  doing well with no new symptoms or cardiovascular complaints and no change in exercise capacity.  He denies chest discomfort, LAUREN, palpitations, PND/orthopnea, lightheadedness and syncope.     He has occasional calf tightness - at least in the past - but it hasn't been happening as much regularly.  His wife, a retired family medicine doctor, is worried that he's dialing back his activity.        2018 HPI: Very pleasant man previously seen by Dr. Lewis with Dr. Jolene Lantigua in 2016 who presents with episodes of what he describes as dizziness (not room-spinning but with a feeling of unsteadiness and lightheadness).  He has had two      He had a CABG and AVR (bioprosthesis) in  and on his 2016 echo there was evidence of mild to moderate AI.  It was not stated whether the insufficiency was central or paravalvular        2018 Echo:  CONCLUSIONS     1 - Low normal left ventricular systolic function (EF 50-55%).     2 - No wall motion abnormalities.     3 - Biatrial enlargement.     4 - Indeterminate LV diastolic function.     5 - Right ventricular enlargement with low normal to mildly depressed systolic function.     6 - The estimated PA systolic pressure is 25 mmHg.     7 - Aortic valve prosthesis, LIZETTE = 1.07 cm2, AVAi = 0.54 cm2/m2, peak velocity = 3.18 m/s, mean gradient = 24 mmHg.     8 - Mild to moderate valvular aortic regurgitation.     9 - Trivial mitral regurgitation.     10 - Trivial tricuspid regurgitation.     11 - Consider degenerating aortic bioiprosthesis.  Clinical correlation.        Problem List[1]    Current Outpatient Medications   Medication Sig    amoxicillin (AMOXIL) 500 MG Tab SMARTSI Tablet(s) By Mouth    amoxicillin-clavulanate 875-125mg (AUGMENTIN) 875-125 mg per tablet Take 1 tablet by mouth every 12 (twelve) hours. for 5 days    ascorbic acid, vitamin C, (VITAMIN C) 1000 MG tablet Take 1,000 mg by mouth 2 (two) times a day.    aspirin 81 mg Cap Take 81 mg/kg/day by mouth.     atorvastatin (LIPITOR) 40 MG tablet Take 40 mg by mouth.    azelastine (ASTELIN) 137 mcg (0.1 %) nasal spray SMARTSI Spray(s) Both Nares Twice Daily PRN    co-enzyme Q-10 30 mg capsule Take 3 capsules (90 mg total) by mouth 2 (two) times daily.    empagliflozin (JARDIANCE) 10 mg tablet Take 1 tablet (10 mg total) by mouth once daily.    finasteride (PROSCAR) 5 mg tablet     fluticasone propionate (FLONASE) 50 mcg/actuation nasal spray 1 spray by Nasal route.    furosemide (LASIX) 40 MG tablet Take 1 tablet (40 mg total) by mouth 2 (two) times daily.    hydrocodone-acetaminophen (HYCET) solution 7.5-325 mg/15mL Take 15 mLs by mouth every 6 (six) hours as needed for Pain.    ID NOW COVID-19 TEST KIT Kit     Lactobacillus acidophilus (PROBIOTIC) 10 billion cell Cap Take 100 tablets by mouth 2 (two) times a day.    loratadine (CLARITIN) 10 mg tablet Take 10 mg by mouth once daily.    losartan (COZAAR) 50 MG tablet Take 50 mg by mouth once daily.    megestroL (MEGACE) 20 MG Tab 1 tablet Orally Twice a day for 30 days    metoprolol succinate (TOPROL-XL) 25 MG 24 hr tablet Take 1 tablet (25 mg total) by mouth once daily.    omeprazole (PRILOSEC) 40 MG capsule Take 40 mg by mouth.    ondansetron (ZOFRAN) 8 MG tablet Take 1 tablet (8 mg total) by mouth every 12 (twelve) hours as needed for Nausea.    pantoprazole (PROTONIX) 40 MG tablet Take 1 tablet (40 mg total) by mouth 2 (two) times daily.    sucralfate (CARAFATE) 100 mg/mL suspension Take 10mLs (1 gram total) by mouth twice daily for 7 days.    tamsulosin (FLOMAX) 0.4 mg Cap 1 capsule.     No current facility-administered medications for this visit.       ROS  The review of systems is negative except as above.     Objective:   Physical Exam  Vitals reviewed.   Constitutional:       Appearance: He is well-developed.   HENT:      Head: Normocephalic and atraumatic.   Eyes:      General: No scleral icterus.     Conjunctiva/sclera: Conjunctivae normal.   Neck:       Vascular: No JVD.   Cardiovascular:      Rate and Rhythm: Normal rate and regular rhythm.      Pulses: Intact distal pulses.      Heart sounds: Normal heart sounds. No murmur heard.     No friction rub. No gallop.   Pulmonary:      Effort: Pulmonary effort is normal.      Breath sounds: Normal breath sounds. No wheezing or rales.   Abdominal:      General: Bowel sounds are normal. There is no distension.      Palpations: Abdomen is soft.      Tenderness: There is no abdominal tenderness.   Musculoskeletal:         General: Normal range of motion.      Cervical back: Normal range of motion and neck supple.   Skin:     General: Skin is warm and dry.      Findings: No erythema or rash.   Neurological:      Mental Status: He is alert and oriented to person, place, and time.   Psychiatric:         Behavior: Behavior normal.         Thought Content: Thought content normal.         Judgment: Judgment normal.     Lab Results   Component Value Date    WBC 5.69 05/06/2025    HGB 13.8 (L) 05/06/2025    HCT 36 06/26/2025    MCV 89 05/06/2025     05/06/2025         Chemistry        Component Value Date/Time     05/06/2025 1642     (L) 04/08/2024 0900    K 4.8 05/06/2025 1642    K 4.5 04/08/2024 0900     05/06/2025 1642    CL 96 04/08/2024 0900    CO2 22 (L) 05/06/2025 1642    CO2 29 04/08/2024 0900    BUN 45 (H) 05/06/2025 1642    CREATININE 1.4 05/06/2025 1642    GLU 90 05/06/2025 1642    GLU 92 04/08/2024 0900        Component Value Date/Time    CALCIUM 9.0 05/06/2025 1642    CALCIUM 10.4 04/08/2024 0900    ALKPHOS 181 (H) 05/06/2025 1642    ALKPHOS 124 04/08/2024 0900    AST 26 05/06/2025 1642    AST 27 04/08/2024 0900    ALT 29 05/06/2025 1642    ALT 24 04/08/2024 0900    BILITOT 0.8 05/06/2025 1642    BILITOT 1.1 (H) 04/08/2024 0900    ESTGFRAFRICA >60.0 06/01/2021 1237    EGFRNONAA >60.0 06/01/2021 1237            Lab Results   Component Value Date    CHOL 157 04/08/2024    CHOL 223 (H) 06/15/2016  "    Lab Results   Component Value Date    HDL 60 04/08/2024    HDL 61 06/15/2016     Lab Results   Component Value Date    LDLCALC 85.8 04/08/2024    LDLCALC 134.8 06/15/2016     Lab Results   Component Value Date    TRIG 56 04/08/2024    TRIG 136 06/15/2016     Lab Results   Component Value Date    CHOLHDL 38.2 04/08/2024    CHOLHDL 27.4 06/15/2016       Lab Results   Component Value Date    TSH 1.405 10/22/2018       No results found for: "HGBA1C"    Assessment:     1. 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 Heart failure with reduced ejection fraction    3. Essential hypertension    4. Aortic prosthetic valve regurgitation, subsequent encounter    5. S/P aortic valve replacement (bioprosthetic)    6. Mixed hyperlipidemia        Plan:     Continue current medicines.  Increase lasix to 40 bid.  If diuresis doesn't get kickstarted, quickly switch to 80 bid.    We can consider re-starting Jardiance at the next visit.    Diet/exercise goals reinforced.    F/U 2 months with me on an echo day.                 [1]   Patient Active Problem List  Diagnosis    History of benign prostatic hypertrophy    Male erectile disorder    Hematuria    Valvular heart disease    Coronary artery disease involving coronary bypass graft    Decreased hearing    S/P aortic valve replacement (bioprosthetic)    Aortic prosthetic valve regurgitation    Unspecified symptoms and signs involving cognitive functions and awareness    Essential hypertension    MCI (mild cognitive impairment)    Fatigue    Hypertensive urgency    Mixed hyperlipidemia    Nuclear sclerotic cataract of left eye    Heart failure with reduced ejection fraction    Acute combined systolic and diastolic heart failure    Esophageal adenocarcinoma     "

## 2025-06-27 NOTE — TELEPHONE ENCOUNTER
Reason for Disposition   Triager unable to complete call (e.g., caller continues to be abusive or caller hangs up)    Protocols used: Difficult Call-A-OH  Pts wife calling with pt having hematuria and had catheter placed after procedure and wasn't made sure that he urinated before being sent home and now had blood in the catheter. She said that she is pissed as hell as she was put to urology and they sent her to nurse on call. Pt not triaged wife hung up. I will close the chart as difficult caller. Pt doesn't have a urologist and tried to explain that the issue and she hung up

## 2025-06-28 LAB — HOLD SPECIMEN: NORMAL

## 2025-07-02 ENCOUNTER — OFFICE VISIT (OUTPATIENT)
Dept: UROLOGY | Facility: CLINIC | Age: 89
End: 2025-07-02
Payer: MEDICARE

## 2025-07-02 ENCOUNTER — PATIENT MESSAGE (OUTPATIENT)
Dept: GASTROENTEROLOGY | Facility: CLINIC | Age: 89
End: 2025-07-02
Payer: MEDICARE

## 2025-07-02 ENCOUNTER — TELEPHONE (OUTPATIENT)
Dept: UROLOGY | Facility: CLINIC | Age: 89
End: 2025-07-02

## 2025-07-02 VITALS
DIASTOLIC BLOOD PRESSURE: 64 MMHG | BODY MASS INDEX: 22.62 KG/M2 | HEIGHT: 70 IN | SYSTOLIC BLOOD PRESSURE: 132 MMHG | WEIGHT: 158 LBS | HEART RATE: 72 BPM

## 2025-07-02 DIAGNOSIS — N13.8 BPH WITH URINARY OBSTRUCTION: Primary | ICD-10-CM

## 2025-07-02 DIAGNOSIS — N40.1 BPH WITH URINARY OBSTRUCTION: Primary | ICD-10-CM

## 2025-07-02 DIAGNOSIS — R33.9 URINARY RETENTION: ICD-10-CM

## 2025-07-02 LAB — POC RESIDUAL URINE VOLUME: 277 ML (ref 0–100)

## 2025-07-02 PROCEDURE — 99999 PR PBB SHADOW E&M-EST. PATIENT-LVL IV: CPT | Mod: PBBFAC,,,

## 2025-07-02 NOTE — PROGRESS NOTES
CHIEF COMPLAINT:  Voiding trial       HISTORY OF PRESENTING ILLINESS:  Chang Purcell is a 89 y.o. male is here today for a voiding trial. He has a history of esophageal carcinoma, BPH, ED, hematuria, and decreased hearing. He has an esophageal resection on 6/25/2025 and reports later that day ended up going to the ER as he was unable to urinate for 10 hrs post procedure. A brown was then placed and he was sent home. On 6/27 he [facility] to [facility] unscheduled around 2:05 wanting to be seen for a VT. Pt and spouse were informed that it was too late in the day for a VT and that he needed to wait to be seen for his scheduled appt. Pt ended presented to the ER later that evening where brown was removed. He failed a second VT and was sent home with a catheter.   Today he is here with this spouse (retired MD) for brown removal. His spouse states he had no issues urinating on his own prior to his procedure. He does takes Flomax and Finasteride daily for BPH management. She reports they noticed some clots in his urine after the first brown was placed and also noted some blood at the meatus this past weekend. Think it was related to trauma of the brown catheter.           REVIEW OF SYSTEMS:  Review of Systems   Constitutional:  Negative for chills and fever.   Genitourinary:         See HPI   Musculoskeletal:  Positive for myalgias.   Neurological:  Negative for dizziness and headaches.         PATIENT HISTORY:    Past Medical History:   Diagnosis Date    Coronary artery disease     Heart murmur     Hyperlipidemia     Hypertension     Joint pain ~2 months    Trigger finger    Mechanical heart valve present     Aortic valve    Skin disease     Axillary and left thigh pruritic rash    Valvular regurgitation        Past Surgical History:   Procedure Laterality Date    APPENDECTOMY  1950    CARDIAC VALVE REPLACEMENT  2007    Bioprosthetic Aortic  valve    CARDIAC VALVE SURGERY  2007    CATARACT EXTRACTION       CATARACT EXTRACTION W/  INTRAOCULAR LENS IMPLANT Right 03/06/2023    Procedure: EXTRACTION, CATARACT, WITH IOL INSERTION;  Surgeon: Brian Davis MD;  Location: [facility] OR;  Service: Ophthalmology;  Laterality: Right;    CATARACT EXTRACTION W/  INTRAOCULAR LENS IMPLANT Left 03/20/2023    Procedure: EXTRACTION, CATARACT, WITH IOL INSERTION;  Surgeon: Brian Dvais MD;  Location: [facility] OR;  Service: Ophthalmology;  Laterality: Left;    CORONARY ARTERY BYPASS GRAFT  2007    x 2    DISSECTION, LESION, ESOPHAGUS, STOMACH, OR DUODENUM, SUBMUCOSAL, ENDOSCOPIC N/A 6/25/2025    Procedure: DISSECTION, LESION, ESOPHAGUS, STOMACH, OR DUODENUM, SUBMUCOSAL, ENDOSCOPIC;  Surgeon: Bright Gonzalez MD;  Location: [facility] ENDO ([address] [place] [address]);  Service: Endoscopy;  Laterality: N/A;  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 Cardiac clearance approved with Dr. Tobin. Pt is not currently taking jardiance SL  6/23- precall complete.     ENDOSCOPIC ULTRASOUND OF UPPER GASTROINTESTINAL TRACT N/A 6/25/2025    Procedure: ULTRASOUND, UPPER GI TRACT, ENDOSCOPIC;  Surgeon: Bright Gonzalez MD;  Location: [facility] ENDO ([address] [place] [address]);  Service: Endoscopy;  Laterality: N/A;       Family History   Problem Relation Name Age of Onset    Cancer Mother Anu         Colon    Depression Brother Enrrique         Years ago    Heart attack Neg Hx      Heart disease Neg Hx      Hyperlipidemia Neg Hx      Hypertension Neg Hx      Melanoma Neg Hx         Social History[1]    Allergies:  Patient has no known allergies.    Medications:  Current Medications[2]    PHYSICAL EXAMINATION:  Physical Exam  Vitals reviewed.   Constitutional:       General: He is awake.      Appearance: Normal appearance.   HENT:      Head: Normocephalic and atraumatic.   Pulmonary:      Effort: Pulmonary effort is normal. No respiratory distress.   Abdominal:      General: Abdomen is flat. There is no distension.   Genitourinary:     Penis:  Uncircumcised.       Testes: Normal.      Comments: Dried blood to meatus   Musculoskeletal:      Comments: Uses a cane    Skin:     General: Skin is warm and dry.      Capillary Refill: Capillary refill takes less than 2 seconds.   Neurological:      General: No focal deficit present.      Mental Status: He is alert.   Psychiatric:         Mood and Affect: Mood normal.         Behavior: Behavior normal. Behavior is cooperative.         Thought Content: Thought content normal.           LABS:    A bladder scan was done after catheter removal which showed       Lab Results   Component Value Date    PSA 2.0 06/15/2016       Lab Results   Component Value Date    CREATININE 1.1 2025    EGFRNORACEVR >60 2025               IMPRESSION:    Encounter Diagnoses   Name Primary?    Urinary retention          Assessment:       1. Urinary retention        Plan:       - Name, , allergies verified. Procedure explained, questions answered. Nurse Tim instilled 150 ml in the bladder until the patient verbalized discomfort. Brown catheter balloon was then deflated and catheter was removed without issue. Instructed to drink 6-8 oz water/hr until urology clinic calls the patient at 1300 to check on urination status. Patient verbalized understanding. If unable to urinate, pt will need to RTC by 1500 so that PVR can be obtained with possible brown catheter placement. Patient and spouse verbalized understanding.     I spent a total of 30 minutes on the day of the visit. This includes face to face time and non-face to face time preparing to see the patient (eg, review of tests), obtaining and/or reviewing separately obtained history, documenting clinical information in the electronic or other health record, independently interpreting results and communicating results to the patient/family/caregiver, or care coordinator.  Reviewed the possible contributory factors for todays visit.      KALYN Covington         [1]   Social  History  Socioeconomic History    Marital status:    Tobacco Use    Smoking status: Former     Current packs/day: 0.00     Types: Cigarettes     Quit date: 1976     Years since quittin.5    Smokeless tobacco: Never   Vaping Use    Vaping status: Never Used   Substance and Sexual Activity    Alcohol use: Yes     Alcohol/week: 14.0 standard drinks of alcohol     Types: 7 Glasses of wine, 7 Cans of beer per week     Comment: Occasionally    Drug use: Never    Sexual activity: Yes     Partners: Female     Social Drivers of Health     Financial Resource Strain: Low Risk  (2025)    Overall Financial Resource Strain (CARDIA)     Difficulty of Paying Living Expenses: Not hard at all   Food Insecurity: No Food Insecurity (2025)    Hunger Vital Sign     Worried About Running Out of Food in the Last Year: Never true     Ran Out of Food in the Last Year: Never true   Transportation Needs: No Transportation Needs (2025)    PRAPARE - Transportation     Lack of Transportation (Medical): No     Lack of Transportation (Non-Medical): No   Physical Activity: Inactive (2025)    Exercise Vital Sign     Days of Exercise per Week: 0 days     Minutes of Exercise per Session: 20 min   Stress: No Stress Concern Present (2025)    [employer] - Occupational Stress Questionnaire     Feeling of Stress : Only a little   Housing Stability: Low Risk  (2025)    Housing Stability Vital Sign     Unable to Pay for Housing in the Last Year: No     Number of Times Moved in the Last Year: 0     Homeless in the Last Year: No   [2]   Current Outpatient Medications:     amoxicillin (AMOXIL) 500 MG Tab, SMARTSI Tablet(s) By Mouth, Disp: , Rfl:     ascorbic acid, vitamin C, (VITAMIN C) 1000 MG tablet, Take 1,000 mg by mouth 2 (two) times a day., Disp: , Rfl:     aspirin 81 mg Cap, Take 81 mg/kg/day by mouth., Disp: , Rfl:     atorvastatin (LIPITOR) 40 MG tablet, Take 40 mg by mouth.,  Disp: , Rfl:     azelastine (ASTELIN) 137 mcg (0.1 %) nasal spray, SMARTSI Spray(s) Both Nares Twice Daily PRN, Disp: , Rfl:     co-enzyme Q-10 30 mg capsule, Take 3 capsules (90 mg total) by mouth 2 (two) times daily., Disp: , Rfl:     finasteride (PROSCAR) 5 mg tablet, , Disp: , Rfl:     fluticasone propionate (FLONASE) 50 mcg/actuation nasal spray, 1 spray by Nasal route., Disp: , Rfl:     ID NOW COVID-19 TEST KIT Kit, , Disp: , Rfl:     Lactobacillus acidophilus (PROBIOTIC) 10 billion cell Cap, Take 100 tablets by mouth 2 (two) times a day., Disp: , Rfl:     loratadine (CLARITIN) 10 mg tablet, Take 10 mg by mouth once daily., Disp: , Rfl:     losartan (COZAAR) 50 MG tablet, Take 50 mg by mouth once daily., Disp: , Rfl:     omeprazole (PRILOSEC) 40 MG capsule, Take 40 mg by mouth., Disp: , Rfl:     ondansetron (ZOFRAN) 8 MG tablet, Take 1 tablet (8 mg total) by mouth every 12 (twelve) hours as needed for Nausea., Disp: 10 tablet, Rfl: 0    pantoprazole (PROTONIX) 40 MG tablet, Take 1 tablet (40 mg total) by mouth 2 (two) times daily., Disp: 60 tablet, Rfl: 0    sucralfate (CARAFATE) 100 mg/mL suspension, Take 10mLs (1 gram total) by mouth twice daily for 7 days., Disp: 280 mL, Rfl: 0    sulfamethoxazole-trimethoprim 800-160mg (BACTRIM DS) 800-160 mg Tab, Take 1 tablet by mouth 2 (two) times daily. for 7 days, Disp: 14 tablet, Rfl: 0    tamsulosin (FLOMAX) 0.4 mg Cap, 1 capsule., Disp: , Rfl:     furosemide (LASIX) 40 MG tablet, Take 1 tablet (40 mg total) by mouth 2 (two) times daily., Disp: 60 tablet, Rfl: 5    megestroL (MEGACE) 20 MG Tab, 1 tablet Orally Twice a day for 30 days, Disp: , Rfl:     metoprolol succinate (TOPROL-XL) 25 MG 24 hr tablet, Take 1 tablet (25 mg total) by mouth once daily., Disp: 90 tablet, Rfl: 3

## 2025-07-02 NOTE — TELEPHONE ENCOUNTER
Patient was unable to void after brown was removed this morning at 10am. Spouse states that he's only urinated a few drops since this morning. Instructed her to bring pt back to clinic so that a bladder scan could be done. Pt returned back to clinic at 1420. Bladder scan was done which showed 477 ml remaining in bladder. Pt was constipated and a BM in office. 16 fr brown catheter was reinserted by SONAL Dumont. He was instructed to RTC in 2 weeks for another voiding trial. Understanding verbalized.

## 2025-07-03 ENCOUNTER — OFFICE VISIT (OUTPATIENT)
Dept: HEMATOLOGY/ONCOLOGY | Facility: CLINIC | Age: 89
End: 2025-07-03
Payer: MEDICARE

## 2025-07-03 VITALS
TEMPERATURE: 98 F | SYSTOLIC BLOOD PRESSURE: 106 MMHG | WEIGHT: 148.81 LBS | HEART RATE: 60 BPM | BODY MASS INDEX: 21.35 KG/M2 | OXYGEN SATURATION: 96 % | DIASTOLIC BLOOD PRESSURE: 53 MMHG

## 2025-07-03 DIAGNOSIS — C15.9 ESOPHAGEAL ADENOCARCINOMA: Primary | ICD-10-CM

## 2025-07-03 LAB
DHEA SERPL-MCNC: ABNORMAL
ESTRIOL SERPL-MCNC: ABNORMAL NG/ML
ESTROGEN SERPL-MCNC: ABNORMAL PG/ML
INSULIN SERPL-ACNC: ABNORMAL U[IU]/ML
LAB AP CLINICAL INFORMATION: ABNORMAL
LAB AP DIAGNOSIS CATEGORY: ABNORMAL
LAB AP GROSS DESCRIPTION: ABNORMAL
LAB AP PERFORMING LOCATION(S): ABNORMAL
LAB AP REPORT FOOTNOTES: ABNORMAL
LAB AP SYNOPTIC CHECKLIST: ABNORMAL

## 2025-07-03 PROCEDURE — 1160F RVW MEDS BY RX/DR IN RCRD: CPT | Mod: CPTII,S$GLB,, | Performed by: INTERNAL MEDICINE

## 2025-07-03 PROCEDURE — 1126F AMNT PAIN NOTED NONE PRSNT: CPT | Mod: CPTII,S$GLB,, | Performed by: INTERNAL MEDICINE

## 2025-07-03 PROCEDURE — 1159F MED LIST DOCD IN RCRD: CPT | Mod: CPTII,S$GLB,, | Performed by: INTERNAL MEDICINE

## 2025-07-03 PROCEDURE — 99999 PR PBB SHADOW E&M-EST. PATIENT-LVL III: CPT | Mod: PBBFAC,,, | Performed by: INTERNAL MEDICINE

## 2025-07-03 PROCEDURE — G2211 COMPLEX E/M VISIT ADD ON: HCPCS | Mod: S$GLB,,, | Performed by: INTERNAL MEDICINE

## 2025-07-03 PROCEDURE — 99214 OFFICE O/P EST MOD 30 MIN: CPT | Mod: S$GLB,,, | Performed by: INTERNAL MEDICINE

## 2025-07-03 NOTE — PROGRESS NOTES
Problem List: 89 yo man with:  Esophageal adenocarcinoma.   HTN  CASHD  - Hx CABG  Hx ProstheticAortic Valve  PVD  BPH  Early Stage Bladder Cancer   - S/p TURBT    HPI: Mr Garsia returns for follow up of esophageal adenocarcinoma. Since his last visit, he hasd a CT/PET which revealed no evidence of distant disease or usman disease. He had advanced endoscopic evaluation Carlos who performed endoscopic excision via ESD on a 30 mm lesionon . He tolerated treatment well. However, resection revealed positive margins.     PAST MEDICAL HISTORY:He has HTN. He has had CABG and prosthetic aortic valve. He has PVD. He has BPH. He has a hx of localized bladder tumor removed with TURBT and ws not given BCG.   MEDICATIONS:   ALLERGIES:   SOCIAL HISTORY: He was born and raised in [place] and lived here most of his  life . He moved back from [facility]  to be near [facility]. He is marriesd and lives with his wife in [facility]. He drinks beer and wine daily. Stopped smoking in . No drug use or abuse. He is retired as an  and construction.  FAMILY HISTORY:  Mother  in her 70s from colon cancer. Father  early from a poisoning. He has an older brother and older sister both . Brother had MI in his 50s. His sister  in her 30s from self inflicted GSW. He has 2 daugheter and 3 sons  generally healthy    REVIEW OF SYSTEMS:  See HPI    Physical Exam:  VS: 1106/53      60        18       98.1       67.5   (147)  Gen: Awake and Alert, No Apparent Distress  HEENT: NCAT, PERRLA, EOMI, , Moist oral, nasal and ocular mucus membranes, OP Clear  Neck: Supple, No JVD or Adenopathy. Trachea is Midline, No Thyromegaly, No thyroid masses  Heart: Regular Rhythm and rate, II/VI systolic murmur  Lungs: Symmetric chest excursions bilaterally. No use of accessory respiratory muscles. Lungs are clear to auscultation bilaterally  Abdomen: Soft, Nontender/Nondistended. There are normoactive  bowel sounds. There is no hepatosplenomegaly  Extremities:  There is no peripheral edema. No joint deformities, joint effusions or joint tenderness in the ankles, knees, hips, shoulders, elbows, wristst and digits. There is no cyanosis. 2+ radia. Nonpalpable pedal pulses.  Mild interossei wasting  Back: Straight, No paraspinal tenderness or CVA tenderness  Skin: Intact. No sites of breakdown. No subcutaneous nodules. No rashes. No petechiae. Xavier's purpura  Lymphatics: No cervical, supraclavicular, axillary or inguinal adenopathy  Neurologic: Awake, alert and fully oriented. Cranial nerves II-XII intact.     Laboratory Data:   Results for orders placed or performed in visit on 07/02/25   POCT Bladder Scan    Collection Time: 07/02/25  3:55 PM   Result Value Ref Range    POC Residual Urine Volume 277 (A) 0 - 100 mL     Early Stage Esophageal Adenocarcinoma (Stage I; [id number])   - Concern about posutve margins on ESD   - Consider Capecitabine plus XRT for management of margin positive disease versus endoscopic surveillance   - Case to be discussed

## 2025-07-07 ENCOUNTER — TUMOR BOARD CONFERENCE (OUTPATIENT)
Dept: SURGERY | Facility: CLINIC | Age: 89
End: 2025-07-07
Payer: MEDICARE

## 2025-07-07 NOTE — PROGRESS NOTES
[facility] UGI MULTIDISCIPLINARY TUMOR BOARD  PATIENT REVIEW FORM   ____________________________________________________________    CLINIC #: [id number]  DATE: 7/21/2025    DIAGNOSIS: mid esophageal VANESSA     PRESENTER: Bethany     PATIENT SUMMARY:   This 89 y/o gentleman presented with weight loss. Denies any dysphagia. He underwent outside EGD in April 2025 per Dr. Macdonald, who found nodularity in mid esophagus. This was biopsied, and pathology revealed moderately differentiated VANESSA. He then had outside EUS in 5/2025 per Dr. Cheung, staged uT1bN1. Periesophageal lymph node was biopsied and pathology was benign. Reviewed staging PET- no evidence of primary or distant metastatic disease.  His case was presented to this I [facility] last month with recommendation to proceed with ESD.   Since then, he underwent ESD/ ESR of mid esophageal mass per Dr. Gonzalez on 6/25/2025. Pathology revealed G2 moderately differentiated, no PNI, + margin.    BOARD RECOMMENDATIONS:   pT1a based on ESD path  Recommend surveillance only with repeat scope in 3 months    CONSULT NEEDED:     [] Surgery    [] Hem/Onc    [] Rad/Onc    [] Dietary                 [] Genetics    [] Psychology       [x] AES  [] Interventional Radiology     EUS Clinical Stage: Tumor 1b Node(s) 1 Metastasis 0    ESD/ EMR Pathologic Stage: Tumor 1a Node(s) 0 Metastasis 0      GROUP STAGE:  [] O    [] 1A    [] IB    [] IIA    [] IIB     [] IIIA     [] IIIB     [] IIIC    []IV  [] Local recurrence     [] Regional recurrence     [] Distant recurrence   Metastatic site(s): none         [x] [facility] Treatment Guidelines reviewed and care planned is consistent with guidelines.         (i.e., NCCN, NCI, PD, ACO, AUA, etc.)    PRESENTATION AT CANCER CONFERENCE:         [x] Prospective    [] Retrospective     [] Follow-Up

## 2025-07-08 ENCOUNTER — PATIENT MESSAGE (OUTPATIENT)
Dept: HEMATOLOGY/ONCOLOGY | Facility: CLINIC | Age: 89
End: 2025-07-08
Payer: MEDICARE

## 2025-07-10 ENCOUNTER — LAB VISIT (OUTPATIENT)
Dept: LAB | Facility: HOSPITAL | Age: 89
End: 2025-07-10
Attending: FAMILY MEDICINE
Payer: MEDICARE

## 2025-07-10 DIAGNOSIS — I50.20 HEART FAILURE WITH REDUCED EJECTION FRACTION: ICD-10-CM

## 2025-07-10 LAB
ANION GAP (OHS): 8 MMOL/L (ref 8–16)
BUN SERPL-MCNC: 38 MG/DL (ref 8–23)
CALCIUM SERPL-MCNC: 8.9 MG/DL (ref 8.7–10.5)
CHLORIDE SERPL-SCNC: 100 MMOL/L (ref 95–110)
CO2 SERPL-SCNC: 24 MMOL/L (ref 23–29)
CREAT SERPL-MCNC: 1.3 MG/DL (ref 0.5–1.4)
GFR SERPLBLD CREATININE-BSD FMLA CKD-EPI: 53 ML/MIN/1.73/M2
GLUCOSE SERPL-MCNC: 72 MG/DL (ref 70–110)
POTASSIUM SERPL-SCNC: 4.4 MMOL/L (ref 3.5–5.1)
SODIUM SERPL-SCNC: 132 MMOL/L (ref 136–145)

## 2025-07-10 PROCEDURE — 36415 COLL VENOUS BLD VENIPUNCTURE: CPT | Mod: PO

## 2025-07-10 PROCEDURE — 80048 BASIC METABOLIC PNL TOTAL CA: CPT

## 2025-07-16 ENCOUNTER — OFFICE VISIT (OUTPATIENT)
Dept: UROLOGY | Facility: CLINIC | Age: 89
End: 2025-07-16
Payer: MEDICARE

## 2025-07-16 VITALS
BODY MASS INDEX: 21.3 KG/M2 | SYSTOLIC BLOOD PRESSURE: 135 MMHG | DIASTOLIC BLOOD PRESSURE: 66 MMHG | HEIGHT: 70 IN | HEART RATE: 55 BPM | WEIGHT: 148.81 LBS

## 2025-07-16 DIAGNOSIS — C15.9 ESOPHAGEAL ADENOCARCINOMA: ICD-10-CM

## 2025-07-16 DIAGNOSIS — N52.9 MALE ERECTILE DISORDER: ICD-10-CM

## 2025-07-16 DIAGNOSIS — Z87.438 HISTORY OF BENIGN PROSTATIC HYPERPLASIA: Primary | ICD-10-CM

## 2025-07-16 LAB — POC RESIDUAL URINE VOLUME: 10 ML (ref 0–100)

## 2025-07-16 PROCEDURE — 99999 PR PBB SHADOW E&M-EST. PATIENT-LVL III: CPT | Mod: PBBFAC,,,

## 2025-07-16 NOTE — PROGRESS NOTES
CHIEF COMPLAINT:  Voiding trial       HISTORY OF PRESENTING ILLINESS:  Chang Purcell is a 89 y.o. male is here today for a voiding trial after failing a VT on 7/2. His brown has been in place since 7/2. He has a history of esophageal carcinoma, BPH, ED, hematuria, and decreased hearing.  He is here today with his spouse. His urine has remained clear and yellow without any s/s of infection.   He continues to take Flomax and Finasteride daily for BPH management.      REVIEW OF SYSTEMS:  Review of Systems   Constitutional:  Negative for chills and fever.   Genitourinary:         See HPI   Musculoskeletal:  Positive for myalgias.   Neurological:  Negative for dizziness and headaches.         PATIENT HISTORY:    Past Medical History:   Diagnosis Date    Coronary artery disease     Heart murmur     Hyperlipidemia     Hypertension     Joint pain ~2 months    Trigger finger    Mechanical heart valve present     Aortic valve    Skin disease     Axillary and left thigh pruritic rash    Valvular regurgitation        Past Surgical History:   Procedure Laterality Date    APPENDECTOMY  1950    CARDIAC VALVE REPLACEMENT  2007    Bioprosthetic Aortic  valve    CARDIAC VALVE SURGERY  2007    CATARACT EXTRACTION      CATARACT EXTRACTION W/  INTRAOCULAR LENS IMPLANT Right 03/06/2023    Procedure: EXTRACTION, CATARACT, WITH IOL INSERTION;  Surgeon: Brian Davis MD;  Location: [facility] OR;  Service: Ophthalmology;  Laterality: Right;    CATARACT EXTRACTION W/  INTRAOCULAR LENS IMPLANT Left 03/20/2023    Procedure: EXTRACTION, CATARACT, WITH IOL INSERTION;  Surgeon: Brian Davis MD;  Location: [facility] OR;  Service: Ophthalmology;  Laterality: Left;    CORONARY ARTERY BYPASS GRAFT  2007    x 2    DISSECTION, LESION, ESOPHAGUS, STOMACH, OR DUODENUM, SUBMUCOSAL, ENDOSCOPIC N/A 6/25/2025    Procedure: DISSECTION, LESION, ESOPHAGUS, STOMACH, OR DUODENUM, SUBMUCOSAL, ENDOSCOPIC;  Surgeon: Bright Gonzalez MD;  Location: [facility] ENDO ([facility]  FLR);  Service: Endoscopy;  Laterality: N/A;  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 Cardiac clearance approved with Dr. Tobin. Pt is not currently taking jardiance SL  6/23- precall complete.     ENDOSCOPIC ULTRASOUND OF UPPER GASTROINTESTINAL TRACT N/A 6/25/2025    Procedure: ULTRASOUND, UPPER GI TRACT, ENDOSCOPIC;  Surgeon: Bright Gonzalez MD;  Location: [facility] (2ND FLR);  Service: Endoscopy;  Laterality: N/A;       Family History   Problem Relation Name Age of Onset    Colon cancer Mother Anu     Depression Brother Enrrique         Years ago    Heart attack Neg Hx      Heart disease Neg Hx      Hyperlipidemia Neg Hx      Hypertension Neg Hx      Melanoma Neg Hx         Social History[1]    Allergies:  Patient has no known allergies.    Medications:  Current Medications[2]    PHYSICAL EXAMINATION:  Physical Exam  Vitals reviewed.   Constitutional:       General: He is awake.      Appearance: Normal appearance.   HENT:      Head: Normocephalic and atraumatic.   Pulmonary:      Effort: Pulmonary effort is normal. No respiratory distress.   Abdominal:      General: Abdomen is flat. There is no distension.   Genitourinary:     Penis: Normal and uncircumcised.       Testes: Normal.   Skin:     General: Skin is warm and dry.      Capillary Refill: Capillary refill takes less than 2 seconds.   Neurological:      General: No focal deficit present.      Mental Status: He is alert.   Psychiatric:         Mood and Affect: Mood normal.         Behavior: Behavior normal. Behavior is cooperative.         Thought Content: Thought content normal.           LABS:    A bladder scan was done after catheter removal which showed 10 ml.       Lab Results   Component Value Date    PSA 2.0 06/15/2016       Lab Results   Component Value Date    CREATININE 1.3 07/10/2025    EGFRNORACEVR 53 (L) 07/10/2025               IMPRESSION:    Encounter Diagnoses   Name Primary?     History of benign prostatic hypertrophy Yes    Male erectile disorder     Esophageal adenocarcinoma            Assessment:       1. History of benign prostatic hypertrophy    2. Male erectile disorder    3. Esophageal adenocarcinoma          Plan:       - Name, , allergies verified. Procedure explained, questions answered. Nurse Tim instilled 150 ml in the bladder until the patient verbalized discomfort.  Brown catheter balloon was then deflated and catheter was removed without issue. Patient was able to void 200 cc after. Instructed to drink 6-8 oz water/hr until urology clinic calls the patient at 1300 to check on urination status. Patient verbalized understanding. If unable to urinate, pt will need to RTC by 1500 so that PVR can be obtained with possible brown catheter placement. Patient and spouse verbalized understanding.     I spent a total of 30 minutes on the day of the visit. This includes face to face time and non-face to face time preparing to see the patient (eg, review of tests), obtaining and/or reviewing separately obtained history, documenting clinical information in the electronic or other health record, independently interpreting results and communicating results to the patient/family/caregiver, or care coordinator.  Reviewed the possible contributory factors for todays visit.      KALYN Covington           [1]   Social History  Socioeconomic History    Marital status:    Tobacco Use    Smoking status: Former     Current packs/day: 0.00     Types: Cigarettes     Quit date: 1976     Years since quittin.5    Smokeless tobacco: Never   Vaping Use    Vaping status: Never Used   Substance and Sexual Activity    Alcohol use: Yes     Alcohol/week: 14.0 standard drinks of alcohol     Types: 7 Glasses of wine, 7 Cans of beer per week     Comment: Occasionally    Drug use: Never    Sexual activity: Yes     Partners: Female     Social Drivers of Health     Financial Resource Strain: Low  Risk  (2025)    Overall Financial Resource Strain (CARDIA)     Difficulty of Paying Living Expenses: Not hard at all   Food Insecurity: No Food Insecurity (2025)    Hunger Vital Sign     Worried About Running Out of Food in the Last Year: Never true     Ran Out of Food in the Last Year: Never true   Transportation Needs: No Transportation Needs (2025)    PRAPARE - Transportation     Lack of Transportation (Medical): No     Lack of Transportation (Non-Medical): No   Physical Activity: Inactive (2025)    Exercise Vital Sign     Days of Exercise per Week: 0 days     Minutes of Exercise per Session: 20 min   Stress: No Stress Concern Present (2025)    [employer] [place] [employer] - Occupational Stress Questionnaire     Feeling of Stress : Only a little   Housing Stability: Low Risk  (2025)    Housing Stability Vital Sign     Unable to Pay for Housing in the Last Year: No     Number of Times Moved in the Last Year: 0     Homeless in the Last Year: No   [2]   Current Outpatient Medications:     amoxicillin (AMOXIL) 500 MG Tab, SMARTSI Tablet(s) By Mouth, Disp: , Rfl:     ascorbic acid, vitamin C, (VITAMIN C) 1000 MG tablet, Take 1,000 mg by mouth 2 (two) times a day., Disp: , Rfl:     aspirin 81 mg Cap, Take 81 mg/kg/day by mouth., Disp: , Rfl:     atorvastatin (LIPITOR) 40 MG tablet, Take 40 mg by mouth., Disp: , Rfl:     azelastine (ASTELIN) 137 mcg (0.1 %) nasal spray, SMARTSI Spray(s) Both Nares Twice Daily PRN, Disp: , Rfl:     co-enzyme Q-10 30 mg capsule, Take 3 capsules (90 mg total) by mouth 2 (two) times daily., Disp: , Rfl:     finasteride (PROSCAR) 5 mg tablet, , Disp: , Rfl:     fluticasone propionate (FLONASE) 50 mcg/actuation nasal spray, 1 spray by Nasal route., Disp: , Rfl:     furosemide (LASIX) 40 MG tablet, Take 1 tablet (40 mg total) by mouth 2 (two) times daily., Disp: 60 tablet, Rfl: 5    ID NOW COVID-19 TEST KIT Kit, , Disp: , Rfl:     Lactobacillus  acidophilus (PROBIOTIC) 10 billion cell Cap, Take 100 tablets by mouth 2 (two) times a day., Disp: , Rfl:     loratadine (CLARITIN) 10 mg tablet, Take 10 mg by mouth once daily., Disp: , Rfl:     losartan (COZAAR) 50 MG tablet, Take 50 mg by mouth once daily., Disp: , Rfl:     megestroL (MEGACE) 20 MG Tab, 1 tablet Orally Twice a day for 30 days, Disp: , Rfl:     metoprolol succinate (TOPROL-XL) 25 MG 24 hr tablet, Take 1 tablet (25 mg total) by mouth once daily., Disp: 90 tablet, Rfl: 3    omeprazole (PRILOSEC) 40 MG capsule, Take 40 mg by mouth., Disp: , Rfl:     ondansetron (ZOFRAN) 8 MG tablet, Take 1 tablet (8 mg total) by mouth every 12 (twelve) hours as needed for Nausea., Disp: 10 tablet, Rfl: 0    pantoprazole (PROTONIX) 40 MG tablet, Take 1 tablet (40 mg total) by mouth 2 (two) times daily., Disp: 60 tablet, Rfl: 0    sucralfate (CARAFATE) 100 mg/mL suspension, Take 10mLs (1 gram total) by mouth twice daily for 7 days., Disp: 280 mL, Rfl: 0    tamsulosin (FLOMAX) 0.4 mg Cap, 1 capsule., Disp: , Rfl:

## 2025-08-26 ENCOUNTER — PATIENT MESSAGE (OUTPATIENT)
Dept: GASTROENTEROLOGY | Facility: CLINIC | Age: 89
End: 2025-08-26
Payer: MEDICARE

## 2025-08-27 ENCOUNTER — OFFICE VISIT (OUTPATIENT)
Dept: CARDIOLOGY | Facility: CLINIC | Age: 89
End: 2025-08-27
Payer: MEDICARE

## 2025-08-27 ENCOUNTER — TELEPHONE (OUTPATIENT)
Dept: GASTROENTEROLOGY | Facility: CLINIC | Age: 89
End: 2025-08-27
Payer: MEDICARE

## 2025-08-27 VITALS
OXYGEN SATURATION: 96 % | DIASTOLIC BLOOD PRESSURE: 53 MMHG | BODY MASS INDEX: 23.14 KG/M2 | WEIGHT: 161.63 LBS | HEART RATE: 49 BPM | HEIGHT: 70 IN | SYSTOLIC BLOOD PRESSURE: 124 MMHG

## 2025-08-27 DIAGNOSIS — Z95.2 S/P AORTIC VALVE REPLACEMENT: ICD-10-CM

## 2025-08-27 DIAGNOSIS — I50.20 HEART FAILURE WITH REDUCED EJECTION FRACTION: ICD-10-CM

## 2025-08-27 DIAGNOSIS — C15.9 ESOPHAGEAL ADENOCARCINOMA: Primary | ICD-10-CM

## 2025-08-27 DIAGNOSIS — E78.2 MIXED HYPERLIPIDEMIA: ICD-10-CM

## 2025-08-27 DIAGNOSIS — I25.810 CORONARY ARTERY DISEASE INVOLVING CORONARY BYPASS GRAFT OF NATIVE HEART WITHOUT ANGINA PECTORIS: Primary | ICD-10-CM

## 2025-08-27 DIAGNOSIS — I10 ESSENTIAL HYPERTENSION: ICD-10-CM

## 2025-08-27 DIAGNOSIS — C15.9 ESOPHAGEAL ADENOCARCINOMA: ICD-10-CM

## 2025-08-27 PROCEDURE — 1160F RVW MEDS BY RX/DR IN RCRD: CPT | Mod: CPTII,S$GLB,, | Performed by: INTERNAL MEDICINE

## 2025-08-27 PROCEDURE — 3288F FALL RISK ASSESSMENT DOCD: CPT | Mod: CPTII,S$GLB,, | Performed by: INTERNAL MEDICINE

## 2025-08-27 PROCEDURE — 1126F AMNT PAIN NOTED NONE PRSNT: CPT | Mod: CPTII,S$GLB,, | Performed by: INTERNAL MEDICINE

## 2025-08-27 PROCEDURE — 99214 OFFICE O/P EST MOD 30 MIN: CPT | Mod: S$GLB,,, | Performed by: INTERNAL MEDICINE

## 2025-08-27 PROCEDURE — 99999 PR PBB SHADOW E&M-EST. PATIENT-LVL IV: CPT | Mod: PBBFAC,,, | Performed by: INTERNAL MEDICINE

## 2025-08-27 PROCEDURE — 1101F PT FALLS ASSESS-DOCD LE1/YR: CPT | Mod: CPTII,S$GLB,, | Performed by: INTERNAL MEDICINE

## 2025-08-27 PROCEDURE — 1159F MED LIST DOCD IN RCRD: CPT | Mod: CPTII,S$GLB,, | Performed by: INTERNAL MEDICINE

## (undated) DEVICE — Device

## (undated) DEVICE — SYR SLIP TIP 1CC

## (undated) DEVICE — SOL BETADINE 5%

## (undated) DEVICE — GLOVE BIOGEL SKINSENSE PI 7.5

## (undated) DEVICE — GLASSES EYE PROTECTIVE

## (undated) DEVICE — SOL POVIDONE SCRUB IODINE 4 OZ